# Patient Record
Sex: FEMALE | Race: WHITE | NOT HISPANIC OR LATINO | URBAN - METROPOLITAN AREA
[De-identification: names, ages, dates, MRNs, and addresses within clinical notes are randomized per-mention and may not be internally consistent; named-entity substitution may affect disease eponyms.]

---

## 2017-09-26 ENCOUNTER — OUTPATIENT (OUTPATIENT)
Dept: OUTPATIENT SERVICES | Facility: HOSPITAL | Age: 72
LOS: 1 days | Discharge: HOME | End: 2017-09-26

## 2017-09-26 DIAGNOSIS — H25.12 AGE-RELATED NUCLEAR CATARACT, LEFT EYE: ICD-10-CM

## 2017-10-04 DIAGNOSIS — H26.8 OTHER SPECIFIED CATARACT: ICD-10-CM

## 2019-06-11 ENCOUNTER — OFFICE VISIT (OUTPATIENT)
Dept: OBGYN CLINIC | Facility: CLINIC | Age: 74
End: 2019-06-11
Payer: COMMERCIAL

## 2019-06-11 ENCOUNTER — APPOINTMENT (OUTPATIENT)
Dept: RADIOLOGY | Facility: CLINIC | Age: 74
End: 2019-06-11
Payer: COMMERCIAL

## 2019-06-11 VITALS
HEIGHT: 66 IN | BODY MASS INDEX: 25.07 KG/M2 | DIASTOLIC BLOOD PRESSURE: 82 MMHG | WEIGHT: 156 LBS | SYSTOLIC BLOOD PRESSURE: 119 MMHG | HEART RATE: 62 BPM

## 2019-06-11 DIAGNOSIS — Z98.890 S/P ORIF (OPEN REDUCTION INTERNAL FIXATION) FRACTURE: ICD-10-CM

## 2019-06-11 DIAGNOSIS — M24.811 INTERNAL DERANGEMENT OF SHOULDER, RIGHT: ICD-10-CM

## 2019-06-11 DIAGNOSIS — M79.601 PAIN OF RIGHT UPPER EXTREMITY: ICD-10-CM

## 2019-06-11 DIAGNOSIS — M79.601 PAIN OF RIGHT UPPER EXTREMITY: Primary | ICD-10-CM

## 2019-06-11 DIAGNOSIS — M75.81 ROTATOR CUFF TENDONITIS, RIGHT: ICD-10-CM

## 2019-06-11 DIAGNOSIS — Z87.81 S/P ORIF (OPEN REDUCTION INTERNAL FIXATION) FRACTURE: ICD-10-CM

## 2019-06-11 PROCEDURE — 73030 X-RAY EXAM OF SHOULDER: CPT

## 2019-06-11 PROCEDURE — 99203 OFFICE O/P NEW LOW 30 MIN: CPT | Performed by: ORTHOPAEDIC SURGERY

## 2019-06-11 RX ORDER — DIPHENOXYLATE HYDROCHLORIDE AND ATROPINE SULFATE 2.5; .025 MG/1; MG/1
1 TABLET ORAL DAILY
COMMUNITY

## 2019-06-11 RX ORDER — MIRTAZAPINE 15 MG/1
TABLET, FILM COATED ORAL
COMMUNITY
Start: 2019-05-23 | End: 2022-07-07 | Stop reason: SDUPTHER

## 2019-06-11 RX ORDER — LEVOTHYROXINE SODIUM 0.07 MG/1
TABLET ORAL
COMMUNITY
Start: 2019-06-06 | End: 2022-07-07 | Stop reason: SDUPTHER

## 2019-06-11 RX ORDER — MULTIVIT WITH MINERALS/LUTEIN
1500 TABLET ORAL 2 TIMES DAILY
COMMUNITY

## 2019-06-11 RX ORDER — MAGNESIUM 30 MG
30 TABLET ORAL 2 TIMES DAILY
COMMUNITY
End: 2019-08-21

## 2019-06-11 RX ORDER — UREA 10 %
1 LOTION (ML) TOPICAL DAILY
COMMUNITY
End: 2019-08-21

## 2019-06-12 ENCOUNTER — APPOINTMENT (OUTPATIENT)
Dept: RADIOLOGY | Facility: CLINIC | Age: 74
End: 2019-06-12
Payer: COMMERCIAL

## 2019-06-12 ENCOUNTER — OFFICE VISIT (OUTPATIENT)
Dept: OBGYN CLINIC | Facility: CLINIC | Age: 74
End: 2019-06-12
Payer: COMMERCIAL

## 2019-06-12 VITALS
SYSTOLIC BLOOD PRESSURE: 119 MMHG | DIASTOLIC BLOOD PRESSURE: 82 MMHG | HEIGHT: 66 IN | WEIGHT: 156 LBS | BODY MASS INDEX: 25.07 KG/M2 | HEART RATE: 55 BPM

## 2019-06-12 DIAGNOSIS — M25.562 LEFT KNEE PAIN, UNSPECIFIED CHRONICITY: ICD-10-CM

## 2019-06-12 DIAGNOSIS — M17.12 PRIMARY OSTEOARTHRITIS OF LEFT KNEE: Primary | ICD-10-CM

## 2019-06-12 DIAGNOSIS — M25.511 RIGHT SHOULDER PAIN, UNSPECIFIED CHRONICITY: ICD-10-CM

## 2019-06-12 DIAGNOSIS — M54.12 CERVICAL RADICULITIS: ICD-10-CM

## 2019-06-12 PROCEDURE — 73562 X-RAY EXAM OF KNEE 3: CPT

## 2019-06-12 PROCEDURE — 99213 OFFICE O/P EST LOW 20 MIN: CPT | Performed by: ORTHOPAEDIC SURGERY

## 2019-07-03 ENCOUNTER — EVALUATION (OUTPATIENT)
Dept: PHYSICAL THERAPY | Facility: CLINIC | Age: 74
End: 2019-07-03
Payer: COMMERCIAL

## 2019-07-03 DIAGNOSIS — G89.29 CHRONIC RIGHT SHOULDER PAIN: Primary | ICD-10-CM

## 2019-07-03 DIAGNOSIS — M25.562 CHRONIC PAIN OF LEFT KNEE: ICD-10-CM

## 2019-07-03 DIAGNOSIS — M25.511 CHRONIC RIGHT SHOULDER PAIN: Primary | ICD-10-CM

## 2019-07-03 DIAGNOSIS — G89.29 CHRONIC PAIN OF LEFT KNEE: ICD-10-CM

## 2019-07-03 PROCEDURE — 97162 PT EVAL MOD COMPLEX 30 MIN: CPT | Performed by: PHYSICAL THERAPIST

## 2019-07-03 PROCEDURE — 97110 THERAPEUTIC EXERCISES: CPT | Performed by: PHYSICAL THERAPIST

## 2019-07-03 NOTE — PROGRESS NOTES
PT Evaluation     Today's date: 7/3/2019  Patient name: Yissel Viera  : 1945  MRN: [id number]  Referring provider: Devin Leija MD  Dx:   Encounter Diagnosis     ICD-10-CM    1  Chronic right shoulder pain M25 511     G89 29    2  Chronic pain of left knee M25 562     G89 29                   Assessment  Assessment details: Yissel Viera is a 69year old female presenting to physical therapy with chief complaints of left knee pain and R shoulder pain  Patient demonstrates decreased strength and stability throughout the rotator cuff and long with hypomobility and decreased motor control of the left lower extremity  Extensive education regarding diganosis and expectations were provided to the patient with all questions answered to the satisfaction of the patient  Patient was provided a home exercise program and demonstrated an understanding of exercises  Patient was advised to stop performing home exercise program if symptoms increase or new complaints developed  Verbal understanding demonstrated regarding home exercise program instructions  Patient would benefit from skilled physical therapy services for prescribed exercises, manual interventions, neuromuscular re-education, education, and modalities as deemed appropriate to assist patient in achieving their maximum level of function  Impairments: abnormal gait, abnormal muscle tone, abnormal or restricted ROM, impaired physical strength, lacks appropriate home exercise program and pain with function  Functional limitations: reaching, carrying, squatting, kneeling, getting up from the floor, swimmingUnderstanding of Dx/Px/POC: fair   Prognosis: good    Goals  STG  1  In 4-6 weeks, patient will demonstrate a decrease in pain to 3/10 during functional activities  2  In 4-6 weeks, patient will demonstrate an increase in range of motion by 10 degrees  LTG   1  In 8 weeks, patient will be independent with their home exercise program  2    In 8 weeks, patient will demonstrate improvements in strength as evidenced by being able to lift 5 pounds overhead without pain  3  In 8 weeks, patient will demonstrate improvements in activity tolerance as evidenced by being able to resume swimming activities without limitation  4  In 8 weeks, patient will be able to complete recreational walking without functional limitation secondary to L knee pain      Plan  Patient would benefit from: skilled physical therapy  Planned therapy interventions: balance, flexibility, functional ROM exercises, graded exercise, home exercise program, therapeutic exercise, strengthening, patient education, neuromuscular re-education and manual therapy  Frequency: 2x week  Duration in weeks: 4        Subjective Evaluation    History of Present Illness  Mechanism of injury: R shoulder - Patient reports that she had a humeral ORIF in 2012 on the R side  After her surgery, she reports that she did have pain down into the R wrist and elbow but this did resolve  She states that about 3-4 months ago she started to experience pain again in the shoulder  She states about 2 years ago though she started to feel some discomfort in her elbow and wrist that was similar to what she felt after her original surgery but this did dissipate  Regarding her current complaints she feels that her pain is muscular in nature and states that she did not want to move her arm at all because of pain  She states around the same time she felt pain she was doing some outside work but is unsure if this is related  She states that she has stopped using her arm as much as possible at this time  She does note some improvements in her pain symptoms since resting the upper extremity  She denies having current complaints distal to the elbow  Denies numbness, tingling, burning in the upper extremity  She is RHD  She is able to sleep at night        L knee - Patient reports that she has a long standing history of knee pain, she feels that this is 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  She reports that her left knee locks if she has been sitting for a long period of time and tries to move her knee  She denies having her knee give out on her while she is ambulating  She reports that if she presses on her knee joint while moving she does decrease her pain  She does report having crepitus in the left knee  Pain  Current pain ratin  At best pain ratin  Pain scale at highest: R shoulder 4/10, L knee 4/10  Patient Goals  Patient goals for therapy: increased motion, increased strength, independence with ADLs/IADLs and decreased pain          Objective     Palpation     Additional Palpation Details  Denies  Denies tenderness to palpation     Active Range of Motion     Right Shoulder   Flexion: 140 degrees   Abduction: 110 degrees with pain  External rotation BTH: C6 with pain  Internal rotation BTB: L3   Left Knee   Normal active range of motion    Right Knee   Normal active range of motion    Passive Range of Motion     Right Shoulder   Flexion: 150 degrees   Abduction: 121 degrees with pain  External rotation 90°: 30 degrees     Mobility   Patellar Mobility:   Left Knee   Hypomobile: left medial  Tibiofemoral Mobility:   Left knee Hypomobile in the posterior tibiofemoral tendon(s)  Strength/Myotome Testing     Left Shoulder   Normal muscle strength    Right Shoulder     Planes of Motion   Flexion: 4   Abduction: 4-   External rotation at 0°: 4-   Internal rotation at 0°: 4-     Left Knee   Flexion: 4  Extension: 4    Tests     Right Shoulder   Positive belly press, Hawkin's and painful arc  Negative external rotation lag sign, ULTT1 and bicep load test positive  Left Knee   Positive patella-femoral grind  Negative anterior Lachman, lateral Pancho, medial Pancho, valgus stress test at 0 degrees and varus stress test at 0 degrees                Precautions: R shoulder ORIF       Manual  7/3            R shoulder prom Exercise Diary  7/3            SL ER 20x            SL ABD 20x            SLR 20x            Tb ir/er             Prone row             Prone ext             SLR ABD             Heel slides             HSS             Quad stretch                                                                                                                                                   Modalities

## 2019-07-10 ENCOUNTER — OFFICE VISIT (OUTPATIENT)
Dept: PHYSICAL THERAPY | Facility: CLINIC | Age: 74
End: 2019-07-10
Payer: COMMERCIAL

## 2019-07-10 DIAGNOSIS — G89.29 CHRONIC RIGHT SHOULDER PAIN: Primary | ICD-10-CM

## 2019-07-10 DIAGNOSIS — M25.511 CHRONIC RIGHT SHOULDER PAIN: Primary | ICD-10-CM

## 2019-07-10 DIAGNOSIS — M25.562 CHRONIC PAIN OF LEFT KNEE: ICD-10-CM

## 2019-07-10 DIAGNOSIS — G89.29 CHRONIC PAIN OF LEFT KNEE: ICD-10-CM

## 2019-07-10 PROCEDURE — 97112 NEUROMUSCULAR REEDUCATION: CPT | Performed by: PHYSICAL THERAPIST

## 2019-07-10 PROCEDURE — 97140 MANUAL THERAPY 1/> REGIONS: CPT | Performed by: PHYSICAL THERAPIST

## 2019-07-10 PROCEDURE — 97110 THERAPEUTIC EXERCISES: CPT | Performed by: PHYSICAL THERAPIST

## 2019-07-10 NOTE — PROGRESS NOTES
Daily Note     Today's date: 7/10/2019  Patient name: Alana Worrell  : 1945  MRN: [id number]  Referring provider: Neoma Sicard, MD  Dx:   Encounter Diagnosis     ICD-10-CM    1  Chronic right shoulder pain M25 511     G89 29    2  Chronic pain of left knee M25 562     G89 29                   Subjective: Patient reports today that she has noticed that she has the most amount of difficulty with external rotation based motions  Objective: See treatment diary below      Assessment: High levels of patient education provided today regarding referred pain  Difficulty remains with completing external rotation based motions in the shoulder secondary to pain  As far as the knee is concerned, weakness in the hip abductors are present which does create a valgus moment in the knee      Plan: Continue per plan of care        Precautions: R shoulder ORIF       Manual  7/3 7/10           R shoulder prom  10 mins                                                                   Exercise Diary  7/3 7/10           SL ER 20x 20x           SL ABD 20x 20x           SLR 20x 20x           Tb ir/er  nv           Prone abd  20x           Prone ext  20x           SLR ABD  20x           Heel slides  20x           HSS  10"x10           Quad stretch  10"x10           pulleys  10"x20           Finger ladder  10"x5           clamshells  20x           Standing hip abduction  gtb 20x                                                                                             Modalities

## 2019-07-17 ENCOUNTER — APPOINTMENT (OUTPATIENT)
Dept: PHYSICAL THERAPY | Facility: CLINIC | Age: 74
End: 2019-07-17
Payer: COMMERCIAL

## 2019-07-18 ENCOUNTER — OFFICE VISIT (OUTPATIENT)
Dept: PHYSICAL THERAPY | Facility: CLINIC | Age: 74
End: 2019-07-18
Payer: COMMERCIAL

## 2019-07-18 DIAGNOSIS — M25.562 CHRONIC PAIN OF LEFT KNEE: ICD-10-CM

## 2019-07-18 DIAGNOSIS — M25.511 CHRONIC RIGHT SHOULDER PAIN: Primary | ICD-10-CM

## 2019-07-18 DIAGNOSIS — G89.29 CHRONIC RIGHT SHOULDER PAIN: Primary | ICD-10-CM

## 2019-07-18 DIAGNOSIS — G89.29 CHRONIC PAIN OF LEFT KNEE: ICD-10-CM

## 2019-07-18 PROCEDURE — 97140 MANUAL THERAPY 1/> REGIONS: CPT

## 2019-07-18 PROCEDURE — 97110 THERAPEUTIC EXERCISES: CPT

## 2019-07-18 NOTE — PROGRESS NOTES
Daily Note     Today's date: 2019  Patient name: Charo Moss  : 1945  MRN: [id number]  Referring provider: Erna Sánchez MD  Dx:   Encounter Diagnosis     ICD-10-CM    1  Chronic right shoulder pain M25 511     G89 29    2  Chronic pain of left knee M25 562     G89 29                   Subjective: Patient noted pain in R shoulder and L knee  Objective: See treatment diary below      Assessment: Tolerated treatment fair  Patient was able to perform exercises with correct form and tolerance  PROM to tolerance  Patient would benefit from continued PT      Plan: Continue per plan of care        Precautions: R shoulder ORIF       Manual  7/3 7/10 7/18          R shoulder prom  10 mins 10 mins                                                                  Exercise Diary  7/3 7/10 7/18          SL ER 20x 20x 20x          SL ABD 20x 20x x20          SLR 20x 20x x20          Tb ir/er  nv           Prone abd  20x 20 x           Prone ext  20x 20 x           SLR ABD  20x x20          Heel slides  20x 20x          HSS  10"x10 10"x10          Quad stretch  10"x10 10"x10          pulleys  10"x20 10"x20          Finger ladder  10"x5 10"x5          clamshells  20x 20x          Standing hip abduction  gtb 20x gtb 20x                                                                                            Modalities

## 2019-07-19 ENCOUNTER — APPOINTMENT (OUTPATIENT)
Dept: PHYSICAL THERAPY | Facility: CLINIC | Age: 74
End: 2019-07-19
Payer: COMMERCIAL

## 2019-07-22 ENCOUNTER — OFFICE VISIT (OUTPATIENT)
Dept: PHYSICAL THERAPY | Facility: CLINIC | Age: 74
End: 2019-07-22
Payer: COMMERCIAL

## 2019-07-22 DIAGNOSIS — G89.29 CHRONIC RIGHT SHOULDER PAIN: Primary | ICD-10-CM

## 2019-07-22 DIAGNOSIS — G89.29 CHRONIC PAIN OF LEFT KNEE: ICD-10-CM

## 2019-07-22 DIAGNOSIS — M25.562 CHRONIC PAIN OF LEFT KNEE: ICD-10-CM

## 2019-07-22 DIAGNOSIS — M25.511 CHRONIC RIGHT SHOULDER PAIN: Primary | ICD-10-CM

## 2019-07-22 PROCEDURE — 97140 MANUAL THERAPY 1/> REGIONS: CPT | Performed by: PHYSICAL THERAPIST

## 2019-07-22 PROCEDURE — 97110 THERAPEUTIC EXERCISES: CPT | Performed by: PHYSICAL THERAPIST

## 2019-07-22 NOTE — PROGRESS NOTES
Daily Note     Today's date: 2019  Patient name: Sharin Aschoff  : 1945  MRN: [id number]  Referring provider: Marely Barrow MD  Dx:   Encounter Diagnosis     ICD-10-CM    1  Chronic right shoulder pain M25 511     G89 29    2  Chronic pain of left knee M25 562     G89 29                   Subjective: "The knee is a mess, it locks all the time painfully" "The shoulder is slightly better"  Patient throughout treatment remained fixated on "pain episodes" in the knee with movement following sleeping      Objective: See treatment diary below      Assessment: Patient to this point has not been completing her home exercise program   Explained in detail the importance of completing her home exercise program   Patient overhead motion and abduction based motions continue to improve during PROM but she does have difficulty with ER based motions  End feel is empty with pain being the biggest limiting factor      Plan: Continue per plan of care        Precautions: R shoulder ORIF       Manual  7/3 7/10 7/18 7/22         R shoulder prom  10 mins 10 mins                                                                  Exercise Diary  7/3 7/10 7/18 7/22         SL ER 20x 20x 20x 2# 20x         SL ABD 20x 20x x20 2# 20x         SLR 20x 20x x20 3# 20x         Tb ir/er  nv           Prone abd  20x 20 x  2# 20x         Prone ext  20x 20 x  2# 20x         SLR ABD  20x x20 3# 20x         Heel slides  20x 20x 10"x10         HSS  10"x10 10"x10 DC         Quad stretch  10"x10 10"x10 dc         pulleys  10"x20 10"x20 10"x20         Finger ladder  10"x5 10"x5          clamshells  20x 20x 20X         Standing hip abduction  gtb 20x gtb 20x          Bike    5 mins                                                                              Modalities

## 2019-07-25 ENCOUNTER — OFFICE VISIT (OUTPATIENT)
Dept: PHYSICAL THERAPY | Facility: CLINIC | Age: 74
End: 2019-07-25
Payer: COMMERCIAL

## 2019-07-25 DIAGNOSIS — G89.29 CHRONIC RIGHT SHOULDER PAIN: Primary | ICD-10-CM

## 2019-07-25 DIAGNOSIS — M25.511 CHRONIC RIGHT SHOULDER PAIN: Primary | ICD-10-CM

## 2019-07-25 DIAGNOSIS — G89.29 CHRONIC PAIN OF LEFT KNEE: ICD-10-CM

## 2019-07-25 DIAGNOSIS — M25.562 CHRONIC PAIN OF LEFT KNEE: ICD-10-CM

## 2019-07-25 PROCEDURE — 97110 THERAPEUTIC EXERCISES: CPT | Performed by: PHYSICAL THERAPIST

## 2019-07-25 PROCEDURE — 97140 MANUAL THERAPY 1/> REGIONS: CPT | Performed by: PHYSICAL THERAPIST

## 2019-07-25 NOTE — PROGRESS NOTES
Daily Note     Today's date: 2019  Patient name: Varun Griffith  : 1945  MRN: [id number]  Referring provider: Kirsty Woodson MD  Dx:   Encounter Diagnosis     ICD-10-CM    1  Chronic right shoulder pain M25 511     G89 29    2  Chronic pain of left knee M25 562     G89 29                   Subjective: Patient offers no new complaints today, reports that over the past two night she has not been having her knee "crack" as much as the previous nights      Objective: See treatment diary below      Assessment: Shoulder PROM was full and pain free in all planes today but patient continues to demonstrate weakness throughout the shoulder  As far as the lower extremity is concerned, decreased motor control remains  Much of the treatment was spent providing patient education regarding current impairments  Plan: Continue per plan of care        Precautions: R shoulder ORIF       Manual  7/3 7/10 7/18 7/22 7/25        R shoulder prom  10 mins 10 mins  10 mins                                                                Exercise Diary  7/3 7/10 7/18 7/22 7/25        SL ER 20x 20x 20x 2# 20x 2# 20x        SL ABD 20x 20x x20 2# 20x 0 20x        SLR 20x 20x x20 3# 20x 3# 20x        Tb ir/er  nv   gtb ir 20x rtb 20x er        Prone abd  20x 20 x  2# 20x 20x 2#         Prone ext  20x 20 x  2# 20x 2# 20x        SLR ABD  20x x20 3# 20x 3# 20x        Heel slides  20x 20x 10"x10         HSS  10"x10 10"x10 DC         Quad stretch  10"x10 10"x10 dc 10"x10        pulleys  10"x20 10"x20 10"x20 10"x3 mins        Finger ladder  10"x5 10"x5  abd 5x        clamshells  20x 20x 20X         Standing hip abduction  gtb 20x gtb 20x          Bike    5 mins 7 mins                                                                             Modalities

## 2019-07-29 ENCOUNTER — OFFICE VISIT (OUTPATIENT)
Dept: PHYSICAL THERAPY | Facility: CLINIC | Age: 74
End: 2019-07-29
Payer: COMMERCIAL

## 2019-07-29 DIAGNOSIS — M25.562 CHRONIC PAIN OF LEFT KNEE: ICD-10-CM

## 2019-07-29 DIAGNOSIS — G89.29 CHRONIC RIGHT SHOULDER PAIN: Primary | ICD-10-CM

## 2019-07-29 DIAGNOSIS — M25.511 CHRONIC RIGHT SHOULDER PAIN: Primary | ICD-10-CM

## 2019-07-29 DIAGNOSIS — G89.29 CHRONIC PAIN OF LEFT KNEE: ICD-10-CM

## 2019-07-29 PROCEDURE — 97110 THERAPEUTIC EXERCISES: CPT | Performed by: PHYSICAL THERAPIST

## 2019-07-29 PROCEDURE — 97140 MANUAL THERAPY 1/> REGIONS: CPT | Performed by: PHYSICAL THERAPIST

## 2019-07-29 NOTE — PROGRESS NOTES
Daily Note     Today's date: 2019  Patient name: Mary Ann Lopez  : 1945  MRN: [id number]  Referring provider: Steven Coronado MD  Dx:   Encounter Diagnosis     ICD-10-CM    1  Chronic right shoulder pain M25 511     G89 29    2  Chronic pain of left knee M25 562     G89 29                   Subjective: Patient reports that she has not had the knee pain while sleeping like she had last week but she does still having knee and shoulder pain that is rather consistent without a particular pain pattern  Objective: See treatment diary below      Assessment: Continues to have pain and discomfort with abduction and ER based motions of the upper extremity  PROM of the UE is full but when limitations occur, end feel is empty in nature with pain as the biggest limiting factor  Weakness remains in the upper and lower extremities      Plan: Continue per plan of care        Precautions: R shoulder ORIF       Manual  7/3 7/10 7/18 7/22 7/25 7/29       R shoulder prom  10 mins 10 mins  10 mins 10 mins                                                               Exercise Diary  7/3 7/10 7/18 7/22 7/25 7/29       SL ER 20x 20x 20x 2# 20x 2# 20x 2# 20x       SL ABD 20x 20x x20 2# 20x 0 20x 2# 20x       SLR 20x 20x x20 3# 20x 3# 20x 3# 20x       Tb ir/er  nv   gtb ir 20x rtb 20x er gtb ir 20x rtb 20x er       Prone abd  20x 20 x  2# 20x 20x 2#  2# 20       Prone ext  20x 20 x  2# 20x 2# 20x 2# 20x       SLR ABD  20x x20 3# 20x 3# 20x 3# 20x       Heel slides  20x 20x 10"x10         HSS  10"x10 10"x10 DC         Quad stretch  10"x10 10"x10 dc 10"x10 10"x10       pulleys  10"x20 10"x20 10"x20 10"x3 mins 10"x10       Finger ladder  10"x5 10"x5  abd 5x abd 5x       clamshells  20x 20x 20X         Standing hip abduction  gtb 20x gtb 20x   Foam 20x       Bike    5 mins 7 mins 7 mins                                                                            Modalities

## 2019-07-30 ENCOUNTER — TELEPHONE (OUTPATIENT)
Dept: OBGYN CLINIC | Facility: HOSPITAL | Age: 74
End: 2019-07-30

## 2019-07-30 NOTE — TELEPHONE ENCOUNTER
Patient is calling stating she does not want to make two appointments for her knee and her shoulder  I wanted to see if this was okay  She did originally have two appointments when she was seen initially but now refuses to make two appointments for follow ups

## 2019-07-30 NOTE — TELEPHONE ENCOUNTER
Call to this patient to make her aware of [facility] note, and to please call back to [phone] to schedule the appointment to be seen for the knee & shoulder as per [facility] note

## 2019-08-01 ENCOUNTER — APPOINTMENT (OUTPATIENT)
Dept: PHYSICAL THERAPY | Facility: CLINIC | Age: 74
End: 2019-08-01
Payer: COMMERCIAL

## 2019-08-05 ENCOUNTER — OFFICE VISIT (OUTPATIENT)
Dept: PHYSICAL THERAPY | Facility: CLINIC | Age: 74
End: 2019-08-05
Payer: COMMERCIAL

## 2019-08-05 DIAGNOSIS — G89.29 CHRONIC PAIN OF LEFT KNEE: ICD-10-CM

## 2019-08-05 DIAGNOSIS — G89.29 CHRONIC RIGHT SHOULDER PAIN: Primary | ICD-10-CM

## 2019-08-05 DIAGNOSIS — M25.511 CHRONIC RIGHT SHOULDER PAIN: Primary | ICD-10-CM

## 2019-08-05 DIAGNOSIS — M25.562 CHRONIC PAIN OF LEFT KNEE: ICD-10-CM

## 2019-08-05 PROCEDURE — 97140 MANUAL THERAPY 1/> REGIONS: CPT | Performed by: PHYSICAL THERAPIST

## 2019-08-05 PROCEDURE — 97112 NEUROMUSCULAR REEDUCATION: CPT | Performed by: PHYSICAL THERAPIST

## 2019-08-05 PROCEDURE — 97110 THERAPEUTIC EXERCISES: CPT | Performed by: PHYSICAL THERAPIST

## 2019-08-05 NOTE — PROGRESS NOTES
Daily Note     Today's date: 2019  Patient name: Yissel Viera  : 1945  MRN: [id number]  Referring provider: Devin Leija MD  Dx:   Encounter Diagnosis     ICD-10-CM    1  Chronic right shoulder pain M25 511     G89 29    2  Chronic pain of left knee M25 562     G89 29                   Subjective: Patient reports that she has been doing a lot of work taking over a house that is 13 years old so that is becoming overwhelming for her  She states that her R shoulder was sore for about 48 hours after her last treatmen t      Objective: See treatment diary below      Assessment: Patient was able to achieve 90/90 position for PROM into ER with no complaints of discomfort  Decreased resistance today for UE strengthening secondary to soreness after last visit  Patient had no complaints of pain or discomfort after treatment       Plan: Continue per plan of care        Precautions: R shoulder ORIF       Manual  7/3 7/10 7/18 7/22 7/25 7/29 8/5      R shoulder prom  10 mins 10 mins  10 mins 10 mins 10 mins                                                              Exercise Diary  7/3 7/10 7/18 7/22 7/25 7/29 8      SL ER 20x 20x 20x 2# 20x 2# 20x 2# 20x 30x      SL ABD 20x 20x x20 2# 20x 0 20x 2# 20x 20x      SLR 20x 20x x20 3# 20x 3# 20x 3# 20x 2# 30x      Tb ir/er  nv   gtb ir 20x rtb 20x er gtb ir 20x rtb 20x er rtb ea 20x      Prone abd  20x 20 x  2# 20x 20x 2#  2# 20 20x      Prone ext  20x 20 x  2# 20x 2# 20x 2# 20x 20x      SLR ABD  20x x20 3# 20x 3# 20x 3# 20x 2# 30x      Heel slides  20x 20x 10"x10         HSS  10"x10 10"x10 DC         Quad stretch  10"x10 10"x10 dc 10"x10 10"x10       pulleys  10"x20 10"x20 10"x20 10"x3 mins 10"x10 10"x10      Finger ladder  10"x5 10"x5  abd 5x abd 5x       clamshells  20x 20x 20X         Standing hip abduction  gtb 20x gtb 20x   Foam 20x Foam 20x      Bike    5 mins 7 mins 7 mins 7 mins      Wall slides with flex bar       Green 10x      rockerboard       30x Modalities

## 2019-08-12 ENCOUNTER — EVALUATION (OUTPATIENT)
Dept: PHYSICAL THERAPY | Facility: CLINIC | Age: 74
End: 2019-08-12
Payer: COMMERCIAL

## 2019-08-12 DIAGNOSIS — G89.29 CHRONIC PAIN OF LEFT KNEE: ICD-10-CM

## 2019-08-12 DIAGNOSIS — G89.29 CHRONIC RIGHT SHOULDER PAIN: Primary | ICD-10-CM

## 2019-08-12 DIAGNOSIS — M25.562 CHRONIC PAIN OF LEFT KNEE: ICD-10-CM

## 2019-08-12 DIAGNOSIS — M25.511 CHRONIC RIGHT SHOULDER PAIN: Primary | ICD-10-CM

## 2019-08-12 PROCEDURE — 97112 NEUROMUSCULAR REEDUCATION: CPT | Performed by: PHYSICAL THERAPIST

## 2019-08-12 PROCEDURE — 97140 MANUAL THERAPY 1/> REGIONS: CPT | Performed by: PHYSICAL THERAPIST

## 2019-08-12 NOTE — PROGRESS NOTES
PT Re-Evaluation     Today's date: 2019  Patient name: Varun Griffith  : 1945  MRN: [id number]  Referring provider: Kirsty Woodson MD  Dx:   Encounter Diagnosis     ICD-10-CM    1  Chronic right shoulder pain M25 511     G89 29    2  Chronic pain of left knee M25 562     G89 29                   Assessment  Assessment details: Varun Griffith is a 69year old female presenting to physical therapy with chief complaints of left knee pain and R shoulder pain  She does demonstrate full range of motion and improvements in strength in both the upper and lower extremity but continues to have complaints of pain  She does have bcontinued weakness in the rotator cuff but there is no capsular hypomobility associated with it  She is currently in the process of updating an old house and is doing a lot of manual work that does aggravate symptoms for her  She does note that she tries to complete her HEP but often times she is unable to secondary to her busy schedule  She does demonstrate good motor control of both the upper and lower extmirety  Functionally speaking, she has the most amount of difficulty lifting, carrying, hammering, going up and down stairs, standing, and kneeling  Patient would benefit from skilled physical therapy services for prescribed exercises, manual interventions, neuromuscular re-education, education, and modalities as deemed appropriate to assist patient in achieving their maximum level of function  Impairments: abnormal gait, abnormal muscle tone, abnormal or restricted ROM, impaired physical strength, lacks appropriate home exercise program and pain with function  Functional limitations: reaching, carrying, squatting, kneeling, getting up from the floor, swimmingUnderstanding of Dx/Px/POC: fair   Prognosis: good    Goals  STG  1  In 4-6 weeks, patient will demonstrate a decrease in pain to 3/10 during functional activities - not met  2    In 4-6 weeks, patient will demonstrate an increase in range of motion by 10 degrees - met  LTG   1  In 8 weeks, patient will be independent with their home exercise program - met  2  In 8 weeks, patient will demonstrate improvements in strength as evidenced by being able to lift 5 pounds overhead without pain - not met  3  In 8 weeks, patient will demonstrate improvements in activity tolerance as evidenced by being able to resume swimming activities without limitation - not met  4  In 8 weeks, patient will be able to complete recreational walking without functional limitation secondary to L knee pain - not met      Plan  Patient would benefit from: skilled physical therapy  Planned therapy interventions: balance, flexibility, functional ROM exercises, graded exercise, home exercise program, therapeutic exercise, strengthening, patient education, neuromuscular re-education and manual therapy  Frequency: 2x week  Duration in weeks: 4        Subjective Evaluation    History of Present Illness  Mechanism of injury: Patient continues to report ongoing pain in the shoulder and knee  She states that she is going to going away shortly for the next couple of weeks  Has an appointment with orthopedics next week  Patient did have one episode where she had sharp clicking in her knee while she was sleeping but that has not returned since that specific night  She continues to report ongoing pain in the shoulder with limitations in strength  She does have full ROM in the shoulder but it is painful  As far as her knee is concerned, it is similar in that she has full active and passive ROM but her knee is painful during weight bearing and non-weight bearing movements  Pain  Current pain ratin  At best pain ratin  Pain scale at highest: R shoulder 4/10, L knee 4/10      Patient Goals  Patient goals for therapy: increased motion, increased strength, independence with ADLs/IADLs and decreased pain          Objective     Palpation     Additional Palpation Details  Denies tenderness to palpation   Denies tenderness to palpation     Active Range of Motion     Right Shoulder   Flexion: WFL  Abduction: WFL and with pain  External rotation BTH: T1 with pain  Internal rotation BTB: L1 with pain  Left Knee   Normal active range of motion    Right Knee   Normal active range of motion    Passive Range of Motion     Right Shoulder   Normal passive range of motion  Abduction: with pain  External rotation 90°: with pain    Mobility   Patellar Mobility:   Left Knee   Hypomobile: left medial  Tibiofemoral Mobility:   Left knee Hypomobile in the posterior tibiofemoral tendon(s)  Strength/Myotome Testing     Left Shoulder   Normal muscle strength    Right Shoulder     Planes of Motion   Flexion: 4+   Abduction: 4   External rotation at 0°: 4   Internal rotation at 0°: 4     Left Knee   Flexion: 4+  Extension: 4+    Tests     Right Shoulder   Positive belly press, Hawkin's and painful arc  Negative external rotation lag sign, ULTT1 and bicep load test positive  Left Knee   Positive patella-femoral grind  Negative anterior Lachman, lateral Pancho, medial Pancho, valgus stress test at 0 degrees and varus stress test at 0 degrees                Precautions: R shoulder ORIF       Manual  7/3 7/10 7/18 7/22 7/25 7/29 8/5 8/12     R shoulder prom  10 mins 10 mins  10 mins 10 mins 10 mins 10 mins                                                             Exercise Diary  7/3 7/10 7/18 7/22 7/25 7/29 8/5 8/12     SL ER 20x 20x 20x 2# 20x 2# 20x 2# 20x 30x 3# 20x     SL ABD 20x 20x x20 2# 20x 0 20x 2# 20x 20x 3# 20x     SLR 20x 20x x20 3# 20x 3# 20x 3# 20x 2# 30x 3# 20x     Tb ir/er  nv   gtb ir 20x rtb 20x er gtb ir 20x rtb 20x er rtb ea 20x rtb ea 20x     Prone abd  20x 20 x  2# 20x 20x 2#  2# 20 20x 3# 20x     Prone ext  20x 20 x  2# 20x 2# 20x 2# 20x 20x 3# 20x     SLR ABD  20x x20 3# 20x 3# 20x 3# 20x 2# 30x      Heel slides  20x 20x 10"x10         HSS  10"x10 10"x10 DC Quad stretch  10"x10 10"x10 dc 10"x10 10"x10       pulleys  10"x20 10"x20 10"x20 10"x3 mins 10"x10 10"x10 10"x10     Finger ladder  10"x5 10"x5  abd 5x abd 5x       clamshells  20x 20x 20X         Standing hip abduction  gtb 20x gtb 20x   Foam 20x Foam 20x 3# 20x     Bike    5 mins 7 mins 7 mins 7 mins 8 mins     Wall slides with flex bar       Green 10x Green 15x     rockerboard       30x 30x                                                Modalities

## 2019-08-15 ENCOUNTER — OFFICE VISIT (OUTPATIENT)
Dept: PHYSICAL THERAPY | Facility: CLINIC | Age: 74
End: 2019-08-15
Payer: COMMERCIAL

## 2019-08-15 DIAGNOSIS — G89.29 CHRONIC RIGHT SHOULDER PAIN: Primary | ICD-10-CM

## 2019-08-15 DIAGNOSIS — M25.562 CHRONIC PAIN OF LEFT KNEE: ICD-10-CM

## 2019-08-15 DIAGNOSIS — G89.29 CHRONIC PAIN OF LEFT KNEE: ICD-10-CM

## 2019-08-15 DIAGNOSIS — M25.511 CHRONIC RIGHT SHOULDER PAIN: Primary | ICD-10-CM

## 2019-08-15 PROCEDURE — 97112 NEUROMUSCULAR REEDUCATION: CPT | Performed by: PHYSICAL THERAPIST

## 2019-08-15 PROCEDURE — 97140 MANUAL THERAPY 1/> REGIONS: CPT | Performed by: PHYSICAL THERAPIST

## 2019-08-15 NOTE — PROGRESS NOTES
Daily Note     Today's date: 8/15/2019  Patient name: Constantin Akbar  : 1945  MRN: [id number]  Referring provider: Fazal Malave MD  Dx:   Encounter Diagnosis     ICD-10-CM    1  Chronic right shoulder pain M25 511     G89 29    2  Chronic pain of left knee M25 562     G89 29                   Subjective: Patient continues to report having a lot going on in her life and difficulties completing HEP  Going to see referring physician next week  Objective: See treatment diary below      Assessment: Challenged today with UE PROM, especially into ER    Patient continues to have diffuse complaints of pain throughout the shoulder with overhead movements      Plan: Hold PT until seen by referring physician     Precautions: R shoulder ORIF       Manual  7/3 7/10 7/18 7/22 7/25 7/29 8/5 8/12 8/15    R shoulder prom  10 mins 10 mins  10 mins 10 mins 10 mins 10 mins 10 mins                                                            Exercise Diary  7/3 7/10 7/18 7/22 7/25 7/29 8/5 8/12 8/15    SL ER 20x 20x 20x 2# 20x 2# 20x 2# 20x 30x 3# 20x 20x    SL ABD 20x 20x x20 2# 20x 0 20x 2# 20x 20x 3# 20x 20x    SLR 20x 20x x20 3# 20x 3# 20x 3# 20x 2# 30x 3# 20x 20x    Tb ir/er  nv   gtb ir 20x rtb 20x er gtb ir 20x rtb 20x er rtb ea 20x rtb ea 20x rtb 20x    Prone abd  20x 20 x  2# 20x 20x 2#  2# 20 20x 3# 20x 20x    Prone ext  20x 20 x  2# 20x 2# 20x 2# 20x 20x 3# 20x 20x    SLR ABD  20x x20 3# 20x 3# 20x 3# 20x 2# 30x      Heel slides  20x 20x 10"x10         HSS  10"x10 10"x10 DC         Quad stretch  10"x10 10"x10 dc 10"x10 10"x10       pulleys  10"x20 10"x20 10"x20 10"x3 mins 10"x10 10"x10 10"x10 10"x10    Finger ladder  10"x5 10"x5  abd 5x abd 5x       clamshells  20x 20x 20X         Standing hip abduction  gtb 20x gtb 20x   Foam 20x Foam 20x 3# 20x 20x    Bike    5 mins 7 mins 7 mins 7 mins 8 mins 8 mins    Wall slides with flex bar       Green 10x Green 15x 10x    rockerboard       30x 30x 30x Modalities

## 2019-08-21 ENCOUNTER — OFFICE VISIT (OUTPATIENT)
Dept: OBGYN CLINIC | Facility: CLINIC | Age: 74
End: 2019-08-21
Payer: COMMERCIAL

## 2019-08-21 VITALS
DIASTOLIC BLOOD PRESSURE: 73 MMHG | SYSTOLIC BLOOD PRESSURE: 114 MMHG | HEIGHT: 66 IN | WEIGHT: 155.2 LBS | BODY MASS INDEX: 24.94 KG/M2 | HEART RATE: 73 BPM

## 2019-08-21 DIAGNOSIS — M54.12 CERVICAL RADICULOPATHY: ICD-10-CM

## 2019-08-21 DIAGNOSIS — M17.12 PRIMARY OSTEOARTHRITIS OF LEFT KNEE: ICD-10-CM

## 2019-08-21 DIAGNOSIS — M75.81 ROTATOR CUFF TENDONITIS, RIGHT: Primary | ICD-10-CM

## 2019-08-21 PROCEDURE — 99213 OFFICE O/P EST LOW 20 MIN: CPT | Performed by: ORTHOPAEDIC SURGERY

## 2019-08-21 NOTE — PROGRESS NOTES
SUBJECTIVE  Pt is a 69year old female who is here for a follow up for left knee tricompartmental OA and right shoulder rotator cuff tendonitis with Hx of right humerus ORIF performed in 2012 for  comminuted fracture of the greater tuberosity  as well as possible underlying nerve root irritation in her neck  Order was placed for therapy for left knee, right shoulder and neck at last visit on 6/12/19  Today patient states that since her last visit she remembers a period of right sided neck pain after shoveling snow last winter  Pt states that she is still having pain on the lateral aspect of the upper arm with motion of the right shoulder and after significant activity  Pt  States that she continues to have grinding in her right knee with associated pain  Pt states that it is less painful than when it started 2 years ago  Pt states that she continues to have catching in her left knee that occurs when she is in a seated position when she changes positions of her left leg and when she is laying down in bed  Pt states that the catching does not occur during ambulation  Pt states that she takes naproxen for pain as needed  Pt has been attending therapy as prescribed  Pt states that the therapy did not help much  Pt states that she is not doing the HEP as advised by therapy because she is too busy         ROS:   General: No fever, no chills, no weight loss, no weight gain  HEENT:  No loss of hearing, no nose bleeds, no sore throat  Eyes:  No eye pain, no red eyes, no visual disturbance  Respiratory:  No cough, no shortness of breath, no wheezing  Cardiovascular:  No chest pain, no palpitations, no edema  GI: No abdominal pain, no nausea, no vomiting  Endocrine: No frequent urination, no excessive thirst  Urinary:  No dysuria, no hematuria, no incontinence  Musculoskeletal: see HPI and PE  Skin:  No rash, no wounds  Neurological:  No dizziness, no headache, no numbness  Psychiatric:  No difficulty concentrating, no depression, no suicide thoughts, no anxiety  Review of all other systems is negative    PMH:  Past Medical History:   Diagnosis Date    Disease of thyroid gland        PSH:  Past Surgical History:   Procedure Laterality Date    SHOULDER SURGERY Right 2012    orif       Medications:  Current Outpatient Medications   Medication Sig Dispense Refill    Ascorbic Acid (VITAMIN C) 1000 MG tablet Take 1,500 mg by mouth 2 (two) times a day      levothyroxine 75 mcg tablet       mirtazapine (REMERON) 15 mg tablet       multivitamin (THERAGRAN) TABS Take 1 tablet by mouth daily      NON FORMULARY        No current facility-administered medications for this visit  Allergies:  No Known Allergies    Family History:  Family History   Problem Relation Age of Onset    Polymyalgia rheumatica Mother     Diabetes Father     Diabetes Sister        Social History:  Social History     Occupational History    Not on file   Tobacco Use    Smoking status: Former Smoker     Types: Cigarettes     Last attempt to quit:      Years since quittin 6    Smokeless tobacco: Never Used   Substance and Sexual Activity    Alcohol use: Yes     Alcohol/week: 1 0 standard drinks     Types: 1 Glasses of wine per week     Frequency: 4 or more times a week     Drinks per session: 1 or 2    Drug use: Not Currently    Sexual activity: Not on file       Physical Exam:  General :  Alert, cooperative, no distress, appears stated age  Blood pressure 114/73, pulse 73, height 5' 5 5" (1 664 m), weight 70 4 kg (155 lb 3 2 oz)  Head:  Normocephalic, without obvious abnormality, atraumatic   Eyes:  Conjunctiva/corneas clear, EOM's intact,   Ears: Both ears normal appearance, no hearing deficits      Nose: Nares normal, septum midline, no drainage    Neck: Supple,  trachea midline, no adenopathy, no tenderness, no mass   Back:   Symmetric, no curvature, ROM normal, no tenderness   Lungs:   Respirations unlabored   Chest Wall:  No tenderness or deformity   Extremities: Extremities normal, atraumatic, no cyanosis or edema      Pulses: 2+ and symmetric   Skin: Skin color, texture, turgor normal, no rashes or lesions      Neurologic: Normal           Left Knee Exam     Range of Motion   The patient has normal left knee ROM  Tests   Pancho:  Medial - negative Lateral - negative  Lachman:  Anterior - negative    Posterior - negative    Other   Erythema: absent  Sensation: normal  Pulse: present  Swelling: none  Effusion: no effusion present    Comments:  Lateral greater than medial joint line tenderness  Positive apply grind test  Valgus deformity       Right Shoulder Exam     Tenderness   Right shoulder tenderness location: lateral cuff     Range of Motion   Active abduction: 170   Passive abduction: 170   Forward flexion: 180     Muscle Strength   Abduction: 4/5   Internal rotation: 5/5   External rotation: 4/5   Supraspinatus: 4/5     Tests   Campos test: positive  Impingement: positive    Other   Erythema: absent  Scars: absent  Sensation: normal  Pulse: present    Comments:  Positive olvera  Negative yergason's             Imaging Studies: The following imaging studies were reviewed in office today  My findings are noted  No new images     Assessment  Encounter Diagnoses   Name Primary?  Rotator cuff tendonitis, right Yes    Primary osteoarthritis of left knee     Cervical radiculopathy          Plan:    CT arthrogram of the right shoulder was ordered to evaluate for rotator cuff tear due to the placement of previous orthopedic hardware an MRI would likely have significant artifacts making the MRI unreadable  Pt was advised to continue therapy as previously ordered for her knee, shoulder and neck  Pt was advised to continue NSAIDs for pain  Pt will follow up in the office to review CT arthrogram     Pt was advised to make an apt with Dr Rebekah Barreto for her hands           Scribe Attestation    I,:   Alley Barr am acting as a scribe while in the presence of the attending physician :        I,:   Grecia Ham MD personally performed the services described in this documentation    as scribed in my presence :

## 2019-09-18 NOTE — PROGRESS NOTES
Discharge  Patient did not return to physical therapy after seeing MD   As a result, patient is DC from PT    Measurements and goals unable to be updated

## 2019-10-08 DIAGNOSIS — Z01.818 ENCOUNTER FOR PREADMISSION TESTING: Primary | ICD-10-CM

## 2019-10-14 ENCOUNTER — HOSPITAL ENCOUNTER (OUTPATIENT)
Dept: RADIOLOGY | Facility: HOSPITAL | Age: 74
Discharge: HOME | End: 2019-10-14
Attending: ORTHOPAEDIC SURGERY

## 2019-10-17 ENCOUNTER — APPOINTMENT (OUTPATIENT)
Dept: LAB | Facility: HOSPITAL | Age: 74
End: 2019-10-17
Attending: ORTHOPAEDIC SURGERY
Payer: COMMERCIAL

## 2019-10-17 DIAGNOSIS — Z01.818 ENCOUNTER FOR PREADMISSION TESTING: ICD-10-CM

## 2019-10-17 LAB
ALBUMIN SERPL BCP-MCNC: 4 G/DL (ref 3.5–5)
ALP SERPL-CCNC: 68 U/L (ref 46–116)
ALT SERPL W P-5'-P-CCNC: 28 U/L (ref 12–78)
ANION GAP SERPL CALCULATED.3IONS-SCNC: 10 MMOL/L (ref 4–13)
AST SERPL W P-5'-P-CCNC: 18 U/L (ref 5–45)
BILIRUB SERPL-MCNC: 0.3 MG/DL (ref 0.2–1)
BUN SERPL-MCNC: 21 MG/DL (ref 5–25)
CALCIUM SERPL-MCNC: 9.8 MG/DL (ref 8.3–10.1)
CHLORIDE SERPL-SCNC: 106 MMOL/L (ref 100–108)
CO2 SERPL-SCNC: 28 MMOL/L (ref 21–32)
CREAT SERPL-MCNC: 0.86 MG/DL (ref 0.6–1.3)
GFR SERPL CREATININE-BSD FRML MDRD: 68 ML/MIN/1.73SQ M
GLUCOSE SERPL-MCNC: 88 MG/DL (ref 65–140)
POTASSIUM SERPL-SCNC: 3.9 MMOL/L (ref 3.5–5.3)
PROT SERPL-MCNC: 7.4 G/DL (ref 6.4–8.2)
SODIUM SERPL-SCNC: 144 MMOL/L (ref 136–145)

## 2019-10-17 PROCEDURE — 80053 COMPREHEN METABOLIC PANEL: CPT

## 2019-10-17 PROCEDURE — 36415 COLL VENOUS BLD VENIPUNCTURE: CPT

## 2019-11-18 ENCOUNTER — HOSPITAL ENCOUNTER (OUTPATIENT)
Dept: CT IMAGING | Facility: HOSPITAL | Age: 74
Discharge: HOME | End: 2019-11-18
Attending: ORTHOPAEDIC SURGERY
Payer: COMMERCIAL

## 2019-11-18 ENCOUNTER — HOSPITAL ENCOUNTER (OUTPATIENT)
Dept: RADIOLOGY | Facility: HOSPITAL | Age: 74
Discharge: HOME | End: 2019-11-18
Attending: ORTHOPAEDIC SURGERY
Payer: COMMERCIAL

## 2019-11-18 DIAGNOSIS — M75.81 ROTATOR CUFF TENDONITIS, RIGHT: ICD-10-CM

## 2019-11-18 PROCEDURE — 77002 NEEDLE LOCALIZATION BY XRAY: CPT

## 2019-11-18 PROCEDURE — 73200 CT UPPER EXTREMITY W/O DYE: CPT

## 2019-11-18 PROCEDURE — 23350 INJECTION FOR SHOULDER X-RAY: CPT

## 2019-11-18 RX ORDER — SODIUM CHLORIDE 9 MG/ML
50 INJECTION INTRAVENOUS
Status: COMPLETED | OUTPATIENT
Start: 2019-11-18 | End: 2019-11-18

## 2019-11-18 RX ORDER — LIDOCAINE HYDROCHLORIDE 10 MG/ML
10 INJECTION, SOLUTION INFILTRATION; PERINEURAL
Status: COMPLETED | OUTPATIENT
Start: 2019-11-18 | End: 2019-11-18

## 2019-11-18 RX ADMIN — SODIUM CHLORIDE 7 ML: 9 INJECTION, SOLUTION INTRAMUSCULAR; INTRAVENOUS; SUBCUTANEOUS at 13:20

## 2019-11-18 RX ADMIN — IOHEXOL 7 ML: 300 INJECTION, SOLUTION INTRAVENOUS at 13:20

## 2019-11-18 RX ADMIN — LIDOCAINE HYDROCHLORIDE 10 ML: 10 INJECTION, SOLUTION INFILTRATION; PERINEURAL at 13:20

## 2019-11-22 ENCOUNTER — OFFICE VISIT (OUTPATIENT)
Dept: OBGYN CLINIC | Facility: CLINIC | Age: 74
End: 2019-11-22
Payer: COMMERCIAL

## 2019-11-22 ENCOUNTER — APPOINTMENT (OUTPATIENT)
Dept: RADIOLOGY | Facility: CLINIC | Age: 74
End: 2019-11-22
Payer: COMMERCIAL

## 2019-11-22 VITALS
HEIGHT: 66 IN | WEIGHT: 158.6 LBS | BODY MASS INDEX: 25.49 KG/M2 | DIASTOLIC BLOOD PRESSURE: 67 MMHG | HEART RATE: 67 BPM | SYSTOLIC BLOOD PRESSURE: 101 MMHG

## 2019-11-22 DIAGNOSIS — M54.2 NECK PAIN: ICD-10-CM

## 2019-11-22 DIAGNOSIS — M54.2 NECK PAIN: Primary | ICD-10-CM

## 2019-11-22 DIAGNOSIS — M79.642 BILATERAL HAND PAIN: ICD-10-CM

## 2019-11-22 DIAGNOSIS — M75.81 ROTATOR CUFF TENDONITIS, RIGHT: ICD-10-CM

## 2019-11-22 DIAGNOSIS — M79.641 BILATERAL HAND PAIN: ICD-10-CM

## 2019-11-22 PROCEDURE — 72052 X-RAY EXAM NECK SPINE 6/>VWS: CPT

## 2019-11-22 PROCEDURE — 99213 OFFICE O/P EST LOW 20 MIN: CPT | Performed by: ORTHOPAEDIC SURGERY

## 2019-12-04 NOTE — PROGRESS NOTES
SUBJECTIVE  Patient returns today for follow-up and to review her CT arthrogram of the right shoulder  Her symptoms are unchanged  She stopped therapy in August   She is very active but has not been doing and organized home exercise program   More than her shoulder issues she is complaining of some bilateral hand pain  ROS:   General: No fever, no chills, no weight loss, no weight gain  HEENT:  No loss of hearing, no nose bleeds, no sore throat  Eyes:  No eye pain, no red eyes, no visual disturbance  Respiratory:  No cough, no shortness of breath, no wheezing  Cardiovascular:  No chest pain, no palpitations, no edema  GI: No abdominal pain, no nausea, no vomiting  Endocrine: No frequent urination, no excessive thirst  Urinary:  No dysuria, no hematuria, no incontinence  Musculoskeletal: see HPI and PE  Skin:  No rash, no wounds  Neurological:  No dizziness, no headache, no numbness  Psychiatric:  No difficulty concentrating, no depression, no suicide thoughts, no anxiety  Review of all other systems is negative    PMH:  Past Medical History:   Diagnosis Date    Disease of thyroid gland        PSH:  Past Surgical History:   Procedure Laterality Date    FL INJECTION RIGHT SHOULDER (ARTHROGRAM)  11/18/2019    SHOULDER SURGERY Right 2012    orif       Medications:  Current Outpatient Medications   Medication Sig Dispense Refill    Ascorbic Acid (VITAMIN C) 1000 MG tablet Take 1,500 mg by mouth 2 (two) times a day      levothyroxine 75 mcg tablet       mirtazapine (REMERON) 15 mg tablet       multivitamin (THERAGRAN) TABS Take 1 tablet by mouth daily      NON FORMULARY        No current facility-administered medications for this visit          Allergies:  No Known Allergies    Family History:  Family History   Problem Relation Age of Onset    Polymyalgia rheumatica Mother     Diabetes Father     Diabetes Sister        Social History:  Social History     Occupational History    Not on file   Tobacco Use    Smoking status: Former Smoker     Types: Cigarettes     Last attempt to quit:      Years since quittin 9    Smokeless tobacco: Never Used   Substance and Sexual Activity    Alcohol use: Yes     Alcohol/week: 1 0 standard drinks     Types: 1 Glasses of wine per week     Frequency: 4 or more times a week     Drinks per session: 1 or 2    Drug use: Not Currently    Sexual activity: Not on file       Physical Exam:  General :  Alert, cooperative, no distress, appears stated age  Blood pressure 101/67, pulse 67, height 5' 5 5" (1 664 m), weight 71 9 kg (158 lb 9 6 oz)  Head:  Normocephalic, without obvious abnormality, atraumatic   Eyes:  Conjunctiva/corneas clear, EOM's intact,   Ears: Both ears normal appearance, no hearing deficits  Nose: Nares normal, septum midline, no drainage    Neck: Supple,  trachea midline, no adenopathy, no tenderness, no mass   Back:   Symmetric, no curvature, ROM normal, no tenderness   Lungs:   Respirations unlabored   Chest Wall:  No tenderness or deformity   Extremities: Extremities normal, atraumatic, no cyanosis or edema      Pulses: 2+ and symmetric   Skin: Skin color, texture, turgor normal, no rashes or lesions      Neurologic: See HPI           Right Shoulder Exam     Tenderness   The patient is experiencing tenderness in the acromion  Range of Motion   Active abduction: 170   External rotation: abnormal Right shoulder external rotation: some limiation and discomfort with ER at 90 degrees  Forward flexion: 170   Internal rotation 90 degrees: 70     Muscle Strength   Abduction: 4/5   Internal rotation: 5/5   External rotation: 4/5     Tests   Apprehension: negative  Campos test: positive  Drop arm: negative  Sulcus: absent        Neck exam  Good range of motion  No vertebral point tenderness  Mild trapezial and paraspinal tenderness    Upper extremity deep tendon reflexes symmetrically diminished  Negative Shaikh's  Subjective complaint of bilateral hand pain and paresthesias    Imaging Studies: The following imaging studies were reviewed in office today  My findings are noted  Cervical spine x-rays 11/22/2017:  Cervical spondylosis C5-6 and C6-7  Mild anterolisthesis C4 over C5 in flexion  CT arthrogram right shoulder 11/18/2019:  No rotator cuff tear  Probable biceps long head tear  Small subacromial spur  Mild to at most moderate glenohumeral arthritis posttraumatic  Status post ORIF right humeral head and neck  Well-healed fracture  Assessment  Encounter Diagnoses   Name Primary?     Neck pain Yes    Bilateral hand pain     Rotator cuff tendonitis, right          Plan:  Home exercise program  Referral to hand service  Offered referral to [facility] re: C spine  F/U prn

## 2020-01-02 ENCOUNTER — APPOINTMENT (OUTPATIENT)
Dept: RADIOLOGY | Facility: CLINIC | Age: 75
End: 2020-01-02
Payer: COMMERCIAL

## 2020-01-02 ENCOUNTER — OFFICE VISIT (OUTPATIENT)
Dept: OBGYN CLINIC | Facility: CLINIC | Age: 75
End: 2020-01-02
Payer: COMMERCIAL

## 2020-01-02 VITALS
SYSTOLIC BLOOD PRESSURE: 120 MMHG | WEIGHT: 160.6 LBS | HEART RATE: 63 BPM | BODY MASS INDEX: 25.81 KG/M2 | DIASTOLIC BLOOD PRESSURE: 75 MMHG | HEIGHT: 66 IN

## 2020-01-02 DIAGNOSIS — M79.641 HAND PAIN, RIGHT: ICD-10-CM

## 2020-01-02 DIAGNOSIS — M79.642 HAND PAIN, LEFT: ICD-10-CM

## 2020-01-02 DIAGNOSIS — M79.642 HAND PAIN, LEFT: Primary | ICD-10-CM

## 2020-01-02 PROCEDURE — 99213 OFFICE O/P EST LOW 20 MIN: CPT | Performed by: ORTHOPAEDIC SURGERY

## 2020-01-02 PROCEDURE — 73130 X-RAY EXAM OF HAND: CPT

## 2020-01-02 NOTE — PROGRESS NOTES
CHIEF COMPLAINT:  Chief Complaint   Patient presents with    Left Hand - Pain    Right Hand - Pain       SUBJECTIVE:  Nahum Urbina is a 69y o  year old  female who presents to the office for evaluation oher left hand  Patient sustained bilateral hand cat bites in May of 2018  Patient seen in the emergency department after the injury where bilateral hand abscesses were drained and patient was provided with IV antibiotics  Patient states that she did not follow up after that due to the schedule at the hand clinic where she was referred  Today patient states that she has increased discomfort in her left hand with motion as well as sharp pains that occur with out aggravation  Patient states that the shooting pains last 3-4 minutes and occur 1 to 2 times a day  Patient states that she has no numbness and tingling  Patient is taking naproxen for pain as needed  PAST MEDICAL HISTORY:  Past Medical History:   Diagnosis Date    Disease of thyroid gland        PAST SURGICAL HISTORY:  Past Surgical History:   Procedure Laterality Date    FL INJECTION RIGHT SHOULDER (ARTHROGRAM)  2019    SHOULDER SURGERY Right 2012    orif       FAMILY HISTORY:  Family History   Problem Relation Age of Onset    Polymyalgia rheumatica Mother     Diabetes Father     Diabetes Sister        SOCIAL HISTORY:  Social History     Tobacco Use    Smoking status: Former Smoker     Types: Cigarettes     Last attempt to quit: 1982     Years since quittin 0    Smokeless tobacco: Never Used   Substance Use Topics    Alcohol use:  Yes     Alcohol/week: 1 0 standard drinks     Types: 1 Glasses of wine per week     Frequency: 4 or more times a week     Drinks per session: 1 or 2    Drug use: Not Currently       MEDICATIONS:    Current Outpatient Medications:     Ascorbic Acid (VITAMIN C) 1000 MG tablet, Take 1,500 mg by mouth 2 (two) times a day, Disp: , Rfl:     levothyroxine 75 mcg tablet, , Disp: , Rfl:     mirtazapine (REMERON) 15 mg tablet, , Disp: , Rfl:     multivitamin (THERAGRAN) TABS, Take 1 tablet by mouth daily, Disp: , Rfl:     NON FORMULARY, , Disp: , Rfl:     ALLERGIES:  No Known Allergies    REVIEW OF SYSTEMS:  Review of Systems   Constitutional: Negative for chills, fever and unexpected weight change  HENT: Negative for hearing loss, nosebleeds and sore throat  Eyes: Negative for pain, redness and visual disturbance  Respiratory: Negative for cough, shortness of breath and wheezing  Cardiovascular: Negative for chest pain, palpitations and leg swelling  Gastrointestinal: Negative for abdominal pain, nausea and vomiting  Endocrine: Negative for polydipsia and polyuria  Genitourinary: Negative for dysuria and hematuria  Skin: Negative for rash and wound  Neurological: Negative for dizziness and headaches  Psychiatric/Behavioral: Negative for decreased concentration, dysphoric mood and suicidal ideas  The patient is not nervous/anxious          VITALS:  Vitals:    01/02/20 1420   BP: 120/75   Pulse: 63       LABS:  HgA1c: No results found for: HGBA1C  BMP:   Lab Results   Component Value Date    CALCIUM 9 8 10/17/2019    K 3 9 10/17/2019    CO2 28 10/17/2019     10/17/2019    BUN 21 10/17/2019    CREATININE 0 86 10/17/2019       _____________________________________________________  PHYSICAL EXAMINATION:  General: well developed and well nourished, alert, oriented times 3 and appears comfortable  Psychiatric: Normal  HEENT: Trachea Midline, No torticollis  Pulmonary: No audible wheezing or strider  Cardiovascular: No discernable arrhythmia   Skin: No masses, erythema, lacerations, fluctation, ulcerations  Neurovascular: Sensation Intact to the Median, Ulnar, Radial Nerve, Motor Intact to the Median, Ulnar, Radial Nerve and Pulses Intact    MUSCULOSKELETAL EXAMINATION:  Left hand  No swelling erythema or ecchymosis  Negative carpal tunnel compression  Full motion of the wrist and fingers without pain  Good strength of all fingers with resisted flexion and extension  Patient is able to make full composite fist without pain  Good strength with wrist with testing on all planes  No clicking or locking or palpable nodule at the A1 pulley on all fingers      ___________________________________________________  STUDIES REVIEWED:  Images obtained today of the bilateral hands 3 views demonstrate No acute fractures or dislocations no signs of osteomyelitis      PROCEDURES PERFORMED:  Procedures  No Procedures performed today    _____________________________________________________  ASSESSMENT/PLAN:    Left hand pain  * X-rays reviewed with patient showing no structural abnormalities  * order for therapy was placed to work on motion and strengthening  * patient was advised that if she does not have relief of discomfort we will consider an MRI        Follow Up:  Return in about 6 weeks (around 2/13/2020)        To Do Next Visit:  Re-evaluation of current issue      Scribe Attestation    I,:   Susie Oreilly am acting as a scribe while in the presence of the attending physician :        I,:   Jose Pickett MD personally performed the services described in this documentation    as scribed in my presence :

## 2020-05-26 RX ORDER — ALBUTEROL SULFATE 90 UG/1
AEROSOL, METERED RESPIRATORY (INHALATION)
Qty: 6.7 INHALER | Refills: 0 | OUTPATIENT
Start: 2020-05-26

## 2020-07-08 ENCOUNTER — OFFICE VISIT (OUTPATIENT)
Dept: OBGYN CLINIC | Facility: CLINIC | Age: 75
End: 2020-07-08
Payer: COMMERCIAL

## 2020-07-08 ENCOUNTER — APPOINTMENT (OUTPATIENT)
Dept: RADIOLOGY | Facility: CLINIC | Age: 75
End: 2020-07-08
Payer: COMMERCIAL

## 2020-07-08 VITALS
WEIGHT: 161.2 LBS | BODY MASS INDEX: 25.91 KG/M2 | DIASTOLIC BLOOD PRESSURE: 78 MMHG | HEIGHT: 66 IN | HEART RATE: 79 BPM | SYSTOLIC BLOOD PRESSURE: 111 MMHG

## 2020-07-08 DIAGNOSIS — M25.511 RIGHT SHOULDER PAIN, UNSPECIFIED CHRONICITY: ICD-10-CM

## 2020-07-08 DIAGNOSIS — M25.551 PAIN IN RIGHT HIP: ICD-10-CM

## 2020-07-08 DIAGNOSIS — Z87.81 S/P ORIF (OPEN REDUCTION INTERNAL FIXATION) FRACTURE: ICD-10-CM

## 2020-07-08 DIAGNOSIS — M16.11 PRIMARY OSTEOARTHRITIS OF RIGHT HIP: ICD-10-CM

## 2020-07-08 DIAGNOSIS — Z98.890 S/P ORIF (OPEN REDUCTION INTERNAL FIXATION) FRACTURE: ICD-10-CM

## 2020-07-08 DIAGNOSIS — M25.511 RIGHT SHOULDER PAIN, UNSPECIFIED CHRONICITY: Primary | ICD-10-CM

## 2020-07-08 PROCEDURE — 73030 X-RAY EXAM OF SHOULDER: CPT

## 2020-07-08 PROCEDURE — 73502 X-RAY EXAM HIP UNI 2-3 VIEWS: CPT

## 2020-07-08 PROCEDURE — 99213 OFFICE O/P EST LOW 20 MIN: CPT | Performed by: ORTHOPAEDIC SURGERY

## 2020-07-08 RX ORDER — MELOXICAM 15 MG/1
15 TABLET ORAL DAILY
Qty: 30 TABLET | Refills: 2 | Status: SHIPPED | OUTPATIENT
Start: 2020-07-08 | End: 2022-06-15 | Stop reason: ALTCHOICE

## 2020-07-08 NOTE — PROGRESS NOTES
Chief Complaint   Patient presents with    Right Shoulder - Pain         Subjective   Patient here following up regarding her right shoulder also has complaints of right hip pain  Patient states she has been having pain over the lateral aspect of the hip and at times it radiates down her leg to her foot  She has seen a doctor who told her to do home exercise program which she has not done yet  This has been going on for few months now  Pain comes and goes  Pain bad at night when trying to sleep  She denies any numbness in the lower extremity  Regarding her right shoulder, patient has had surgery several years ago  She had ORIF right proximal humerus  Couple weeks ago she was going down some stairs when she tripped and fell bruising her right-sided ribs and right arm  Patient was concerned about the hardware  ROS:  Review of systems form reviewed July 8, 2020   General: no fever, no chills  Respiratory:  No coughing, shortness of breath or wheezing  Cardiovascular:  No chest pain, no palpitations  Musculoskeletal: see HPI and PE  SKIN:  No skin rash, no dry skin  Neurological:  No headaches, no confusion  Psychiatric:  No suicide thoughts, no anxiety, no depression  Review of all other systems is negative    Past Medical History:   Diagnosis Date    Disease of thyroid gland        Current Outpatient Medications on File Prior to Visit   Medication Sig Dispense Refill    Ascorbic Acid (VITAMIN C) 1000 MG tablet Take 1,500 mg by mouth 2 (two) times a day      levothyroxine 75 mcg tablet       mirtazapine (REMERON) 15 mg tablet       multivitamin (THERAGRAN) TABS Take 1 tablet by mouth daily      NON FORMULARY        No current facility-administered medications on file prior to visit          No Known Allergies      Physical Exam:    Vitals:    07/08/20 1605   BP: 111/78   Pulse: 79   Weight: 73 1 kg (161 lb 3 2 oz)   Height: 5' 5 5" (1 664 m)       General Appearance:  Alert, cooperative, no distress, appears stated age   Lungs:   respirations unlabored   Heart:  Normal heart rate noted   Abdomen:   Soft, non-tender,  no masses   Extremities: Extremities normal, atraumatic, no cyanosis or edema   Pulses: 2+ and symmetric   Skin: Skin color, texture, turgor normal, no rashes or lesions   Neurologic: Normal         Ortho Exam  Examination right shoulder shows well-healed incision  Patient with normal full range of motion with no pain  Slight ecchymosis noted over the lateral aspect elbow  Normal strength right upper extremity  Sensation is intact light touch right upper extremity    Examination right hip skin intact no erythema  Slight tenderness over the greater trochanter  Hip with normal range of motion and no groin pain    Imaging  X-rays of the right shoulder shows orthopedic hardware in acceptable position with no evidence of loosening or hardware breakage    X-rays of the right hip shows mild degenerative joint disease    ASSESSMENT:    Krysten Garcia was seen today for pain  Diagnoses and all orders for this visit:    Right shoulder pain, unspecified chronicity  -     XR shoulder 2+ vw right; Future  -     meloxicam (MOBIC) 15 mg tablet; Take 1 tablet (15 mg total) by mouth daily    S/P ORIF (open reduction internal fixation) fracture  -     meloxicam (MOBIC) 15 mg tablet; Take 1 tablet (15 mg total) by mouth daily    Primary osteoarthritis of right hip  -     XR hip/pelv 2-3 vws right if performed; Future          PLAN: S/P right shoulder ORIF doing well  X-rays today  No fracture orsubluxation and hardware is stable and in acceptable position  As far as her right leg goes her symptoms seem more like a lumbar radiculopathy  She has burning type pain shooting from her lateral hip down to her ankle  We discussed treatment options today  Patient was offered cortisone injection for trochanteric bursitis to rule that out  Patient was also offered formal physical therapy for her back and hip    Patient refused both and said she would rather do her home exercises like she was told to do by her PCP  We also offered to give her referral to a spine specialist to evaluate her lower back but patient declined that as well  Will send meloxicam to her pharmacy for patient to use once a day as needed for pain  She understands this is a NSAID and not to use any other types of NSAIDs while taking this  If she changes her mind and would like to see a spine specialist she will give us a call    She will follow up with us as needed    Scribe Attestation    I,:   Dago Trujillo PA-C am acting as a scribe while in the presence of the attending physician :        I,:   Jenetta Shone, MD personally performed the services described in this documentation    as scribed in my presence :

## 2021-09-10 ENCOUNTER — HOSPITAL ENCOUNTER (EMERGENCY)
Facility: HOSPITAL | Age: 76
Discharge: HOME | End: 2021-09-10
Attending: EMERGENCY MEDICINE
Payer: COMMERCIAL

## 2021-09-10 VITALS
OXYGEN SATURATION: 98 % | HEART RATE: 73 BPM | TEMPERATURE: 98 F | RESPIRATION RATE: 18 BRPM | SYSTOLIC BLOOD PRESSURE: 151 MMHG | DIASTOLIC BLOOD PRESSURE: 75 MMHG

## 2021-09-10 DIAGNOSIS — T14.8XXA ANIMAL BITE: Primary | ICD-10-CM

## 2021-09-10 PROCEDURE — 90715 TDAP VACCINE 7 YRS/> IM: CPT | Performed by: PHYSICIAN ASSISTANT

## 2021-09-10 PROCEDURE — 90471 IMMUNIZATION ADMIN: CPT

## 2021-09-10 PROCEDURE — 99283 EMERGENCY DEPT VISIT LOW MDM: CPT

## 2021-09-10 PROCEDURE — 99284 EMERGENCY DEPT VISIT MOD MDM: CPT | Performed by: PHYSICIAN ASSISTANT

## 2021-09-10 RX ORDER — CLOBETASOL PROPIONATE 0.5 MG/G
OINTMENT TOPICAL 2 TIMES DAILY
Qty: 30 G | Refills: 0 | Status: SHIPPED | OUTPATIENT
Start: 2021-09-10

## 2021-09-10 RX ORDER — AMOXICILLIN AND CLAVULANATE POTASSIUM 875; 125 MG/1; MG/1
1 TABLET, FILM COATED ORAL EVERY 12 HOURS
Qty: 20 TABLET | Refills: 0 | Status: SHIPPED | OUTPATIENT
Start: 2021-09-10 | End: 2021-09-20

## 2021-09-10 RX ORDER — AMOXICILLIN AND CLAVULANATE POTASSIUM 875; 125 MG/1; MG/1
1 TABLET, FILM COATED ORAL ONCE
Status: COMPLETED | OUTPATIENT
Start: 2021-09-10 | End: 2021-09-10

## 2021-09-10 RX ADMIN — AMOXICILLIN AND CLAVULANATE POTASSIUM 1 TABLET: 875; 125 TABLET, FILM COATED ORAL at 09:16

## 2021-09-10 RX ADMIN — TETANUS TOXOID, REDUCED DIPHTHERIA TOXOID AND ACELLULAR PERTUSSIS VACCINE, ADSORBED 0.5 ML: 5; 2.5; 8; 8; 2.5 SUSPENSION INTRAMUSCULAR at 09:15

## 2021-09-10 NOTE — ED PROVIDER NOTES
History  Chief Complaint   Patient presents with    Animal Bite     at bites and scratches to b/l hands/arms     Patient is 81-year-old female presents emergency department complaints of cat scratches and bites to left hand  Patient states that she has been trying to catch a feral cat in  She states that today she picked up the kitten and been scratched her left arm  Can not does not have any vaccinations  Tetanus status of the patient is unknown  Prior to Admission Medications   Prescriptions Last Dose Informant Patient Reported? Taking? Ascorbic Acid (VITAMIN C) 1000 MG tablet  Self Yes No   Sig: Take 1,500 mg by mouth 2 (two) times a day   NON FORMULARY  Self Yes No   levothyroxine 75 mcg tablet  Self Yes No   meloxicam (MOBIC) 15 mg tablet   No No   Sig: Take 1 tablet (15 mg total) by mouth daily   mirtazapine (REMERON) 15 mg tablet  Self Yes No   multivitamin (THERAGRAN) TABS  Self Yes No   Sig: Take 1 tablet by mouth daily      Facility-Administered Medications: None       Past Medical History:   Diagnosis Date    Disease of thyroid gland        Past Surgical History:   Procedure Laterality Date    FL INJECTION RIGHT SHOULDER (ARTHROGRAM)  2019    JOINT REPLACEMENT      SHOULDER SURGERY Right 2012    orif       Family History   Problem Relation Age of Onset    Polymyalgia rheumatica Mother     Diabetes Father     Diabetes Sister      I have reviewed and agree with the history as documented  E-Cigarette/Vaping     E-Cigarette/Vaping Substances     Social History     Tobacco Use    Smoking status: Former Smoker     Types: Cigarettes     Quit date:      Years since quittin 7    Smokeless tobacco: Never Used   Substance Use Topics    Alcohol use: Yes     Alcohol/week: 1 0 standard drinks     Types: 1 Glasses of wine per week    Drug use: Never       Review of Systems   Constitutional: Negative for fever  Cardiovascular: Negative for chest pain     Gastrointestinal: Negative for abdominal pain  Skin: Positive for wound  All other systems reviewed and are negative  Physical Exam  Physical Exam  Vitals reviewed  Constitutional:       Appearance: Normal appearance  HENT:      Head: Normocephalic and atraumatic  Cardiovascular:      Rate and Rhythm: Normal rate and regular rhythm  Pulses: Normal pulses  Heart sounds: Normal heart sounds  Pulmonary:      Effort: Pulmonary effort is normal       Breath sounds: Normal breath sounds  Musculoskeletal:        Arms:    Skin:     General: Skin is warm  Capillary Refill: Capillary refill takes less than 2 seconds  Neurological:      General: No focal deficit present  Mental Status: She is alert and oriented to person, place, and time  Psychiatric:         Mood and Affect: Mood normal          Behavior: Behavior normal          Thought Content:  Thought content normal          Judgment: Judgment normal          Vital Signs  ED Triage Vitals [09/10/21 0835]   Temperature Pulse Respirations Blood Pressure SpO2   98 °F (36 7 °C) 73 18 151/75 98 %      Temp Source Heart Rate Source Patient Position - Orthostatic VS BP Location FiO2 (%)   Oral Monitor Lying Right arm --      Pain Score       --           Vitals:    09/10/21 0835   BP: 151/75   Pulse: 73   Patient Position - Orthostatic VS: Lying         Visual Acuity      ED Medications  Medications   amoxicillin-clavulanate (AUGMENTIN) 875-125 mg per tablet 1 tablet (1 tablet Oral Given 9/10/21 0916)   tetanus-diphtheria-acellular pertussis (BOOSTRIX) IM injection 0 5 mL (0 5 mL Intramuscular Given 9/10/21 0915)       Diagnostic Studies  Results Reviewed     None                 No orders to display              Procedures  Procedures         ED Course               Identification of Seniors at Risk      Most Recent Value   (ISAR) Identification of Seniors at Risk   Before the illness or injury that brought you to the Emergency, did you need someone to help you on a regular basis? 0 Filed at: 09/10/2021 0836   In the last 24 hours, have you needed more help than usual?  0 Filed at: 09/10/2021 2439   Have you been hospitalized for one or more nights during the past 6 months? 0 Filed at: 09/10/2021 0836   In general, do you see well?  0 Filed at: 09/10/2021 0836   In general, do you have serious problems with your memory? 0 Filed at: 09/10/2021 0867   Do you take more than three different medications every day?  0 Filed at: 09/10/2021 0836   ISAR Score  0 Filed at: 09/10/2021 3939                    SBIRT 20yo+      Most Recent Value   SBIRT (25 yo +)   In order to provide better care to our patients, we are screening all of our patients for alcohol and drug use  Would it be okay to ask you these screening questions? Yes Filed at: 09/10/2021 9334   Initial Alcohol Screen: US AUDIT-C    1  How often do you have a drink containing alcohol?  0 Filed at: 09/10/2021 0838   2  How many drinks containing alcohol do you have on a typical day you are drinking? 0 Filed at: 09/10/2021 0838   3b  FEMALE Any Age, or MALE 65+: How often do you have 4 or more drinks on one occassion? 0 Filed at: 09/10/2021 0838   Audit-C Score  0 Filed at: 09/10/2021 1164   BRENDEN: How many times in the past year have you    Used an illegal drug or used a prescription medication for non-medical reasons? Never Filed at: 09/10/2021 1859                    MDM  Number of Diagnoses or Management Options  Animal bite  Diagnosis management comments: Patient is 72-year-old female presents emergency department complaints of cat scratches and bites to left hand  Patient states that she has been trying to catch a feral cat in  She states that today she picked up the kitten and been scratched her left arm  Can not does not have any vaccinations  Tetanus status of the patient is unknown  Examination left upper extremity, she is neurovascularly intact    There is multiple abrasions and scratches noted along left forearm  There is no evidence of infectious process  There was no bleeding noted  Tetanus status was updated  Patient started on Augmentin  Recommend rabies immunoglobin and vaccination, patient declined  Return parameters were discussed at length  Patient stable for discharge  Risk of Complications, Morbidity, and/or Mortality  Presenting problems: low  Diagnostic procedures: low  Management options: low    Patient Progress  Patient progress: stable      Disposition  Final diagnoses:   Animal bite     Time reflects when diagnosis was documented in both MDM as applicable and the Disposition within this note     Time User Action Codes Description Comment    9/10/2021  9:03 AM Paul Tidwell Add Nate Keanu  8XXA] Animal bite       ED Disposition     ED Disposition Condition Date/Time Comment    Discharge Good Fri Sep 10, 2021 [address] discharge to home/self care              Follow-up Information     Follow up With Specialties Details Why Contact Info    Gisella Arndt MD Internal Medicine Schedule an appointment as soon as possible for a visit   [address] [place]  [phone]            Discharge Medication List as of 9/10/2021  9:04 AM      START taking these medications    Details   amoxicillin-clavulanate (AUGMENTIN) 875-125 mg per tablet Take 1 tablet by mouth every 12 (twelve) hours for 10 days, Starting Fri 9/10/2021, Until Mon 9/20/2021, Normal      clobetasol (TEMOVATE) 0 05 % ointment Apply topically 2 (two) times a day, Starting Fri 9/10/2021, Normal         CONTINUE these medications which have NOT CHANGED    Details   Ascorbic Acid (VITAMIN C) 1000 MG tablet Take 1,500 mg by mouth 2 (two) times a day, Historical Med      levothyroxine 75 mcg tablet Starting Thu 6/6/2019, Historical Med      meloxicam (MOBIC) 15 mg tablet Take 1 tablet (15 mg total) by mouth daily, Starting Wed 7/8/2020, Normal      mirtazapine (REMERON) 15 mg tablet Starting Thu 5/23/2019, Historical Med      multivitamin (THERAGRAN) TABS Take 1 tablet by mouth daily, Historical Med      NON FORMULARY Historical Med           No discharge procedures on file      PDMP Review     None          ED Provider  Electronically Signed by           Griselda Carl, PA-C  09/10/21 0336

## 2022-05-02 ENCOUNTER — OFFICE VISIT (OUTPATIENT)
Dept: OBGYN CLINIC | Facility: CLINIC | Age: 77
End: 2022-05-02
Payer: COMMERCIAL

## 2022-05-02 ENCOUNTER — APPOINTMENT (OUTPATIENT)
Dept: RADIOLOGY | Facility: CLINIC | Age: 77
End: 2022-05-02
Payer: COMMERCIAL

## 2022-05-02 VITALS
HEIGHT: 66 IN | WEIGHT: 161 LBS | DIASTOLIC BLOOD PRESSURE: 71 MMHG | BODY MASS INDEX: 25.88 KG/M2 | SYSTOLIC BLOOD PRESSURE: 111 MMHG | HEART RATE: 68 BPM

## 2022-05-02 DIAGNOSIS — M62.9 HAMSTRING TIGHTNESS OF BOTH LOWER EXTREMITIES: ICD-10-CM

## 2022-05-02 DIAGNOSIS — R29.898 WEAKNESS OF BACK: ICD-10-CM

## 2022-05-02 DIAGNOSIS — M47.816 LUMBAR FACET ARTHROPATHY: Primary | ICD-10-CM

## 2022-05-02 DIAGNOSIS — S39.012A LUMBAR STRAIN, INITIAL ENCOUNTER: ICD-10-CM

## 2022-05-02 DIAGNOSIS — R19.8 ABDOMINAL WEAKNESS: ICD-10-CM

## 2022-05-02 DIAGNOSIS — M47.816 LUMBAR FACET ARTHROPATHY: ICD-10-CM

## 2022-05-02 PROCEDURE — 99214 OFFICE O/P EST MOD 30 MIN: CPT | Performed by: FAMILY MEDICINE

## 2022-05-02 PROCEDURE — 72110 X-RAY EXAM L-2 SPINE 4/>VWS: CPT

## 2022-05-02 RX ORDER — DICLOFENAC SODIUM 75 MG/1
75 TABLET, DELAYED RELEASE ORAL 2 TIMES DAILY
Qty: 60 TABLET | Refills: 2 | Status: SHIPPED | OUTPATIENT
Start: 2022-05-02

## 2022-05-02 NOTE — PROGRESS NOTES
Assessment/Plan:  Assessment/Plan   Diagnoses and all orders for this visit:    Lumbar facet arthropathy  -     XR spine lumbar minimum 4 views non injury; Future  -     Ambulatory Referral to Physical Therapy; Future  -     diclofenac (VOLTAREN) 75 mg EC tablet; Take 1 tablet (75 mg total) by mouth 2 (two) times a day    Lumbar strain, initial encounter  -     Ambulatory Referral to Physical Therapy; Future  -     diclofenac (VOLTAREN) 75 mg EC tablet; Take 1 tablet (75 mg total) by mouth 2 (two) times a day    Abdominal weakness  -     Ambulatory Referral to Physical Therapy; Future  -     diclofenac (VOLTAREN) 75 mg EC tablet; Take 1 tablet (75 mg total) by mouth 2 (two) times a day    Weakness of back  -     Ambulatory Referral to Physical Therapy; Future  -     diclofenac (VOLTAREN) 75 mg EC tablet; Take 1 tablet (75 mg total) by mouth 2 (two) times a day    Hamstring tightness of both lower extremities  -     Ambulatory Referral to Physical Therapy; Future  -     diclofenac (VOLTAREN) 75 mg EC tablet; Take 1 tablet (75 mg total) by mouth 2 (two) times a day      29-year-old active female with low back pain of many years duration  Discussed with patient physical exam, imaging studies, impression and plan  X-rays lumbar spine noted for degenerative facet changes  Physical exam lumbar spine currently unremarkable for midline or paraspinal tenderness  She has limited range of motion with rotating to both sides  She has intact strength and sensation both lower extremities  Straight leg raise unremarkable bilaterally  There is no groin pain with EVERETTE and FADDIR maneuvers of the hips  She has hamstring tightness both lower extremities  Clinical impression is that she is symptomatic from degenerative changes of lumbar spine and strain  I discussed treatment regimen of anti-inflammatory, supplements, and formal therapy  I  Advised that surgery is not warranted    She is to take diclofenac 75 mg twice daily with food for 30 days and during that time not to take any ibuprofen or Aleve, but may take Tylenol  She is to start taking tumeric at least 1000 mg daily and tart cherry at least 1000 mg daily  She may apply topical diclofenac gel 3 to 4 times a day as needed  She is to start physical therapy as soon as possible and do home exercises as directed  She will follow up as needed  Subjective:   Patient ID: Armand Segura is a 78 y o  female  Chief Complaint   Patient presents with    Lower Back - Pain       81-year-old active female presents for evaluation of low back pain many years duration  She denies any particular trauma or inciting event  Pain described as gradual in onset, generalized to lumbar spine, radiating to the right lower hip and distally to the right foot, achy and sore, worse with bending and twisting, and improved resting  She currently works as a  and takes a lot of photographs  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 and then after repeatedly bend down to pick them up and reposition to display some  After doing this she would have worsening pain that last for several days  She  manages symptoms with taking Aleve  Back Pain  This is a chronic problem  The current episode started more than 1 year ago  The problem occurs daily  The problem has been waxing and waning  Associated symptoms include arthralgias  Pertinent negatives include no abdominal pain, chest pain, chills, fever, joint swelling, numbness, rash, sore throat or weakness  The symptoms are aggravated by bending and twisting  She has tried rest, position changes and NSAIDs for the symptoms  The treatment provided mild relief  The following portions of the patient's history were reviewed and updated as appropriate: She  has a past medical history of Disease of thyroid gland  She  has a past surgical history that includes Shoulder surgery (Right, 2012);  FL injection right shoulder (arthrogram) (11/18/2019); Joint replacement; and Knee surgery (Left, 2021)  Her family history includes Diabetes in her father and sister; Polymyalgia rheumatica in her mother  She  reports that she quit smoking about 40 years ago  Her smoking use included cigarettes  She has never used smokeless tobacco  She reports current alcohol use of about 1 0 standard drink of alcohol per week  She reports that she does not use drugs  She has No Known Allergies       Review of Systems   Constitutional: Negative for chills and fever  HENT: Negative for sore throat  Eyes: Negative for visual disturbance  Respiratory: Negative for shortness of breath  Cardiovascular: Negative for chest pain  Gastrointestinal: Negative for abdominal pain  Genitourinary: Negative for flank pain  Musculoskeletal: Positive for arthralgias and back pain  Negative for joint swelling  Skin: Negative for rash and wound  Neurological: Negative for weakness and numbness  Hematological: Does not bruise/bleed easily  Psychiatric/Behavioral: Negative for self-injury  Objective:  Vitals:    05/02/22 0911   BP: 111/71   Pulse: 68   Weight: 73 kg (161 lb)   Height: 5' 5 5" (1 664 m)     Right Ankle Exam     Muscle Strength   Dorsiflexion:  5/5  Plantar flexion:  5/5      Left Ankle Exam     Muscle Strength   Dorsiflexion:  5/5   Plantar flexion:  5/5       Right Hip Exam     Muscle Strength   Flexion: 5/5     Tests   EVERETTE: negative    Comments:  Negative FADDIR  Hamstring tightness      Left Hip Exam     Muscle Strength   Flexion: 5/5     Tests   EVERETTE: negative    Comments:  Negative FADDIR  Hamstring tightness      Back Exam     Tenderness   The patient is experiencing no tenderness       Range of Motion   Extension: normal   Flexion: normal   Lateral bend right: normal   Lateral bend left: normal   Rotation right: abnormal   Rotation left: abnormal     Muscle Strength   Right Quadriceps:  5/5   Left Quadriceps:  5/5     Tests   Straight leg raise right: negative  Straight leg raise left: negative    Reflexes   Patellar: normal    Other   Sensation: normal  Gait: normal           Strength/Myotome Testing     Left Ankle/Foot   Dorsiflexion: 5  Plantar flexion: 5    Right Ankle/Foot   Dorsiflexion: 5  Plantar flexion: 5      Physical Exam  Vitals and nursing note reviewed  Constitutional:       General: She is not in acute distress  Appearance: She is well-developed  She is not ill-appearing or diaphoretic  HENT:      Head: Normocephalic  Right Ear: External ear normal       Left Ear: External ear normal    Eyes:      Conjunctiva/sclera: Conjunctivae normal    Neck:      Trachea: No tracheal deviation  Cardiovascular:      Rate and Rhythm: Normal rate  Pulmonary:      Effort: Pulmonary effort is normal  No respiratory distress  Abdominal:      General: There is no distension  Musculoskeletal:         General: No swelling, tenderness, deformity or signs of injury  Lumbar back: Negative right straight leg raise test and negative left straight leg raise test    Skin:     General: Skin is warm and dry  Coloration: Skin is not jaundiced or pale  Neurological:      Mental Status: She is alert and oriented to person, place, and time  Psychiatric:         Mood and Affect: Mood normal          Behavior: Behavior normal          Thought Content: Thought content normal          Judgment: Judgment normal          I have personally reviewed pertinent films in PACS and my interpretation is Mild degenerative facet changes

## 2022-06-15 ENCOUNTER — TELEPHONE (OUTPATIENT)
Dept: FAMILY MEDICINE CLINIC | Facility: CLINIC | Age: 77
End: 2022-06-15

## 2022-06-15 ENCOUNTER — OFFICE VISIT (OUTPATIENT)
Dept: FAMILY MEDICINE CLINIC | Facility: CLINIC | Age: 77
End: 2022-06-15
Payer: COMMERCIAL

## 2022-06-15 VITALS
DIASTOLIC BLOOD PRESSURE: 70 MMHG | OXYGEN SATURATION: 95 % | TEMPERATURE: 97.4 F | BODY MASS INDEX: 26.03 KG/M2 | HEART RATE: 64 BPM | SYSTOLIC BLOOD PRESSURE: 112 MMHG | HEIGHT: 66 IN | WEIGHT: 162 LBS

## 2022-06-15 DIAGNOSIS — R35.0 URINARY FREQUENCY: ICD-10-CM

## 2022-06-15 DIAGNOSIS — H93.11 TINNITUS OF RIGHT EAR: Primary | ICD-10-CM

## 2022-06-15 DIAGNOSIS — M25.551 RIGHT HIP PAIN: ICD-10-CM

## 2022-06-15 DIAGNOSIS — M79.674 GREAT TOE PAIN, RIGHT: ICD-10-CM

## 2022-06-15 DIAGNOSIS — R53.83 FATIGUE, UNSPECIFIED TYPE: ICD-10-CM

## 2022-06-15 DIAGNOSIS — E03.9 HYPOTHYROIDISM, UNSPECIFIED TYPE: ICD-10-CM

## 2022-06-15 DIAGNOSIS — F33.41 RECURRENT MAJOR DEPRESSIVE DISORDER, IN PARTIAL REMISSION (HCC): ICD-10-CM

## 2022-06-15 DIAGNOSIS — C44.310 BASAL CELL CARCINOMA (BCC) OF SKIN OF FACE, UNSPECIFIED PART OF FACE: ICD-10-CM

## 2022-06-15 DIAGNOSIS — M79.642 PAIN IN BOTH HANDS: ICD-10-CM

## 2022-06-15 DIAGNOSIS — K08.9 POOR DENTITION: ICD-10-CM

## 2022-06-15 DIAGNOSIS — R05.9 COUGH: ICD-10-CM

## 2022-06-15 DIAGNOSIS — M79.641 PAIN IN BOTH HANDS: ICD-10-CM

## 2022-06-15 DIAGNOSIS — T78.40XA ALLERGY, INITIAL ENCOUNTER: ICD-10-CM

## 2022-06-15 DIAGNOSIS — Z13.6 SCREENING FOR CARDIOVASCULAR CONDITION: ICD-10-CM

## 2022-06-15 PROCEDURE — 1100F PTFALLS ASSESS-DOCD GE2>/YR: CPT

## 2022-06-15 PROCEDURE — 99204 OFFICE O/P NEW MOD 45 MIN: CPT

## 2022-06-15 PROCEDURE — 3008F BODY MASS INDEX DOCD: CPT

## 2022-06-15 NOTE — PROGRESS NOTES
Assessment/Plan:         Problem List Items Addressed This Visit        Digestive    Poor dentition     Patient has had multiple surgeries to her teeth in the past due to an accident that she had when she was 13, currently stable  Endocrine    Hypothyroidism     Have lab work, will call with results  Musculoskeletal and Integument    Basal cell carcinoma (BCC) of skin of face     3 or 4 surgeries in the past  Referral to dermatology to assess previous site of basal cell that is now itchy  Patient would also like a few moles evaluated  Relevant Orders    Ambulatory Referral to Dermatology       Other    Tinnitus of right ear - Primary     Referral to ENT placed  Relevant Orders    Ambulatory Referral to Otolaryngology    Great toe pain, right     Have X ray and lab work done, will call with results  Relevant Orders    XR foot 3+ vw right    C-reactive protein    RF Screen w/ Reflex to Titer    Lyme Antibody Profile with reflex to WB    Recurrent major depressive disorder, in partial remission (HCC)     Continue mirtazapine  Will continue to follow  Patient feels that her melancholy comes and goes and has slowly reduced her dose of mirtazapine herself  Pain in both hands     Tinel's negative today  Have lab work done, will call with results  Relevant Orders    VADIM Screen w/ Reflex to Titer/Pattern    C-reactive protein    RF Screen w/ Reflex to Titer    Lyme Antibody Profile with reflex to WB    Urinary frequency     Suggest reducing coffee, carbonated drinks, limiting drinking 2 hours before bed  Discussed pelvic floor PT and proper emptying  Will re evaluate at next visit  Right hip pain     Continue diclofenac and PT  Suggest cherry extract and tumeric              Relevant Orders    VADIM Screen w/ Reflex to Titer/Pattern    C-reactive protein    RF Screen w/ Reflex to Titer    Lyme Antibody Profile with reflex to WB    Cough 13 year history of heavy smoking, has what she feels is a lot of mucous and a smokers cough  Have chest xray, will call with results  Relevant Orders    XR chest pa & lateral    Allergies     Take Zyrtec as needed for allergies  Screening for cardiovascular condition     Have lab work, will call with results  Relevant Orders    CBC and differential    Comprehensive metabolic panel    Lipid panel    Fatigue     Have lab work, will call with results  Relevant Orders    RF Screen w/ Reflex to Titer    TSH, 3rd generation with Free T4 reflex    Lyme Antibody Profile with reflex to WB    Vitamin D 25 hydroxy            Subjective:      Patient ID: Joyce Dale is a 78 y o  female  Liya Carrera is here to establish care  She has transitioned from [address] [place] [address] to [facility]  She is having very bad pain in the right leg and hip, Dr Cristin Prado told her she has inflammation  She is not taking Voltaren cream the diclofenac oral is working but she does not like taking it  She also has urinary frequency  tinnitus she is a swimmer but the tinnitus is new within the last year  She also has been having fullness in her ears  She also has an area on her upper back that is itching, on her right shoulder  She also has moles that need looked at  She also has a recurrent wart on her finger  She also has poor dentition from a previous injury at 7 and has had multiple root canals  Her right great toe has been painful with radiating pain up the leg  She has had multiple rolled ankles in her life, she has been wearing negative heel shoes and using arnica jell and has had no issue  She had a left knee replacement last year from years of running and now has right knee pain  She has had 3-4 basal cell surgeries  She was trying to do yard work and now has developed bilateral hand/wrist pain   3-4 years ago she was painting silk and in the car with her cats and her cat bit and clawed both her hands and now thinks she may have nerve damage  She also has fatigue associated with eating lunch  The following portions of the patient's history were reviewed and updated as appropriate:   Past Medical History:  She has a past medical history of Disease of thyroid gland  ,  _______________________________________________________________________  Medical Problems:  does not have any pertinent problems on file ,  _______________________________________________________________________  Past Surgical History:   has a past surgical history that includes Shoulder surgery (Right, 2012); FL injection right shoulder (arthrogram) (11/18/2019); Joint replacement; and Knee surgery (Left, 2021)  ,  _______________________________________________________________________  Family History:  family history includes Diabetes in her father and sister; Polymyalgia rheumatica in her mother ,  _______________________________________________________________________  Social History:   reports that she quit smoking about 40 years ago  Her smoking use included cigarettes  She has never used smokeless tobacco  She reports current alcohol use of about 1 0 standard drink of alcohol per week  She reports that she does not use drugs  ,  _______________________________________________________________________  Allergies:  has No Known Allergies     _______________________________________________________________________  Current Outpatient Medications   Medication Sig Dispense Refill    Ascorbic Acid (VITAMIN C) 1000 MG tablet Take 1,500 mg by mouth 2 (two) times a day      clobetasol (TEMOVATE) 0 05 % ointment Apply topically 2 (two) times a day 30 g 0    diclofenac (VOLTAREN) 75 mg EC tablet Take 1 tablet (75 mg total) by mouth 2 (two) times a day 60 tablet 2    levothyroxine 75 mcg tablet       mirtazapine (REMERON) 15 mg tablet       multivitamin (THERAGRAN) TABS Take 1 tablet by mouth daily      NON FORMULARY wobenzyme       No current facility-administered medications for this visit      _______________________________________________________________________  Review of Systems   Constitutional: Positive for fatigue  Negative for chills, diaphoresis and fever  HENT: Positive for congestion, postnasal drip, rhinorrhea and tinnitus  Negative for ear pain, sinus pressure, sinus pain, sneezing and sore throat  Eyes: Positive for itching  Negative for redness  Respiratory: Positive for cough (When outside, 13 year history of smoking 1 5 packs a day, quit 1980)  Negative for shortness of breath and wheezing  Cardiovascular: Negative for chest pain and palpitations  Gastrointestinal: Negative for abdominal pain, constipation, diarrhea, nausea and vomiting  Genitourinary: Positive for frequency and urgency  Negative for dysuria  Getting much worse with age  Musculoskeletal: Positive for arthralgias and joint swelling  Right great toe, right hips, bilateral hands, bilateral knees  Skin: Negative for rash  Neurological: Negative for dizziness, light-headedness, numbness and headaches  Psychiatric/Behavioral: Positive for agitation and dysphoric mood  Negative for sleep disturbance  Suicidal ideas: 6-8 hours of sleep a night  Objective:  Vitals:    06/15/22 0857   BP: 112/70   Pulse: 64   Temp: (!) 97 4 °F (36 3 °C)   SpO2: 95%   Weight: 73 5 kg (162 lb)   Height: 5' 5 5" (1 664 m)     Body mass index is 26 55 kg/m²  Physical Exam  Vitals and nursing note reviewed  Constitutional:       Appearance: Normal appearance  She is not ill-appearing or diaphoretic  HENT:      Right Ear: Tympanic membrane, ear canal and external ear normal  There is no impacted cerumen  Left Ear: Tympanic membrane, ear canal and external ear normal  There is no impacted cerumen  Nose: Nose normal  No congestion  Mouth/Throat:      Mouth: Mucous membranes are moist       Pharynx: No posterior oropharyngeal erythema     Eyes: Extraocular Movements: Extraocular movements intact  Cardiovascular:      Rate and Rhythm: Normal rate and regular rhythm  Heart sounds: Normal heart sounds  No murmur heard  Pulmonary:      Effort: Pulmonary effort is normal       Breath sounds: Normal breath sounds  No wheezing  Abdominal:      Palpations: Abdomen is soft  Tenderness: There is no abdominal tenderness  Musculoskeletal:         General: Normal range of motion  Cervical back: Normal range of motion  Right lower leg: No edema  Left lower leg: No edema  Comments: Pain with ROM of right hip  Skin:     General: Skin is warm and dry  Neurological:      General: No focal deficit present  Mental Status: She is alert     Psychiatric:         Mood and Affect: Mood normal          Behavior: Behavior normal

## 2022-06-15 NOTE — ASSESSMENT & PLAN NOTE
3 or 4 surgeries in the past  Referral to dermatology to assess previous site of basal cell that is now itchy  Patient would also like a few moles evaluated

## 2022-06-15 NOTE — ASSESSMENT & PLAN NOTE
Continue mirtazapine  Will continue to follow  Patient feels that her melancholy comes and goes and has slowly reduced her dose of mirtazapine herself

## 2022-06-15 NOTE — TELEPHONE ENCOUNTER
Vernadine Ormond said you discussed her seeing an ENT but there was no referral for it  She would like one and name of an ENT doctor

## 2022-06-15 NOTE — ASSESSMENT & PLAN NOTE
Patient has had multiple surgeries to her teeth in the past due to an accident that she had when she was 13, currently stable

## 2022-06-15 NOTE — ASSESSMENT & PLAN NOTE
13 year history of heavy smoking, has what she feels is a lot of mucous and a smokers cough  Have chest xray, will call with results

## 2022-06-15 NOTE — ASSESSMENT & PLAN NOTE
Suggest reducing coffee, carbonated drinks, limiting drinking 2 hours before bed  Discussed pelvic floor PT and proper emptying  Will re evaluate at next visit

## 2022-06-17 ENCOUNTER — HOSPITAL ENCOUNTER (OUTPATIENT)
Dept: RADIOLOGY | Facility: HOSPITAL | Age: 77
Discharge: HOME | End: 2022-06-17
Payer: COMMERCIAL

## 2022-06-17 ENCOUNTER — APPOINTMENT (OUTPATIENT)
Dept: LAB | Facility: HOSPITAL | Age: 77
End: 2022-06-17
Payer: COMMERCIAL

## 2022-06-17 DIAGNOSIS — M79.674 GREAT TOE PAIN, RIGHT: ICD-10-CM

## 2022-06-17 DIAGNOSIS — R53.83 FATIGUE, UNSPECIFIED TYPE: ICD-10-CM

## 2022-06-17 DIAGNOSIS — R05.9 COUGH: ICD-10-CM

## 2022-06-17 DIAGNOSIS — M79.642 PAIN IN BOTH HANDS: ICD-10-CM

## 2022-06-17 DIAGNOSIS — M25.551 RIGHT HIP PAIN: ICD-10-CM

## 2022-06-17 DIAGNOSIS — Z13.6 SCREENING FOR CARDIOVASCULAR CONDITION: ICD-10-CM

## 2022-06-17 DIAGNOSIS — M79.641 PAIN IN BOTH HANDS: ICD-10-CM

## 2022-06-17 LAB
25(OH)D3 SERPL-MCNC: 34.8 NG/ML (ref 30–100)
ALBUMIN SERPL BCP-MCNC: 4.1 G/DL (ref 3.5–5)
ALP SERPL-CCNC: 84 U/L (ref 46–116)
ALT SERPL W P-5'-P-CCNC: 46 U/L (ref 12–78)
ANION GAP SERPL CALCULATED.3IONS-SCNC: 10 MMOL/L (ref 4–13)
AST SERPL W P-5'-P-CCNC: 30 U/L (ref 5–45)
BASOPHILS # BLD AUTO: 0.04 THOUSANDS/ΜL (ref 0–0.1)
BASOPHILS NFR BLD AUTO: 1 % (ref 0–1)
BILIRUB SERPL-MCNC: 0.51 MG/DL (ref 0.2–1)
BUN SERPL-MCNC: 15 MG/DL (ref 5–25)
CALCIUM SERPL-MCNC: 9 MG/DL (ref 8.3–10.1)
CHLORIDE SERPL-SCNC: 104 MMOL/L (ref 100–108)
CHOLEST SERPL-MCNC: 206 MG/DL
CO2 SERPL-SCNC: 27 MMOL/L (ref 21–32)
CREAT SERPL-MCNC: 0.8 MG/DL (ref 0.6–1.3)
CRP SERPL QL: 8.7 MG/L
EOSINOPHIL # BLD AUTO: 0.29 THOUSAND/ΜL (ref 0–0.61)
EOSINOPHIL NFR BLD AUTO: 6 % (ref 0–6)
ERYTHROCYTE [DISTWIDTH] IN BLOOD BY AUTOMATED COUNT: 12.7 % (ref 11.6–15.1)
GFR SERPL CREATININE-BSD FRML MDRD: 72 ML/MIN/1.73SQ M
GLUCOSE P FAST SERPL-MCNC: 99 MG/DL (ref 65–99)
HCT VFR BLD AUTO: 47.1 % (ref 34.8–46.1)
HDLC SERPL-MCNC: 71 MG/DL
HGB BLD-MCNC: 15.1 G/DL (ref 11.5–15.4)
IMM GRANULOCYTES # BLD AUTO: 0.01 THOUSAND/UL (ref 0–0.2)
IMM GRANULOCYTES NFR BLD AUTO: 0 % (ref 0–2)
LDLC SERPL CALC-MCNC: 119 MG/DL (ref 0–100)
LYMPHOCYTES # BLD AUTO: 1.52 THOUSANDS/ΜL (ref 0.6–4.47)
LYMPHOCYTES NFR BLD AUTO: 30 % (ref 14–44)
MCH RBC QN AUTO: 30.4 PG (ref 26.8–34.3)
MCHC RBC AUTO-ENTMCNC: 32.1 G/DL (ref 31.4–37.4)
MCV RBC AUTO: 95 FL (ref 82–98)
MONOCYTES # BLD AUTO: 0.5 THOUSAND/ΜL (ref 0.17–1.22)
MONOCYTES NFR BLD AUTO: 10 % (ref 4–12)
NEUTROPHILS # BLD AUTO: 2.71 THOUSANDS/ΜL (ref 1.85–7.62)
NEUTS SEG NFR BLD AUTO: 53 % (ref 43–75)
NONHDLC SERPL-MCNC: 135 MG/DL
NRBC BLD AUTO-RTO: 0 /100 WBCS
PLATELET # BLD AUTO: 217 THOUSANDS/UL (ref 149–390)
PMV BLD AUTO: 10.1 FL (ref 8.9–12.7)
POTASSIUM SERPL-SCNC: 4.5 MMOL/L (ref 3.5–5.3)
PROT SERPL-MCNC: 7.5 G/DL (ref 6.4–8.2)
RBC # BLD AUTO: 4.97 MILLION/UL (ref 3.81–5.12)
SODIUM SERPL-SCNC: 141 MMOL/L (ref 136–145)
TRIGL SERPL-MCNC: 81 MG/DL
TSH SERPL DL<=0.05 MIU/L-ACNC: 4.13 UIU/ML (ref 0.45–4.5)
WBC # BLD AUTO: 5.07 THOUSAND/UL (ref 4.31–10.16)

## 2022-06-17 PROCEDURE — 82306 VITAMIN D 25 HYDROXY: CPT

## 2022-06-17 PROCEDURE — 84443 ASSAY THYROID STIM HORMONE: CPT

## 2022-06-17 PROCEDURE — 86618 LYME DISEASE ANTIBODY: CPT

## 2022-06-17 PROCEDURE — 85025 COMPLETE CBC W/AUTO DIFF WBC: CPT

## 2022-06-17 PROCEDURE — 71046 X-RAY EXAM CHEST 2 VIEWS: CPT

## 2022-06-17 PROCEDURE — 86140 C-REACTIVE PROTEIN: CPT

## 2022-06-17 PROCEDURE — 80053 COMPREHEN METABOLIC PANEL: CPT

## 2022-06-17 PROCEDURE — 73630 X-RAY EXAM OF FOOT: CPT

## 2022-06-17 PROCEDURE — 36415 COLL VENOUS BLD VENIPUNCTURE: CPT

## 2022-06-17 PROCEDURE — 80061 LIPID PANEL: CPT

## 2022-06-17 PROCEDURE — 86038 ANTINUCLEAR ANTIBODIES: CPT

## 2022-06-17 PROCEDURE — 86430 RHEUMATOID FACTOR TEST QUAL: CPT

## 2022-06-18 LAB — B BURGDOR IGG+IGM SER-ACNC: 0.2 AI

## 2022-06-20 ENCOUNTER — TELEPHONE (OUTPATIENT)
Dept: FAMILY MEDICINE CLINIC | Facility: CLINIC | Age: 77
End: 2022-06-20

## 2022-06-20 LAB
RHEUMATOID FACT SER QL LA: NEGATIVE
RYE IGE QN: NEGATIVE

## 2022-06-20 NOTE — TELEPHONE ENCOUNTER
----- Message from Rosetta Winkler, [address] sent at 6/19/2022  9:08 PM EDT -----  Can you please let her know that her xray looked good and ask how her symptoms are

## 2022-06-21 ENCOUNTER — RA CDI HCC (OUTPATIENT)
Dept: OTHER | Facility: HOSPITAL | Age: 77
End: 2022-06-21

## 2022-06-21 NOTE — PROGRESS NOTES
[facility]  coding opportunities       Chart reviewed, no opportunity found:   [address]        Patients Insurance     Medicare Insurance: [employer]

## 2022-06-22 ENCOUNTER — APPOINTMENT (OUTPATIENT)
Dept: LAB | Facility: HOSPITAL | Age: 77
End: 2022-06-22
Payer: COMMERCIAL

## 2022-06-22 ENCOUNTER — OFFICE VISIT (OUTPATIENT)
Dept: FAMILY MEDICINE CLINIC | Facility: CLINIC | Age: 77
End: 2022-06-22
Payer: COMMERCIAL

## 2022-06-22 ENCOUNTER — TELEPHONE (OUTPATIENT)
Dept: FAMILY MEDICINE CLINIC | Facility: CLINIC | Age: 77
End: 2022-06-22

## 2022-06-22 VITALS
OXYGEN SATURATION: 97 % | HEIGHT: 66 IN | HEART RATE: 62 BPM | BODY MASS INDEX: 26.55 KG/M2 | SYSTOLIC BLOOD PRESSURE: 122 MMHG | DIASTOLIC BLOOD PRESSURE: 76 MMHG

## 2022-06-22 DIAGNOSIS — M25.551 RIGHT HIP PAIN: Primary | ICD-10-CM

## 2022-06-22 DIAGNOSIS — M79.641 PAIN IN BOTH HANDS: ICD-10-CM

## 2022-06-22 DIAGNOSIS — M79.642 PAIN IN BOTH HANDS: ICD-10-CM

## 2022-06-22 DIAGNOSIS — M79.674 GREAT TOE PAIN, RIGHT: ICD-10-CM

## 2022-06-22 DIAGNOSIS — C44.310 BASAL CELL CARCINOMA (BCC) OF SKIN OF FACE, UNSPECIFIED PART OF FACE: ICD-10-CM

## 2022-06-22 DIAGNOSIS — M25.551 RIGHT HIP PAIN: ICD-10-CM

## 2022-06-22 DIAGNOSIS — H93.11 TINNITUS OF RIGHT EAR: ICD-10-CM

## 2022-06-22 LAB
FOLATE SERPL-MCNC: >20 NG/ML (ref 3.1–17.5)
VIT B12 SERPL-MCNC: 521 PG/ML (ref 100–900)

## 2022-06-22 PROCEDURE — 3288F FALL RISK ASSESSMENT DOCD: CPT

## 2022-06-22 PROCEDURE — 82607 VITAMIN B-12: CPT

## 2022-06-22 PROCEDURE — 3725F SCREEN DEPRESSION PERFORMED: CPT

## 2022-06-22 PROCEDURE — 99214 OFFICE O/P EST MOD 30 MIN: CPT

## 2022-06-22 PROCEDURE — 1036F TOBACCO NON-USER: CPT

## 2022-06-22 PROCEDURE — 36415 COLL VENOUS BLD VENIPUNCTURE: CPT

## 2022-06-22 PROCEDURE — 1160F RVW MEDS BY RX/DR IN RCRD: CPT

## 2022-06-22 PROCEDURE — 82746 ASSAY OF FOLIC ACID SERUM: CPT

## 2022-06-22 PROCEDURE — 1090F PRES/ABSN URINE INCON ASSESS: CPT

## 2022-06-22 NOTE — PROGRESS NOTES
Assessment and Plan:     Problem List Items Addressed This Visit    None          Preventive health issues were discussed with patient, and age appropriate screening tests were ordered as noted in patient's After Visit Summary  Personalized health advice and appropriate referrals for health education or preventive services given if needed, as noted in patient's After Visit Summary  History of Present Illness:     Patient presents for a Medicare Wellness Visit    HPI   Patient Care Team:  Marianne Cook as PCP - General (Nurse Practitioner)     Review of Systems:     Review of Systems     Problem List:     Patient Active Problem List   Diagnosis    Animal bite    Tinnitus of right ear    Great toe pain, right    Recurrent major depressive disorder, in partial remission ([facility] )    Pain in both hands    Basal cell carcinoma (BCC) of skin of face    Urinary frequency    Right hip pain    Poor dentition    Cough    Allergies    Screening for cardiovascular condition    Fatigue    Hypothyroidism      Past Medical and Surgical History:     Past Medical History:   Diagnosis Date    Disease of thyroid gland      Past Surgical History:   Procedure Laterality Date    FL INJECTION RIGHT SHOULDER (ARTHROGRAM)  2019    JOINT REPLACEMENT      KNEE SURGERY Left     SHOULDER SURGERY Right     orif      Family History:     Family History   Problem Relation Age of Onset    Polymyalgia rheumatica Mother     Diabetes Father     Diabetes Sister       Social History:     Social History     Socioeconomic History    Marital status: Single     Spouse name: None    Number of children: None    Years of education: None    Highest education level: None   Occupational History    None   Tobacco Use    Smoking status: Former Smoker     Types: Cigarettes     Quit date:      Years since quittin 4    Smokeless tobacco: Never Used   Substance and Sexual Activity    Alcohol use:  Yes Alcohol/week: 1 0 standard drink     Types: 1 Glasses of wine per week    Drug use: Never    Sexual activity: None   Other Topics Concern    None   Social History Narrative    None     Social Determinants of Health     Financial Resource Strain: Not on file   Food Insecurity: Not on file   Transportation Needs: Not on file   Physical Activity: Not on file   Stress: Not on file   Social Connections: Not on file   Intimate Partner Violence: Not on file   Housing Stability: Not on file      Medications and Allergies:     Current Outpatient Medications   Medication Sig Dispense Refill    Ascorbic Acid (VITAMIN C) 1000 MG tablet Take 1,500 mg by mouth 2 (two) times a day      clobetasol (TEMOVATE) 0 05 % ointment Apply topically 2 (two) times a day 30 g 0    diclofenac (VOLTAREN) 75 mg EC tablet Take 1 tablet (75 mg total) by mouth 2 (two) times a day 60 tablet 2    levothyroxine 75 mcg tablet       mirtazapine (REMERON) 15 mg tablet       multivitamin (THERAGRAN) TABS Take 1 tablet by mouth daily      NON FORMULARY wobenzyme       No current facility-administered medications for this visit  No Known Allergies   Immunizations:     Immunization History   Administered Date(s) Administered    Influenza, high dose seasonal 0 7 mL 10/10/2012, 11/18/2013, 10/29/2014, 10/21/2015, 12/05/2016    Pneumococcal Conjugate 13-Valent 02/06/2017    Pneumococcal Polysaccharide PPV23 01/23/2018    Tdap 09/10/2021    influenza, trivalent, adjuvanted 10/18/2019      Health Maintenance:         Topic Date Due    Hepatitis C Screening  Never done    Breast Cancer Screening: Mammogram  Never done    Colorectal Cancer Screening  Never done         Topic Date Due    COVID-19 Vaccine (1) Never done    Influenza Vaccine (Season Ended) 09/01/2022      Medicare Screening Tests and Risk Assessments:     Katie Gonzalez is here for her Subsequent Wellness visit  Depression Screening:   PHQ-9 Score: 5      Fall Risk Screening:    In the past year, patient has experienced: no history of falling in past year      Urinary Incontinence Screening:   Patient has not leaked urine accidently in the last six months  Home Safety:  Patient does not have trouble with stairs inside or outside of their home  Patient has working smoke alarms and has working carbon monoxide detector  Home safety hazards include: none  PREVENTIVE SCREENINGS      Cardiovascular Screening:    General: Screening Current      Diabetes Screening:     General: Screening Current      Cervical Cancer Screening:    General: Screening Not Indicated      Lung Cancer Screening:     General: Screening Not Indicated    Screening, Brief Intervention, and Referral to Treatment (SBIRT)    Screening  Typical number of drinks in a day: 0  Typical number of drinks in a week: 0  Interpretation: Low risk drinking behavior  Single Item Drug Screening:  How often have you used an illegal drug (including marijuana) or a prescription medication for non-medical reasons in the past year? never    Single Item Drug Screen Score: 0  Interpretation: Negative screen for possible drug use disorder    No exam data present     Physical Exam:     There were no vitals taken for this visit      Physical Exam     MARTIN Bridges

## 2022-06-22 NOTE — TELEPHONE ENCOUNTER
Called pt and left voicemail to give us a call back so I can give her the information for the chiropractor she would want her to see and about the acupuncture and massage therapy her mother-in-law does  I will leave her a MyChart message as well

## 2022-06-22 NOTE — Clinical Note
Can you let her know that the chiropractor is Dr Geoffrey Del Toro [phone]  My mother in law also does acupuncture and massage therapy if she is interested in their contact information just let me know   Thank you

## 2022-06-22 NOTE — ASSESSMENT & PLAN NOTE
Patient's mother did have PMR, patient is not interested in seeing rheumatology at this time  I explained that her crp is high which indicates inflammation and discussed that I think she should continue to take diclofenac in the evenings and continue with glucosamine, MSM and contrition as well as cherry and tumeric

## 2022-06-22 NOTE — ASSESSMENT & PLAN NOTE
Patient does have pain and tenderness in the snuffbox of the right hand  She is not interested in seeing orthopedics at this time

## 2022-06-22 NOTE — TELEPHONE ENCOUNTER
----- Message from Catei Shultz, [address] sent at 6/22/2022 12:29 PM EDT -----  Can you let her know that the chiropractor is Dr Mukul Ellison [phone]  My mother in law also does acupuncture and massage therapy if she is interested in their contact information just let me know   Thank you

## 2022-06-22 NOTE — PROGRESS NOTES
Assessment/Plan:         Problem List Items Addressed This Visit        Musculoskeletal and Integument    Basal cell carcinoma (BCC) of skin of face     Patient needs to make appointment with dermatology but has a lot going on right now  Other    Tinnitus of right ear     Patient needs to make appointment with ENT but feels she has a lot going on right now  Great toe pain, right     Went over xray of the foot  Patient is not interested in seeing podiatry or orthopedics at this time  Suggest continuing to take diclofenac in the evenings before bed and continuing glucosamine, MSM and chondroitin and cherry extract and tumeric  Relevant Orders    Vitamin B12    Folate    Pain in both hands     Patient does have pain and tenderness in the snuffbox of the right hand  She is not interested in seeing orthopedics at this time  Relevant Orders    Vitamin B12    Folate    Right hip pain - Primary     Patient's mother did have PMR, patient is not interested in seeing rheumatology at this time  I explained that her crp is high which indicates inflammation and discussed that I think she should continue to take diclofenac in the evenings and continue with glucosamine, MSM and contrition as well as cherry and tumeric  Relevant Orders    Vitamin B12    Folate            Subjective:      Patient ID: Ambreen Desai is a 78 y o  female  Bridgette Hoffman has started drinking tart cherry since seeing Dr Nash Coles  She takes Raw Vit C, a B complex, bone up by Angel, and chondroitin/msm/glucosamin  She hasn't gone to pt  She did have a right arm surgery of the humerus  She is having all over pain  What Nukendall Hoffman is most interested in is if she should be taking diclofenac  She is overwhelmed with her mothers home and moving out of her apartment in [place]   We discussed mammogram, osteopetrosis (osteopenia seen on recent xray) and colon screening and patient wants to wait until she has less going on in her life do do any screening exams  The following portions of the patient's history were reviewed and updated as appropriate:   Past Medical History:  She has a past medical history of Disease of thyroid gland  ,  _______________________________________________________________________  Medical Problems:  does not have any pertinent problems on file ,  _______________________________________________________________________  Past Surgical History:   has a past surgical history that includes Shoulder surgery (Right, 2012); FL injection right shoulder (arthrogram) (11/18/2019); Joint replacement; and Knee surgery (Left, 2021)  ,  _______________________________________________________________________  Family History:  family history includes Diabetes in her father and sister; Polymyalgia rheumatica in her mother ,  _______________________________________________________________________  Social History:   reports that she quit smoking about 40 years ago  Her smoking use included cigarettes  She has never used smokeless tobacco  She reports current alcohol use of about 1 0 standard drink of alcohol per week  She reports that she does not use drugs  ,  _______________________________________________________________________  Allergies:  has No Known Allergies     _______________________________________________________________________  Current Outpatient Medications   Medication Sig Dispense Refill    Ascorbic Acid (VITAMIN C) 1000 MG tablet Take 1,500 mg by mouth 2 (two) times a day      clobetasol (TEMOVATE) 0 05 % ointment Apply topically 2 (two) times a day 30 g 0    diclofenac (VOLTAREN) 75 mg EC tablet Take 1 tablet (75 mg total) by mouth 2 (two) times a day 60 tablet 2    levothyroxine 75 mcg tablet       mirtazapine (REMERON) 15 mg tablet       multivitamin (THERAGRAN) TABS Take 1 tablet by mouth daily      NON FORMULARY wobenzyme       No current facility-administered medications for this visit  _______________________________________________________________________  Review of Systems   Constitutional: Positive for fatigue  Negative for chills, diaphoresis and fever  HENT: Negative for congestion, ear pain, sinus pressure, sinus pain and sore throat  Eyes: Negative for pain and visual disturbance  Respiratory: Negative for cough, shortness of breath and wheezing  Cardiovascular: Negative for chest pain and palpitations  Gastrointestinal: Negative for abdominal pain, constipation, diarrhea, nausea and vomiting  Genitourinary: Negative for dysuria, frequency, hematuria and urgency  Musculoskeletal: Positive for arthralgias (Right great toe aching, Right hip and down her leg  Bilateral hand pain relating to gardening  left flank pain ) and myalgias  Negative for back pain  Skin: Negative for color change and rash  Neurological: Negative for dizziness, seizures, syncope and headaches  Psychiatric/Behavioral: Positive for dysphoric mood and sleep disturbance  Negative for agitation  All other systems reviewed and are negative  Objective:  Vitals:    06/22/22 0815   BP: 122/76   Pulse: 62   SpO2: 97%   Height: 5' 5 5" (1 664 m)     Body mass index is 26 55 kg/m²  Physical Exam  Vitals and nursing note reviewed  Constitutional:       Appearance: Normal appearance  She is not ill-appearing  HENT:      Right Ear: Tympanic membrane, ear canal and external ear normal  There is no impacted cerumen  Left Ear: Tympanic membrane, ear canal and external ear normal  There is no impacted cerumen  Nose: Nose normal  No congestion  Mouth/Throat:      Mouth: Mucous membranes are moist       Pharynx: No posterior oropharyngeal erythema  Eyes:      Extraocular Movements: Extraocular movements intact  Cardiovascular:      Rate and Rhythm: Normal rate and regular rhythm  Heart sounds: Normal heart sounds  No murmur heard    Pulmonary:      Effort: Pulmonary effort is normal       Breath sounds: Normal breath sounds  No wheezing  Abdominal:      Palpations: Abdomen is soft  Tenderness: There is no abdominal tenderness  Musculoskeletal:         General: Tenderness (Left snuffbox of the hand ) present  Normal range of motion  Cervical back: Normal range of motion  Skin:     General: Skin is warm and dry  Neurological:      General: No focal deficit present  Mental Status: She is alert     Psychiatric:         Mood and Affect: Mood normal          Behavior: Behavior normal

## 2022-06-22 NOTE — ASSESSMENT & PLAN NOTE
Went over xray of the foot  Patient is not interested in seeing podiatry or orthopedics at this time  Suggest continuing to take diclofenac in the evenings before bed and continuing glucosamine, MSM and chondroitin and cherry extract and tumeric

## 2022-06-22 NOTE — PATIENT INSTRUCTIONS
Medicare Preventive Visit Patient Instructions  Thank you for completing your Welcome to Medicare Visit or Medicare Annual Wellness Visit today  Your next wellness visit will be due in one year (6/23/2023)  The screening/preventive services that you may require over the next 5-10 years are detailed below  Some tests may not apply to you based off risk factors and/or age  Screening tests ordered at today's visit but not completed yet may show as past due  Also, please note that scanned in results may not display below  Preventive Screenings:  Service Recommendations Previous Testing/Comments   Colorectal Cancer Screening  * Colonoscopy    * Fecal Occult Blood Test (FOBT)/Fecal Immunochemical Test (FIT)  * Fecal DNA/Cologuard Test  * Flexible Sigmoidoscopy Age: 54-65 years old   Colonoscopy: every 10 years (may be performed more frequently if at higher risk)  OR  FOBT/FIT: every 1 year  OR  Cologuard: every 3 years  OR  Sigmoidoscopy: every 5 years  Screening may be recommended earlier than age 48 if at higher risk for colorectal cancer  Also, an individualized decision between you and your healthcare provider will decide whether screening between the ages of 74-80 would be appropriate  Colonoscopy: 03/24/2020  FOBT/FIT: Not on file  Cologuard: Not on file  Sigmoidoscopy: Not on file          Breast Cancer Screening Age: 36 years old  Frequency: every 1-2 years  Not required if history of left and right mastectomy Mammogram: Not on file        Cervical Cancer Screening Between the ages of 21-29, pap smear recommended once every 3 years  Between the ages of 33-67, can perform pap smear with HPV co-testing every 5 years     Recommendations may differ for women with a history of total hysterectomy, cervical cancer, or abnormal pap smears in past  Pap Smear: Not on file    Screening Not Indicated   Hepatitis C Screening Once for adults born between [facility]  More frequently in patients at high risk for Hepatitis C Hep C Antibody: Not on file        Diabetes Screening 1-2 times per year if you're at risk for diabetes or have pre-diabetes Fasting glucose: 99 mg/dL   A1C: No results in last 5 years    Screening Current   Cholesterol Screening Once every 5 years if you don't have a lipid disorder  May order more often based on risk factors  Lipid panel: 06/17/2022    Screening Current     Other Preventive Screenings Covered by Medicare:  1  Abdominal Aortic Aneurysm (AAA) Screening: covered once if your at risk  You're considered to be at risk if you have a family history of AAA  2  Lung Cancer Screening: covers low dose CT scan once per year if you meet all of the following conditions: (1) Age 50-69; (2) No signs or symptoms of lung cancer; (3) Current smoker or have quit smoking within the last 15 years; (4) You have a tobacco smoking history of at least 30 pack years (packs per day multiplied by number of years you smoked); (5) You get a written order from a healthcare provider  3  Glaucoma Screening: covered annually if you're considered high risk: (1) You have diabetes OR (2) Family history of glaucoma OR (3)  aged 48 and older OR (3)  American aged 72 and older  3  Osteoporosis Screening: covered every 2 years if you meet one of the following conditions: (1) You're estrogen deficient and at risk for osteoporosis based off medical history and other findings; (2) Have a vertebral abnormality; (3) On glucocorticoid therapy for more than 3 months; (4) Have primary hyperparathyroidism; (5) On osteoporosis medications and need to assess response to drug therapy  · Last bone density test (DXA Scan): Not on file  5  HIV Screening: covered annually if you're between the age of 12-76  Also covered annually if you are younger than 13 and older than 72 with risk factors for HIV infection  For pregnant patients, it is covered up to 3 times per pregnancy      Immunizations:  Immunization Recommendations   Influenza Vaccine Annual influenza vaccination during flu season is recommended for all persons aged >= 6 months who do not have contraindications   Pneumococcal Vaccine (Prevnar and Pneumovax)  * Prevnar = PCV13  * Pneumovax = PPSV23   Adults 25-60 years old: 1-3 doses may be recommended based on certain risk factors  Adults 72 years old: Prevnar (PCV13) vaccine recommended followed by Pneumovax (PPSV23) vaccine  If already received PPSV23 since turning 65, then PCV13 recommended at least one year after PPSV23 dose  Hepatitis B Vaccine 3 dose series if at intermediate or high risk (ex: diabetes, end stage renal disease, liver disease)   Tetanus (Td) Vaccine - COST NOT COVERED BY MEDICARE PART B Following completion of primary series, a booster dose should be given every 10 years to maintain immunity against tetanus  Td may also be given as tetanus wound prophylaxis  Tdap Vaccine - COST NOT COVERED BY MEDICARE PART B Recommended at least once for all adults  For pregnant patients, recommended with each pregnancy  Shingles Vaccine (Shingrix) - COST NOT COVERED BY MEDICARE PART B  2 shot series recommended in those aged 48 and above     Health Maintenance Due:      Topic Date Due    Hepatitis C Screening  Never done    Breast Cancer Screening: Mammogram  Never done    Colorectal Cancer Screening  Never done     Immunizations Due:      Topic Date Due    COVID-19 Vaccine (1) Never done    Influenza Vaccine (Season Ended) 09/01/2022     Advance Directives   What are advance directives? Advance directives are legal documents that state your wishes and plans for medical care  These plans are made ahead of time in case you lose your ability to make decisions for yourself  Advance directives can apply to any medical decision, such as the treatments you want, and if you want to donate organs  What are the types of advance directives?   There are many types of advance directives, and each state has rules about how to use them  You may choose a combination of any of the following:  · Living will: This is a written record of the treatment you want  You can also choose which treatments you do not want, which to limit, and which to stop at a certain time  This includes surgery, medicine, IV fluid, and tube feedings  · Durable power of  for healthcare [place] [facility]): This is a written record that states who you want to make healthcare choices for you when you are unable to make them for yourself  This person, called a proxy, is usually a family member or a friend  You may choose more than 1 proxy  · Do not resuscitate (DNR) order:  A DNR order is used in case your heart stops beating or you stop breathing  It is a request not to have certain forms of treatment, such as CPR  A DNR order may be included in other types of advance directives  · Medical directive: This covers the care that you want if you are in a coma, near death, or unable to make decisions for yourself  You can list the treatments you want for each condition  Treatment may include pain medicine, surgery, blood transfusions, dialysis, IV or tube feedings, and a ventilator (breathing machine)  · Values history: This document has questions about your views, beliefs, and how you feel and think about life  This information can help others choose the care that you would choose  Why are advance directives important? An advance directive helps you control your care  Although spoken wishes may be used, it is better to have your wishes written down  Spoken wishes can be misunderstood, or not followed  Treatments may be given even if you do not want them  An advance directive may make it easier for your family to make difficult choices about your care     Weight Management   Why it is important to manage your weight:  Being overweight increases your risk of health conditions such as heart disease, high blood pressure, type 2 diabetes, and certain types of cancer  It can also increase your risk for osteoarthritis, sleep apnea, and other respiratory problems  Aim for a slow, steady weight loss  Even a small amount of weight loss can lower your risk of health problems  How to lose weight safely:  A safe and healthy way to lose weight is to eat fewer calories and get regular exercise  You can lose up about 1 pound a week by decreasing the number of calories you eat by 500 calories each day  Healthy meal plan for weight management:  A healthy meal plan includes a variety of foods, contains fewer calories, and helps you stay healthy  A healthy meal plan includes the following:  · Eat whole-grain foods more often  A healthy meal plan should contain fiber  Fiber is the part of grains, fruits, and vegetables that is not broken down by your body  Whole-grain foods are healthy and provide extra fiber in your diet  Some examples of whole-grain foods are whole-wheat breads and pastas, oatmeal, brown rice, and bulgur  · Eat a variety of vegetables every day  Include dark, leafy greens such as spinach, kale, babak greens, and mustard greens  Eat yellow and orange vegetables such as carrots, sweet potatoes, and winter squash  · Eat a variety of fruits every day  Choose fresh or canned fruit (canned in its own juice or light syrup) instead of juice  Fruit juice has very little or no fiber  · Eat low-fat dairy foods  Drink fat-free (skim) milk or 1% milk  Eat fat-free yogurt and low-fat cottage cheese  Try low-fat cheeses such as mozzarella and other reduced-fat cheeses  · Choose meat and other protein foods that are low in fat  Choose beans or other legumes such as split peas or lentils  Choose fish, skinless poultry (chicken or turkey), or lean cuts of red meat (beef or pork)  Before you cook meat or poultry, cut off any visible fat  · Use less fat and oil  Try baking foods instead of frying them   Add less fat, such as margarine, sour cream, regular salad dressing and mayonnaise to foods  Eat fewer high-fat foods  Some examples of high-fat foods include french fries, doughnuts, ice cream, and cakes  · Eat fewer sweets  Limit foods and drinks that are high in sugar  This includes candy, cookies, regular soda, and sweetened drinks  Exercise:  Exercise at least 30 minutes per day on most days of the week  Some examples of exercise include walking, biking, dancing, and swimming  You can also fit in more physical activity by taking the stairs instead of the elevator or parking farther away from stores  Ask your healthcare provider about the best exercise plan for you  © Copyright [employer] 2018 Information is for End User's use only and may not be sold, redistributed or otherwise used for commercial purposes  All illustrations and images included in CareNotes® are the copyrighted property of [employer]  or [facility] Preventive Visit Patient Instructions  Thank you for completing your Welcome to Medicare Visit or Medicare Annual Wellness Visit today  Your next wellness visit will be due in one year (6/23/2023)  The screening/preventive services that you may require over the next 5-10 years are detailed below  Some tests may not apply to you based off risk factors and/or age  Screening tests ordered at today's visit but not completed yet may show as past due  Also, please note that scanned in results may not display below    Preventive Screenings:  Service Recommendations Previous Testing/Comments   Colorectal Cancer Screening  * Colonoscopy    * Fecal Occult Blood Test (FOBT)/Fecal Immunochemical Test (FIT)  * Fecal DNA/Cologuard Test  * Flexible Sigmoidoscopy Age: 54-65 years old   Colonoscopy: every 10 years (may be performed more frequently if at higher risk)  OR  FOBT/FIT: every 1 year  OR  Cologuard: every 3 years  OR  Sigmoidoscopy: every 5 years  Screening may be recommended earlier than age 48 if at higher risk for colorectal cancer  Also, an individualized decision between you and your healthcare provider will decide whether screening between the ages of 74-80 would be appropriate  Colonoscopy: 03/24/2020  FOBT/FIT: Not on file  Cologuard: Not on file  Sigmoidoscopy: Not on file          Breast Cancer Screening Age: 36 years old  Frequency: every 1-2 years  Not required if history of left and right mastectomy Mammogram: Not on file        Cervical Cancer Screening Between the ages of 21-29, pap smear recommended once every 3 years  Between the ages of 33-67, can perform pap smear with HPV co-testing every 5 years  Recommendations may differ for women with a history of total hysterectomy, cervical cancer, or abnormal pap smears in past  Pap Smear: Not on file    Screening Not Indicated   Hepatitis C Screening Once for adults born between 1945 and 1965  More frequently in patients at high risk for Hepatitis C Hep C Antibody: Not on file        Diabetes Screening 1-2 times per year if you're at risk for diabetes or have pre-diabetes Fasting glucose: 99 mg/dL   A1C: No results in last 5 years    Screening Current   Cholesterol Screening Once every 5 years if you don't have a lipid disorder  May order more often based on risk factors  Lipid panel: 06/17/2022    Screening Current     Other Preventive Screenings Covered by Medicare:  6  Abdominal Aortic Aneurysm (AAA) Screening: covered once if your at risk  You're considered to be at risk if you have a family history of AAA  7  Lung Cancer Screening: covers low dose CT scan once per year if you meet all of the following conditions: (1) Age 50-69; (2) No signs or symptoms of lung cancer; (3) Current smoker or have quit smoking within the last 15 years; (4) You have a tobacco smoking history of at least 30 pack years (packs per day multiplied by number of years you smoked); (5) You get a written order from a healthcare provider    8  Glaucoma Screening: covered annually if you're considered high risk: (1) You have diabetes OR (2) Family history of glaucoma OR (3)  aged 48 and older OR (4)  American aged 72 and older  5  Osteoporosis Screening: covered every 2 years if you meet one of the following conditions: (1) You're estrogen deficient and at risk for osteoporosis based off medical history and other findings; (2) Have a vertebral abnormality; (3) On glucocorticoid therapy for more than 3 months; (4) Have primary hyperparathyroidism; (5) On osteoporosis medications and need to assess response to drug therapy  · Last bone density test (DXA Scan): Not on file  10  HIV Screening: covered annually if you're between the age of 12-76  Also covered annually if you are younger than 13 and older than 72 with risk factors for HIV infection  For pregnant patients, it is covered up to 3 times per pregnancy  Immunizations:  Immunization Recommendations   Influenza Vaccine Annual influenza vaccination during flu season is recommended for all persons aged >= 6 months who do not have contraindications   Pneumococcal Vaccine (Prevnar and Pneumovax)  * Prevnar = PCV13  * Pneumovax = PPSV23   Adults 25-60 years old: 1-3 doses may be recommended based on certain risk factors  Adults 72 years old: Prevnar (PCV13) vaccine recommended followed by Pneumovax (PPSV23) vaccine  If already received PPSV23 since turning 65, then PCV13 recommended at least one year after PPSV23 dose  Hepatitis B Vaccine 3 dose series if at intermediate or high risk (ex: diabetes, end stage renal disease, liver disease)   Tetanus (Td) Vaccine - COST NOT COVERED BY MEDICARE PART B Following completion of primary series, a booster dose should be given every 10 years to maintain immunity against tetanus  Td may also be given as tetanus wound prophylaxis  Tdap Vaccine - COST NOT COVERED BY MEDICARE PART B Recommended at least once for all adults  For pregnant patients, recommended with each pregnancy  Shingles Vaccine (Shingrix) - COST NOT COVERED BY MEDICARE PART B  2 shot series recommended in those aged 48 and above     Health Maintenance Due:      Topic Date Due    Hepatitis C Screening  Never done    Breast Cancer Screening: Mammogram  Never done    Colorectal Cancer Screening  Never done     Immunizations Due:      Topic Date Due    COVID-19 Vaccine (1) Never done    Influenza Vaccine (Season Ended) 09/01/2022     Advance Directives   What are advance directives? Advance directives are legal documents that state your wishes and plans for medical care  These plans are made ahead of time in case you lose your ability to make decisions for yourself  Advance directives can apply to any medical decision, such as the treatments you want, and if you want to donate organs  What are the types of advance directives? There are many types of advance directives, and each state has rules about how to use them  You may choose a combination of any of the following:  · Living will: This is a written record of the treatment you want  You can also choose which treatments you do not want, which to limit, and which to stop at a certain time  This includes surgery, medicine, IV fluid, and tube feedings  · Durable power of  for healthcare [place] [facility]): This is a written record that states who you want to make healthcare choices for you when you are unable to make them for yourself  This person, called a proxy, is usually a family member or a friend  You may choose more than 1 proxy  · Do not resuscitate (DNR) order:  A DNR order is used in case your heart stops beating or you stop breathing  It is a request not to have certain forms of treatment, such as CPR  A DNR order may be included in other types of advance directives  · Medical directive: This covers the care that you want if you are in a coma, near death, or unable to make decisions for yourself  You can list the treatments you want for each condition  Treatment may include pain medicine, surgery, blood transfusions, dialysis, IV or tube feedings, and a ventilator (breathing machine)  · Values history: This document has questions about your views, beliefs, and how you feel and think about life  This information can help others choose the care that you would choose  Why are advance directives important? An advance directive helps you control your care  Although spoken wishes may be used, it is better to have your wishes written down  Spoken wishes can be misunderstood, or not followed  Treatments may be given even if you do not want them  An advance directive may make it easier for your family to make difficult choices about your care  Weight Management   Why it is important to manage your weight:  Being overweight increases your risk of health conditions such as heart disease, high blood pressure, type 2 diabetes, and certain types of cancer  It can also increase your risk for osteoarthritis, sleep apnea, and other respiratory problems  Aim for a slow, steady weight loss  Even a small amount of weight loss can lower your risk of health problems  How to lose weight safely:  A safe and healthy way to lose weight is to eat fewer calories and get regular exercise  You can lose up about 1 pound a week by decreasing the number of calories you eat by 500 calories each day  Healthy meal plan for weight management:  A healthy meal plan includes a variety of foods, contains fewer calories, and helps you stay healthy  A healthy meal plan includes the following:  · Eat whole-grain foods more often  A healthy meal plan should contain fiber  Fiber is the part of grains, fruits, and vegetables that is not broken down by your body  Whole-grain foods are healthy and provide extra fiber in your diet  Some examples of whole-grain foods are whole-wheat breads and pastas, oatmeal, brown rice, and bulgur  · Eat a variety of vegetables every day    Include dark, leafy greens such as spinach, kale, babak greens, and mustard greens  Eat yellow and orange vegetables such as carrots, sweet potatoes, and winter squash  · Eat a variety of fruits every day  Choose fresh or canned fruit (canned in its own juice or light syrup) instead of juice  Fruit juice has very little or no fiber  · Eat low-fat dairy foods  Drink fat-free (skim) milk or 1% milk  Eat fat-free yogurt and low-fat cottage cheese  Try low-fat cheeses such as mozzarella and other reduced-fat cheeses  · Choose meat and other protein foods that are low in fat  Choose beans or other legumes such as split peas or lentils  Choose fish, skinless poultry (chicken or turkey), or lean cuts of red meat (beef or pork)  Before you cook meat or poultry, cut off any visible fat  · Use less fat and oil  Try baking foods instead of frying them  Add less fat, such as margarine, sour cream, regular salad dressing and mayonnaise to foods  Eat fewer high-fat foods  Some examples of high-fat foods include french fries, doughnuts, ice cream, and cakes  · Eat fewer sweets  Limit foods and drinks that are high in sugar  This includes candy, cookies, regular soda, and sweetened drinks  Exercise:  Exercise at least 30 minutes per day on most days of the week  Some examples of exercise include walking, biking, dancing, and swimming  You can also fit in more physical activity by taking the stairs instead of the elevator or parking farther away from stores  Ask your healthcare provider about the best exercise plan for you  © Copyright [employer] 2018 Information is for End User's use only and may not be sold, redistributed or otherwise used for commercial purposes   All illustrations and images included in CareNotes® are the copyrighted property of [employer]  or [address] [place] [address] [facility], [place]

## 2022-06-23 ENCOUNTER — TELEPHONE (OUTPATIENT)
Dept: FAMILY MEDICINE CLINIC | Facility: CLINIC | Age: 77
End: 2022-06-23

## 2022-06-23 NOTE — TELEPHONE ENCOUNTER
Called pt and was able to talk with her about her folate and B12 results  She wasn't happy about the folate being a little high was hoping for a negative result as it would have explained a few things that was going on  I let her know about the chiropractor gentleman and your mother-in-law  She said she'd go on [employer] later to take a look at the gentleman's name and number  As for your mother-in-law's information she said not right now but will communicate with you through [employer]? She did kind of rush me off the phone at the end

## 2022-06-23 NOTE — TELEPHONE ENCOUNTER
----- Message from Nicky Parra sent at 6/23/2022  9:16 AM EDT -----    ----- Message -----  From: MARTIN Roca  Sent: 6/23/2022   8:14 AM EDT  To: , #    Can we let her know that her folate and B12 levels are good  Folate is a little high but if she was taking the b complex that would be the cause and there is nothing to worry about

## 2022-07-07 DIAGNOSIS — M79.674 GREAT TOE PAIN, RIGHT: ICD-10-CM

## 2022-07-07 DIAGNOSIS — E03.9 HYPOTHYROIDISM, UNSPECIFIED TYPE: Primary | ICD-10-CM

## 2022-07-07 DIAGNOSIS — M79.642 PAIN IN BOTH HANDS: ICD-10-CM

## 2022-07-07 DIAGNOSIS — F33.41 RECURRENT MAJOR DEPRESSIVE DISORDER, IN PARTIAL REMISSION (HCC): ICD-10-CM

## 2022-07-07 DIAGNOSIS — M79.641 PAIN IN BOTH HANDS: ICD-10-CM

## 2022-07-07 RX ORDER — MIRTAZAPINE 15 MG/1
15 TABLET, FILM COATED ORAL
Qty: 90 TABLET | Refills: 1 | Status: SHIPPED | OUTPATIENT
Start: 2022-07-07 | End: 2023-01-03 | Stop reason: SDUPTHER

## 2022-07-07 RX ORDER — LEVOTHYROXINE SODIUM 0.07 MG/1
75 TABLET ORAL
Qty: 90 TABLET | Refills: 1 | Status: SHIPPED | OUTPATIENT
Start: 2022-07-07 | End: 2023-01-30

## 2022-07-22 ENCOUNTER — TELEPHONE (OUTPATIENT)
Dept: FAMILY MEDICINE CLINIC | Facility: CLINIC | Age: 77
End: 2022-07-22

## 2022-07-22 DIAGNOSIS — T14.8XXA SCRATCHES: Primary | ICD-10-CM

## 2022-07-22 NOTE — TELEPHONE ENCOUNTER
jRericchris Acosta fell with crocks on and would like to know what to do she has scraps that arent healing she using bastration and peroxid  She would like to send you a picture

## 2022-08-31 ENCOUNTER — OFFICE VISIT (OUTPATIENT)
Dept: FAMILY MEDICINE CLINIC | Facility: CLINIC | Age: 77
End: 2022-08-31
Payer: COMMERCIAL

## 2022-08-31 VITALS
DIASTOLIC BLOOD PRESSURE: 72 MMHG | HEIGHT: 66 IN | SYSTOLIC BLOOD PRESSURE: 118 MMHG | OXYGEN SATURATION: 98 % | BODY MASS INDEX: 26.55 KG/M2 | HEART RATE: 80 BPM

## 2022-08-31 DIAGNOSIS — R39.15 URINARY URGENCY: ICD-10-CM

## 2022-08-31 DIAGNOSIS — M79.641 PAIN IN BOTH HANDS: ICD-10-CM

## 2022-08-31 DIAGNOSIS — H93.13 TINNITUS OF BOTH EARS: ICD-10-CM

## 2022-08-31 DIAGNOSIS — M79.674 GREAT TOE PAIN, RIGHT: ICD-10-CM

## 2022-08-31 DIAGNOSIS — Z11.59 ENCOUNTER FOR HEPATITIS C SCREENING TEST FOR LOW RISK PATIENT: Primary | ICD-10-CM

## 2022-08-31 DIAGNOSIS — R52 BODY ACHES: ICD-10-CM

## 2022-08-31 DIAGNOSIS — H92.01 RIGHT EAR PAIN: ICD-10-CM

## 2022-08-31 DIAGNOSIS — M79.642 PAIN IN BOTH HANDS: ICD-10-CM

## 2022-08-31 DIAGNOSIS — R53.83 FATIGUE, UNSPECIFIED TYPE: ICD-10-CM

## 2022-08-31 LAB
SL AMB  POCT GLUCOSE, UA: NORMAL
SL AMB LEUKOCYTE ESTERASE,UA: NORMAL
SL AMB POCT BILIRUBIN,UA: NORMAL
SL AMB POCT BLOOD,UA: NORMAL
SL AMB POCT CLARITY,UA: CLEAR
SL AMB POCT COLOR,UA: YELLOW
SL AMB POCT KETONES,UA: NORMAL
SL AMB POCT NITRITE,UA: NORMAL
SL AMB POCT PH,UA: 7.5
SL AMB POCT SPECIFIC GRAVITY,UA: 1.01
SL AMB POCT URINE PROTEIN: NORMAL
SL AMB POCT UROBILINOGEN: NORMAL

## 2022-08-31 PROCEDURE — 69210 REMOVE IMPACTED EAR WAX UNI: CPT

## 2022-08-31 PROCEDURE — 1090F PRES/ABSN URINE INCON ASSESS: CPT

## 2022-08-31 PROCEDURE — 81002 URINALYSIS NONAUTO W/O SCOPE: CPT

## 2022-08-31 PROCEDURE — 1003F LEVEL OF ACTIVITY ASSESS: CPT

## 2022-08-31 PROCEDURE — 87086 URINE CULTURE/COLONY COUNT: CPT

## 2022-08-31 PROCEDURE — 99214 OFFICE O/P EST MOD 30 MIN: CPT

## 2022-08-31 RX ORDER — SODIUM FLUORIDE 6 MG/ML
PASTE, DENTIFRICE DENTAL
COMMUNITY
Start: 2022-07-11

## 2022-08-31 NOTE — ASSESSMENT & PLAN NOTE
Patient continues with fatigue she is not sure if it is associated with old age or something possibly going on in her endocrine system  So far testing has been negative however will refer patient to endocrine for more in-depth workup

## 2022-08-31 NOTE — PROGRESS NOTES
Assessment and Plan:     Problem List Items Addressed This Visit        Other    Tinnitus of both ears       Patient had suffered from tinnitus for years  She is now interested in seeing ENT  Second referral placed  Will continue to follow  Relevant Orders    Ambulatory Referral to Otolaryngology    Great toe pain, right       Continued to use diclofenac  I did order uric acid testing  Patient does not feel pain is related to gout has she had an injury in the right foot in the past   However I would like to rule out gout  It would be best to do an aspiration however will check uric acid in the blood for now  Have testing will call with results  Relevant Orders    Uric acid    Ambulatory Referral to Rheumatology    Pain in both hands       So far workup has been negative however blood work did show some inflammation will refer patient to Rheumatology  Fatigue       Patient continues with fatigue she is not sure if it is associated with old age or something possibly going on in her endocrine system  So far testing has been negative however will refer patient to endocrine for more in-depth workup  Relevant Orders    Ambulatory Referral to Rheumatology    Ambulatory Referral to Endocrinology    HEMOGLOBIN A1C W/ EAG ESTIMATION    Body aches       Patient's mother ended up dying from [address]  Patient does have body aches throughout her body  Referral placed to Rheumatology  We will continue to monitor  Continue diclofenac  And I still would like you to try physical therapy  Relevant Orders    Ambulatory Referral to Endocrinology    Urinary urgency       Urine dip today   To rule out acute infection  Patient requests referral to Uro guarding  Referral to urogynecologist   Patient has frequency and urgency but no burning over the past few months           Relevant Orders    Ambulatory Referral to Urogynecology      Other Visit Diagnoses     Encounter for hepatitis C screening test for low risk patient    -  Primary    accepts screening    Relevant Orders    Hepatitis C antibody    Right ear pain        Ear cleaned today and debrox suggested to keep ears free from wax  Relevant Orders    Ear cerumen removal (Completed)        BMI Counseling: Body mass index is 26 55 kg/m²  The BMI is above normal  Nutrition recommendations include decreasing portion sizes, encouraging healthy choices of fruits and vegetables, increasing intake of lean protein and reducing intake of cholesterol  Exercise recommendations include exercising 3-5 times per week  Rationale for BMI follow-up plan is due to patient being overweight or obese  Preventive health issues were discussed with patient, and age appropriate screening tests were ordered as noted in patient's After Visit Summary  Personalized health advice and appropriate referrals for health education or preventive services given if needed, as noted in patient's After Visit Summary  History of Present Illness:     Patient presents for a Medicare Wellness Visit    Jake Plummer is here for right ear pain  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 [place]  Patient Care Team:  Vincent Burns as PCP - General (Nurse Practitioner)     Review of Systems:     Review of Systems   Constitutional: Positive for fatigue  Negative for chills, diaphoresis and fever  HENT: Positive for tinnitus  Negative for congestion, ear pain, postnasal drip, sinus pressure, sinus pain and sore throat  Eyes: Negative for pain and visual disturbance  Respiratory: Negative for cough, shortness of breath and wheezing  Cardiovascular: Negative for chest pain and palpitations  Gastrointestinal: Negative for abdominal pain, constipation, diarrhea, nausea and vomiting  Genitourinary: Negative for dysuria, frequency, hematuria and urgency  Musculoskeletal: Positive for arthralgias  Negative for back pain          Right big toe pain, body pains throughout  Skin: Negative for color change and rash  Neurological: Negative for dizziness, seizures, syncope, light-headedness and headaches  Psychiatric/Behavioral: Negative for dysphoric mood and sleep disturbance  The patient is not nervous/anxious  All other systems reviewed and are negative  Problem List:     Patient Active Problem List   Diagnosis    Animal bite    Tinnitus of both ears    Great toe pain, right    Recurrent major depressive disorder, in partial remission ([address] )    Pain in both hands    Basal cell carcinoma (BCC) of skin of face    Urinary frequency    Right hip pain    Poor dentition    Cough    Allergies    Screening for cardiovascular condition    Fatigue    Hypothyroidism    Body aches    Urinary urgency      Past Medical and Surgical History:     Past Medical History:   Diagnosis Date    Disease of thyroid gland      Past Surgical History:   Procedure Laterality Date    FL INJECTION RIGHT SHOULDER (ARTHROGRAM)  2019    JOINT REPLACEMENT      KNEE SURGERY Left     SHOULDER SURGERY Right     orif      Family History:     Family History   Problem Relation Age of Onset    Polymyalgia rheumatica Mother     Diabetes Father     Diabetes Sister       Social History:     Social History     Socioeconomic History    Marital status: Single     Spouse name: None    Number of children: None    Years of education: None    Highest education level: None   Occupational History    None   Tobacco Use    Smoking status: Former Smoker     Types: Cigarettes     Quit date:      Years since quittin 6    Smokeless tobacco: Never Used   Substance and Sexual Activity    Alcohol use:  Yes     Alcohol/week: 1 0 standard drink     Types: 1 Glasses of wine per week    Drug use: Never    Sexual activity: None   Other Topics Concern    None   Social History Narrative    None     Social Determinants of Health     Financial Resource Strain: Low Risk     Difficulty of Paying Living Expenses: Not hard at all   Food Insecurity: Not on file   Transportation Needs: No Transportation Needs    Lack of Transportation (Medical): No    Lack of Transportation (Non-Medical): No   Physical Activity: Not on file   Stress: Not on file   Social Connections: Not on file   Intimate Partner Violence: Not on file   Housing Stability: Not on file      Medications and Allergies:     Current Outpatient Medications   Medication Sig Dispense Refill    Ascorbic Acid (VITAMIN C) 1000 MG tablet Take 1,500 mg by mouth 2 (two) times a day      clobetasol (TEMOVATE) 0 05 % ointment Apply topically 2 (two) times a day 30 g 0    diclofenac (VOLTAREN) 75 mg EC tablet Take 1 tablet (75 mg total) by mouth 2 (two) times a day 60 tablet 2    levothyroxine 75 mcg tablet Take 1 tablet (75 mcg total) by mouth daily in the early morning 90 tablet 1    mirtazapine (REMERON) 15 mg tablet Take 1 tablet (15 mg total) by mouth daily at bedtime 90 tablet 1    multivitamin (THERAGRAN) TABS Take 1 tablet by mouth daily      mupirocin (BACTROBAN) 2 % ointment Apply topically 3 (three) times a day 22 g 0    NON FORMULARY wobenzyme      PreviDent 5000 Booster Plus 1 1 % PSTE USE ONCE A DAY AT BEDTIME       No current facility-administered medications for this visit       No Known Allergies   Immunizations:     Immunization History   Administered Date(s) Administered    Influenza, high dose seasonal 0 7 mL 10/10/2012, 11/18/2013, 10/29/2014, 10/21/2015, 12/05/2016    Pneumococcal Conjugate 13-Valent 02/06/2017    Pneumococcal Polysaccharide PPV23 01/23/2018    Tdap 09/10/2021    influenza, trivalent, adjuvanted 10/18/2019      Health Maintenance:         Topic Date Due    Hepatitis C Screening  Never done    Breast Cancer Screening: Mammogram  Never done    Colorectal Cancer Screening  Never done         Topic Date Due    COVID-19 Vaccine (1) Never done    Influenza Vaccine (1) 09/01/2022      Medicare Screening Tests and Risk Assessments:     Chad Wasserman is here for her Subsequent Wellness visit  Last Medicare Wellness visit information reviewed, patient interviewed, no change since last AWV  Health Risk Assessment:   Patient rates overall health as good  Patient feels that their physical health rating is same  Patient is satisfied with their life  Eyesight was rated as same  Hearing was rated as same  Patient feels that their emotional and mental health rating is same  Patients states they are never, rarely angry  Patient states they are never, rarely unusually tired/fatigued  Pain experienced in the last 7 days has been none  Patient states that she has experienced no weight loss or gain in last 6 months  Depression Screening:   PHQ-9 Score: 0      Fall Risk Screening: In the past year, patient has experienced: no history of falling in past year      Urinary Incontinence Screening:   Patient has not leaked urine accidently in the last six months  Home Safety:  Patient does not have trouble with stairs inside or outside of their home  Patient has working smoke alarms and has working carbon monoxide detector  Home safety hazards include: none  Nutrition:   Current diet is Regular  Medications:   Patient is currently taking over-the-counter supplements  OTC medications include: see medication list  Patient is able to manage medications  Activities of Daily Living (ADLs)/Instrumental Activities of Daily Living (IADLs):   Walk and transfer into and out of bed and chair?: Yes  Dress and groom yourself?: Yes    Bathe or shower yourself?: Yes    Feed yourself?  Yes  Do your laundry/housekeeping?: Yes  Manage your money, pay your bills and track your expenses?: Yes  Make your own meals?: Yes    Do your own shopping?: Yes    Previous Hospitalizations:   Any hospitalizations or ED visits within the last 12 months?: No      Advance Care Planning:   Living will: No    Durable POA for healthcare: No    Advanced directive: No    Advanced directive counseling given: Yes    Five wishes given: Yes    End of Life Decisions reviewed with patient: Yes    Provider agrees with end of life decisions: Yes      Comments: If no hope for survival patient would probably not want resuscitation attempts made  Cognitive Screening:   Provider or family/friend/caregiver concerned regarding cognition?: No    PREVENTIVE SCREENINGS      Cardiovascular Screening:    General: Screening Current      Diabetes Screening:     General: Screening Current      Colorectal Cancer Screening:     General: Screening Current      Breast Cancer Screening:     General: Patient Declines      Cervical Cancer Screening:    General: Screening Not Indicated      Lung Cancer Screening:     General: Screening Not Indicated      Hepatitis C Screening:    General: Patient Declines and Risks and Benefits Discussed    Hep C Screening Accepted: Yes      Screening, Brief Intervention, and Referral to Treatment (SBIRT)    Screening  Typical number of drinks in a day: 0  Typical number of drinks in a week: 0  Interpretation: Low risk drinking behavior  Single Item Drug Screening:  How often have you used an illegal drug (including marijuana) or a prescription medication for non-medical reasons in the past year? never    Single Item Drug Screen Score: 0  Interpretation: Negative screen for possible drug use disorder    No exam data present     Physical Exam:     /72   Pulse 80   Ht 5' 5 5" (1 664 m)   SpO2 98%   BMI 26 55 kg/m²       Physical Exam  Vitals and nursing note reviewed  Constitutional:       General: She is not in acute distress  Appearance: She is well-developed  She is not ill-appearing  HENT:      Head: Normocephalic and atraumatic  Right Ear: There is impacted cerumen  Left Ear: Tympanic membrane, ear canal and external ear normal  There is no impacted cerumen        Nose: Nose normal  No congestion  Mouth/Throat:      Mouth: Mucous membranes are moist       Pharynx: No posterior oropharyngeal erythema  Eyes:      Conjunctiva/sclera: Conjunctivae normal    Cardiovascular:      Rate and Rhythm: Normal rate and regular rhythm  Heart sounds: No murmur heard  Pulmonary:      Effort: Pulmonary effort is normal  No respiratory distress  Breath sounds: Normal breath sounds  Abdominal:      Palpations: Abdomen is soft  Tenderness: There is no abdominal tenderness  Musculoskeletal:         General: No tenderness  Cervical back: Neck supple  Right lower leg: No edema  Left lower leg: No edema  Skin:     General: Skin is warm and dry  Neurological:      General: No focal deficit present  Mental Status: She is alert  Psychiatric:         Mood and Affect: Mood normal          Behavior: Behavior normal         Ear cerumen removal    Date/Time: 8/31/2022 12:26 PM  Performed by: MARTIN Adair  Authorized by: MARTIN Adair   Universal Protocol:  Consent: Verbal consent obtained  Risks and benefits: risks, benefits and alternatives were discussed  Consent given by: patient  Time out: Immediately prior to procedure a "time out" was called to verify the correct patient, procedure, equipment, support staff and site/side marked as required    Timeout called at: 8/31/2022 10:40 AM   Patient understanding: patient states understanding of the procedure being performed  Patient consent: the patient's understanding of the procedure matches consent given  Required items: required blood products, implants, devices, and special equipment available  Patient identity confirmed: verbally with patient      Patient location:  Clinic  Procedure details:     Local anesthetic:  None    Location:  R ear    Procedure type: irrigation with instrumentation      Instrumentation: loop      Approach:  External  Post-procedure details:     Complication:  None    Hearing quality:  Improved    Patient tolerance of procedure:   Tolerated well, no immediate complications      MARTIN Jay

## 2022-08-31 NOTE — ASSESSMENT & PLAN NOTE
Patient had suffered from tinnitus for years  She is now interested in seeing ENT  Second referral placed  Will continue to follow

## 2022-08-31 NOTE — ASSESSMENT & PLAN NOTE
Patient's mother ended up dying from [address]  Patient does have body aches throughout her body  Referral placed to Rheumatology  We will continue to monitor  Continue diclofenac  And I still would like you to try physical therapy

## 2022-08-31 NOTE — ASSESSMENT & PLAN NOTE
Urine dip today   To rule out acute infection  Patient requests referral to Uro guarding  Referral to urogynecologist   Patient has frequency and urgency but no burning over the past few months

## 2022-08-31 NOTE — ASSESSMENT & PLAN NOTE
So far workup has been negative however blood work did show some inflammation will refer patient to Rheumatology

## 2022-08-31 NOTE — PATIENT INSTRUCTIONS
Medicare Preventive Visit Patient Instructions  Thank you for completing your Welcome to Medicare Visit or Medicare Annual Wellness Visit today  Your next wellness visit will be due in one year (9/1/2023)  The screening/preventive services that you may require over the next 5-10 years are detailed below  Some tests may not apply to you based off risk factors and/or age  Screening tests ordered at today's visit but not completed yet may show as past due  Also, please note that scanned in results may not display below  Preventive Screenings:  Service Recommendations Previous Testing/Comments   Colorectal Cancer Screening  * Colonoscopy    * Fecal Occult Blood Test (FOBT)/Fecal Immunochemical Test (FIT)  * Fecal DNA/Cologuard Test  * Flexible Sigmoidoscopy Age: 39-70 years old   Colonoscopy: every 10 years (may be performed more frequently if at higher risk)  OR  FOBT/FIT: every 1 year  OR  Cologuard: every 3 years  OR  Sigmoidoscopy: every 5 years  Screening may be recommended earlier than age 39 if at higher risk for colorectal cancer  Also, an individualized decision between you and your healthcare provider will decide whether screening between the ages of 74-80 would be appropriate  Colonoscopy: 03/24/2020  FOBT/FIT: Not on file  Cologuard: Not on file  Sigmoidoscopy: Not on file          Breast Cancer Screening Age: 36 years old  Frequency: every 1-2 years  Not required if history of left and right mastectomy Mammogram: Not on file        Cervical Cancer Screening Between the ages of 21-29, pap smear recommended once every 3 years  Between the ages of 33-67, can perform pap smear with HPV co-testing every 5 years     Recommendations may differ for women with a history of total hysterectomy, cervical cancer, or abnormal pap smears in past  Pap Smear: Not on file    Screening Not Indicated   Hepatitis C Screening Once for adults born between [facility]  More frequently in patients at high risk for Hepatitis C Hep C Antibody: Not on file        Diabetes Screening 1-2 times per year if you're at risk for diabetes or have pre-diabetes Fasting glucose: 99 mg/dL (6/17/2022)  A1C: No results in last 5 years (No results in last 5 years)  Screening Current   Cholesterol Screening Once every 5 years if you don't have a lipid disorder  May order more often based on risk factors  Lipid panel: 06/17/2022    Screening Current     Other Preventive Screenings Covered by Medicare:  1  Abdominal Aortic Aneurysm (AAA) Screening: covered once if your at risk  You're considered to be at risk if you have a family history of AAA  2  Lung Cancer Screening: covers low dose CT scan once per year if you meet all of the following conditions: (1) Age 50-69; (2) No signs or symptoms of lung cancer; (3) Current smoker or have quit smoking within the last 15 years; (4) You have a tobacco smoking history of at least 20 pack years (packs per day multiplied by number of years you smoked); (5) You get a written order from a healthcare provider  3  Glaucoma Screening: covered annually if you're considered high risk: (1) You have diabetes OR (2) Family history of glaucoma OR (3)  aged 48 and older OR (3)  American aged 72 and older  3  Osteoporosis Screening: covered every 2 years if you meet one of the following conditions: (1) You're estrogen deficient and at risk for osteoporosis based off medical history and other findings; (2) Have a vertebral abnormality; (3) On glucocorticoid therapy for more than 3 months; (4) Have primary hyperparathyroidism; (5) On osteoporosis medications and need to assess response to drug therapy  · Last bone density test (DXA Scan): Not on file  5  HIV Screening: covered annually if you're between the age of 12-76  Also covered annually if you are younger than 13 and older than 72 with risk factors for HIV infection   For pregnant patients, it is covered up to 3 times per pregnancy  Immunizations:  Immunization Recommendations   Influenza Vaccine Annual influenza vaccination during flu season is recommended for all persons aged >= 6 months who do not have contraindications   Pneumococcal Vaccine   * Pneumococcal conjugate vaccine = PCV13 (Prevnar 13), PCV15 (Vaxneuvance), PCV20 (Prevnar 20)  * Pneumococcal polysaccharide vaccine = PPSV23 (Pneumovax) Adults 25-60 years old: 1-3 doses may be recommended based on certain risk factors  Adults 72 years old: 1-2 doses may be recommended based off what pneumonia vaccine you previously received   Hepatitis B Vaccine 3 dose series if at intermediate or high risk (ex: diabetes, end stage renal disease, liver disease)   Tetanus (Td) Vaccine - COST NOT COVERED BY MEDICARE PART B Following completion of primary series, a booster dose should be given every 10 years to maintain immunity against tetanus  Td may also be given as tetanus wound prophylaxis  Tdap Vaccine - COST NOT COVERED BY MEDICARE PART B Recommended at least once for all adults  For pregnant patients, recommended with each pregnancy  Shingles Vaccine (Shingrix) - COST NOT COVERED BY MEDICARE PART B  2 shot series recommended in those aged 48 and above     Health Maintenance Due:      Topic Date Due    Hepatitis C Screening  Never done    Breast Cancer Screening: Mammogram  Never done    Colorectal Cancer Screening  Never done     Immunizations Due:      Topic Date Due    COVID-19 Vaccine (1) Never done    Influenza Vaccine (1) 09/01/2022     Advance Directives   What are advance directives? Advance directives are legal documents that state your wishes and plans for medical care  These plans are made ahead of time in case you lose your ability to make decisions for yourself  Advance directives can apply to any medical decision, such as the treatments you want, and if you want to donate organs  What are the types of advance directives?   There are many types of advance directives, and each state has rules about how to use them  You may choose a combination of any of the following:  · Living will: This is a written record of the treatment you want  You can also choose which treatments you do not want, which to limit, and which to stop at a certain time  This includes surgery, medicine, IV fluid, and tube feedings  · Durable power of  for healthcare [facility]): This is a written record that states who you want to make healthcare choices for you when you are unable to make them for yourself  This person, called a proxy, is usually a family member or a friend  You may choose more than 1 proxy  · Do not resuscitate (DNR) order:  A DNR order is used in case your heart stops beating or you stop breathing  It is a request not to have certain forms of treatment, such as CPR  A DNR order may be included in other types of advance directives  · Medical directive: This covers the care that you want if you are in a coma, near death, or unable to make decisions for yourself  You can list the treatments you want for each condition  Treatment may include pain medicine, surgery, blood transfusions, dialysis, IV or tube feedings, and a ventilator (breathing machine)  · Values history: This document has questions about your views, beliefs, and how you feel and think about life  This information can help others choose the care that you would choose  Why are advance directives important? An advance directive helps you control your care  Although spoken wishes may be used, it is better to have your wishes written down  Spoken wishes can be misunderstood, or not followed  Treatments may be given even if you do not want them  An advance directive may make it easier for your family to make difficult choices about your care     Weight Management   Why it is important to manage your weight:  Being overweight increases your risk of health conditions such as heart disease, high blood pressure, type 2 diabetes, and certain types of cancer  It can also increase your risk for osteoarthritis, sleep apnea, and other respiratory problems  Aim for a slow, steady weight loss  Even a small amount of weight loss can lower your risk of health problems  How to lose weight safely:  A safe and healthy way to lose weight is to eat fewer calories and get regular exercise  You can lose up about 1 pound a week by decreasing the number of calories you eat by 500 calories each day  Healthy meal plan for weight management:  A healthy meal plan includes a variety of foods, contains fewer calories, and helps you stay healthy  A healthy meal plan includes the following:  · Eat whole-grain foods more often  A healthy meal plan should contain fiber  Fiber is the part of grains, fruits, and vegetables that is not broken down by your body  Whole-grain foods are healthy and provide extra fiber in your diet  Some examples of whole-grain foods are whole-wheat breads and pastas, oatmeal, brown rice, and bulgur  · Eat a variety of vegetables every day  Include dark, leafy greens such as spinach, kale, babak greens, and mustard greens  Eat yellow and orange vegetables such as carrots, sweet potatoes, and winter squash  · Eat a variety of fruits every day  Choose fresh or canned fruit (canned in its own juice or light syrup) instead of juice  Fruit juice has very little or no fiber  · Eat low-fat dairy foods  Drink fat-free (skim) milk or 1% milk  Eat fat-free yogurt and low-fat cottage cheese  Try low-fat cheeses such as mozzarella and other reduced-fat cheeses  · Choose meat and other protein foods that are low in fat  Choose beans or other legumes such as split peas or lentils  Choose fish, skinless poultry (chicken or turkey), or lean cuts of red meat (beef or pork)  Before you cook meat or poultry, cut off any visible fat  · Use less fat and oil  Try baking foods instead of frying them   Add less fat, such as margarine, sour cream, regular salad dressing and mayonnaise to foods  Eat fewer high-fat foods  Some examples of high-fat foods include french fries, doughnuts, ice cream, and cakes  · Eat fewer sweets  Limit foods and drinks that are high in sugar  This includes candy, cookies, regular soda, and sweetened drinks  Exercise:  Exercise at least 30 minutes per day on most days of the week  Some examples of exercise include walking, biking, dancing, and swimming  You can also fit in more physical activity by taking the stairs instead of the elevator or parking farther away from stores  Ask your healthcare provider about the best exercise plan for you  © Copyright [employer] 2018 Information is for End User's use only and may not be sold, redistributed or otherwise used for commercial purposes   All illustrations and images included in CareNotes® are the copyrighted property of A D A M , Inc  or [address] [place] [address]

## 2022-08-31 NOTE — ASSESSMENT & PLAN NOTE
Continued to use diclofenac  I did order uric acid testing  Patient does not feel pain is related to gout has she had an injury in the right foot in the past   However I would like to rule out gout  It would be best to do an aspiration however will check uric acid in the blood for now  Have testing will call with results

## 2022-09-02 LAB — BACTERIA UR CULT: NORMAL

## 2022-10-03 ENCOUNTER — TELEPHONE (OUTPATIENT)
Dept: OBGYN CLINIC | Facility: HOSPITAL | Age: 77
End: 2022-10-03

## 2022-10-11 PROBLEM — R05.9 COUGH: Status: RESOLVED | Noted: 2022-06-15 | Resolved: 2022-10-11

## 2022-10-11 PROBLEM — Z13.6 SCREENING FOR CARDIOVASCULAR CONDITION: Status: RESOLVED | Noted: 2022-06-15 | Resolved: 2022-10-11

## 2022-10-14 ENCOUNTER — CLINICAL SUPPORT (OUTPATIENT)
Dept: FAMILY MEDICINE CLINIC | Facility: CLINIC | Age: 77
End: 2022-10-14
Payer: COMMERCIAL

## 2022-10-14 DIAGNOSIS — Z23 COVID-19 VACCINE ADMINISTERED: Primary | ICD-10-CM

## 2022-10-14 DIAGNOSIS — Z23 INFLUENZA VACCINE ADMINISTERED: ICD-10-CM

## 2022-10-14 PROCEDURE — 0124A ADM SARSCV2 BVL 30MCG/.3ML B: CPT

## 2022-10-14 PROCEDURE — G0008 ADMIN INFLUENZA VIRUS VAC: HCPCS

## 2022-10-14 PROCEDURE — 90662 IIV NO PRSV INCREASED AG IM: CPT

## 2022-10-14 PROCEDURE — 91312 SARSCOV2 VAC BVL 30MCG/0.3ML: CPT

## 2022-10-18 ENCOUNTER — TELEPHONE (OUTPATIENT)
Dept: FAMILY MEDICINE CLINIC | Facility: CLINIC | Age: 77
End: 2022-10-18

## 2022-11-16 DIAGNOSIS — E06.3 HASHIMOTO'S DISEASE: Primary | ICD-10-CM

## 2022-11-22 ENCOUNTER — HOSPITAL ENCOUNTER (OUTPATIENT)
Dept: ULTRASOUND IMAGING | Facility: HOSPITAL | Age: 77
Discharge: HOME | End: 2022-11-22

## 2022-11-22 DIAGNOSIS — E06.3 HASHIMOTO'S DISEASE: ICD-10-CM

## 2022-11-28 ENCOUNTER — TELEPHONE (OUTPATIENT)
Dept: FAMILY MEDICINE CLINIC | Facility: CLINIC | Age: 77
End: 2022-11-28

## 2022-11-28 NOTE — TELEPHONE ENCOUNTER
----- Message from Rosetta Winkler, [address] sent at 11/28/2022  8:07 AM EST -----  Thyroid ultrasound was normal for age
Spoke to bhavna she is aware, she will be calling back with # to Dr Winkler's office who is out of network, results will need to be faxed over to him as well 
[FreeTextEntry6] : papa was with grandma\par was sitting on a stool and fell forward\par cried immediately, no LOC\par no vomiting\par \par

## 2022-12-15 ENCOUNTER — TELEPHONE (OUTPATIENT)
Dept: FAMILY MEDICINE CLINIC | Facility: CLINIC | Age: 77
End: 2022-12-15

## 2022-12-15 NOTE — TELEPHONE ENCOUNTER
She woke up in the middle of the night and felt like she was getting a cold  Headache, sensation of a fever and runny nose  She was in a courtroom in [place]  She did a home test and it came up positive

## 2022-12-16 ENCOUNTER — TELEMEDICINE (OUTPATIENT)
Dept: FAMILY MEDICINE CLINIC | Facility: CLINIC | Age: 77
End: 2022-12-16

## 2022-12-16 DIAGNOSIS — U07.1 COVID-19: Primary | ICD-10-CM

## 2022-12-16 DIAGNOSIS — R52 BODY ACHES: ICD-10-CM

## 2022-12-16 RX ORDER — NIRMATRELVIR AND RITONAVIR 300-100 MG
3 KIT ORAL 2 TIMES DAILY
Qty: 30 TABLET | Refills: 0 | Status: SHIPPED | OUTPATIENT
Start: 2022-12-16 | End: 2022-12-21

## 2022-12-16 RX ORDER — DEXTROMETHORPHAN HYDROBROMIDE AND PROMETHAZINE HYDROCHLORIDE 15; 6.25 MG/5ML; MG/5ML
5 SOLUTION ORAL 4 TIMES DAILY PRN
Qty: 240 ML | Refills: 0 | Status: SHIPPED | OUTPATIENT
Start: 2022-12-16

## 2022-12-16 NOTE — PROGRESS NOTES
Virtual Regular Visit    Verification of patient location:    Patient is located in the following state in which I hold an active license [place]      Assessment/Plan:    Problem List Items Addressed This Visit        Other    Body aches     Long discussion about current treatment plan, doing better on low dose naproxen  Other Visit Diagnoses     COVID-19    -  Primary    Suggest staying well hydrated, take vit c d and zinc, paxlovid as prescribed  cough syrup as needed every 8 hours  Netti pot  Ibuprofen and tylenol for HA    Relevant Medications    nirmatrelvir & ritonavir (Paxlovid, 300/100,) tablet therapy pack    Promethazine-DM (PHENERGAN-DM) 6 25-15 mg/5 mL oral syrup               Reason for visit is   Chief Complaint   Patient presents with   • Virtual Regular Visit        Encounter provider Shay Jim    Provider located at [facility] [address] [facility]  [address] [facility] [id number] [phone]      Recent Visits  Date Type Provider Dept   12/15/22 Telephone M[address] 23 recent visits within past 7 days and meeting all other requirements  Today's Visits  Date Type Provider Dept   12/16/22 Telemedicine MARTIN Jim Pg  [phone] [address] [place] [address]   Showing today's visits and meeting all other requirements  Future Appointments  No visits were found meeting these conditions  Showing future appointments within next 150 days and meeting all other requirements       The patient was identified by name and date of birth  Manolorosetta Kylie was informed that this is a telemedicine visit and that the visit is being conducted through the [address] Now platform  She agrees to proceed     My office door was closed  No one else was in the room  She acknowledged consent and understanding of privacy and security of the video platform   The patient has agreed to participate and understands they can discontinue the visit at any time  Patient is aware this is a billable service  Subjective  Hossein Kingsley is a 78 y o  female who started getting sick Wednesday night she had a sudden onset of a very significant cold  Vanemehdi Hines was in [facility] on Monday, she started getting sick Wednesday  She had to evict the woman she had taken into her apartment  She did a lot of outdoor work the previous week  She tested positive for COVID yesterday  3 months ago Dixie's back and hands and pelvic area were getting much worse  She started to see the chiropractor on the suggestion of her   She started seeing a multipurpose NP from [place], he tested her blood and told her she had an autoimmune work up, she started on low dose naproxen, she tested positive for Lupus and thyroiditis  She is feeling better due to her body aches          Past Medical History:   Diagnosis Date   • Disease of thyroid gland        Past Surgical History:   Procedure Laterality Date   • FL INJECTION RIGHT SHOULDER (ARTHROGRAM)  11/18/2019   • JOINT REPLACEMENT     • KNEE SURGERY Left 2021   • SHOULDER SURGERY Right 2012    orif       Current Outpatient Medications   Medication Sig Dispense Refill   • nirmatrelvir & ritonavir (Paxlovid, 300/100,) tablet therapy pack Take 3 tablets by mouth 2 (two) times a day for 5 days Take 2 nirmatrelvir tablets + 1 ritonavir tablet together per dose 30 tablet 0   • Promethazine-DM (PHENERGAN-DM) 6 25-15 mg/5 mL oral syrup Take 5 mL by mouth 4 (four) times a day as needed for cough 240 mL 0   • Ascorbic Acid (VITAMIN C) 1000 MG tablet Take 1,500 mg by mouth 2 (two) times a day     • clobetasol (TEMOVATE) 0 05 % ointment Apply topically 2 (two) times a day 30 g 0   • diclofenac (VOLTAREN) 75 mg EC tablet Take 1 tablet (75 mg total) by mouth 2 (two) times a day 60 tablet 2   • levothyroxine 75 mcg tablet Take 1 tablet (75 mcg total) by mouth daily in the early morning 90 tablet 1   • mirtazapine (REMERON) 15 mg tablet Take 1 tablet (15 mg total) by mouth daily at bedtime 90 tablet 1   • multivitamin (THERAGRAN) TABS Take 1 tablet by mouth daily     • mupirocin (BACTROBAN) 2 % ointment Apply topically 3 (three) times a day 22 g 0   • NON FORMULARY wobenzyme     • PreviDent 5000 Booster Plus 1 1 % PSTE USE ONCE A DAY AT BEDTIME       No current facility-administered medications for this visit  No Known Allergies    Review of Systems   Constitutional: Positive for chills, fatigue and fever  Negative for diaphoresis  HENT: Positive for congestion, postnasal drip, rhinorrhea, sinus pressure and sinus pain  Negative for ear pain and sore throat  Eyes: Negative for pain and visual disturbance  Respiratory: Positive for cough  Negative for chest tightness, shortness of breath and wheezing  Cardiovascular: Negative for chest pain and palpitations  Gastrointestinal: Negative for abdominal pain, diarrhea, nausea and vomiting  Genitourinary: Negative for dysuria, frequency, hematuria and urgency  Musculoskeletal: Positive for arthralgias and myalgias  Negative for back pain  Skin: Negative for color change and rash  Neurological: Positive for headaches  Negative for dizziness, seizures, syncope and light-headedness  All other systems reviewed and are negative  Video Exam    There were no vitals filed for this visit  Physical Exam  Vitals reviewed: video visit attempted but unable to connect            I spent 10 minutes directly with the patient during this visit

## 2022-12-20 ENCOUNTER — TELEPHONE (OUTPATIENT)
Dept: FAMILY MEDICINE CLINIC | Facility: CLINIC | Age: 77
End: 2022-12-20

## 2022-12-20 NOTE — TELEPHONE ENCOUNTER
Erica Edwards said you called her twice and she missed your call  You were in a room I told her I will send you a message   Thank you

## 2022-12-22 DIAGNOSIS — U07.1 COVID-19: Primary | ICD-10-CM

## 2022-12-22 RX ORDER — COVID-19 MOLECULAR TEST ASSAY
1 KIT MISCELLANEOUS DAILY
Qty: 5 KIT | Refills: 1 | Status: SHIPPED | OUTPATIENT
Start: 2022-12-22

## 2022-12-27 DIAGNOSIS — B00.9 HSV (HERPES SIMPLEX VIRUS) INFECTION: Primary | ICD-10-CM

## 2022-12-27 RX ORDER — ACYCLOVIR 400 MG/1
400 TABLET ORAL 3 TIMES DAILY
Qty: 21 TABLET | Refills: 0 | Status: SHIPPED | OUTPATIENT
Start: 2022-12-27 | End: 2023-08-10

## 2023-01-03 DIAGNOSIS — F33.41 RECURRENT MAJOR DEPRESSIVE DISORDER, IN PARTIAL REMISSION (HCC): ICD-10-CM

## 2023-01-03 RX ORDER — MIRTAZAPINE 15 MG/1
TABLET, FILM COATED ORAL
Qty: 90 TABLET | Refills: 1 | Status: SHIPPED | OUTPATIENT
Start: 2023-01-03

## 2023-01-29 DIAGNOSIS — E03.9 HYPOTHYROIDISM, UNSPECIFIED TYPE: ICD-10-CM

## 2023-01-30 RX ORDER — LEVOTHYROXINE SODIUM 0.07 MG/1
75 TABLET ORAL
Qty: 90 TABLET | Refills: 1 | Status: SHIPPED | OUTPATIENT
Start: 2023-01-30

## 2023-02-07 ENCOUNTER — OFFICE VISIT (OUTPATIENT)
Dept: FAMILY MEDICINE CLINIC | Facility: CLINIC | Age: 78
End: 2023-02-07

## 2023-02-07 VITALS
WEIGHT: 163 LBS | HEIGHT: 66 IN | OXYGEN SATURATION: 98 % | SYSTOLIC BLOOD PRESSURE: 116 MMHG | TEMPERATURE: 98.2 F | DIASTOLIC BLOOD PRESSURE: 74 MMHG | HEART RATE: 78 BPM | BODY MASS INDEX: 26.2 KG/M2

## 2023-02-07 DIAGNOSIS — H60.333 ACUTE SWIMMER'S EAR OF BOTH SIDES: ICD-10-CM

## 2023-02-07 DIAGNOSIS — F33.41 RECURRENT MAJOR DEPRESSIVE DISORDER, IN PARTIAL REMISSION (HCC): ICD-10-CM

## 2023-02-07 DIAGNOSIS — L29.9 ITCHING: ICD-10-CM

## 2023-02-07 DIAGNOSIS — H61.21 IMPACTED CERUMEN OF RIGHT EAR: ICD-10-CM

## 2023-02-07 DIAGNOSIS — L65.9 HAIR LOSS: Primary | ICD-10-CM

## 2023-02-07 RX ORDER — HYDROXYZINE HYDROCHLORIDE 10 MG/1
10 TABLET, FILM COATED ORAL EVERY 6 HOURS PRN
Qty: 30 TABLET | Refills: 0 | Status: SHIPPED | OUTPATIENT
Start: 2023-02-07

## 2023-02-07 RX ORDER — ACYCLOVIR 400 MG/1
TABLET ORAL
COMMUNITY

## 2023-02-07 RX ORDER — CIPROFLOXACIN AND DEXAMETHASONE 3; 1 MG/ML; MG/ML
4 SUSPENSION/ DROPS AURICULAR (OTIC) 2 TIMES DAILY
Qty: 7.5 ML | Refills: 0 | Status: SHIPPED | OUTPATIENT
Start: 2023-02-07

## 2023-02-07 RX ORDER — AZELASTINE HYDROCHLORIDE 137 UG/1
SPRAY, METERED NASAL
COMMUNITY
Start: 2023-01-04

## 2023-02-07 NOTE — PROGRESS NOTES
Assessment and Plan:     Problem List Items Addressed This Visit    None       Preventive health issues were discussed with patient, and age appropriate screening tests were ordered as noted in patient's After Visit Summary  Personalized health advice and appropriate referrals for health education or preventive services given if needed, as noted in patient's After Visit Summary  History of Present Illness:     Patient presents for a Medicare Wellness Visit    HPI   Patient Care Team:  MARTIN Schwab as PCP - General (Nurse Practitioner)     Review of Systems:     Review of Systems     Problem List:     Patient Active Problem List   Diagnosis   • Animal bite   • Tinnitus of both ears   • Great toe pain, right   • Recurrent major depressive disorder, in partial remission ([facility] [address] )   • Pain in both hands   • Basal cell carcinoma (BCC) of skin of face   • Urinary frequency   • Right hip pain   • Poor dentition   • Allergies   • Fatigue   • Hypothyroidism   • Body aches   • Urinary urgency      Past Medical and Surgical History:     Past Medical History:   Diagnosis Date   • Disease of thyroid gland      Past Surgical History:   Procedure Laterality Date   • FL INJECTION RIGHT SHOULDER (ARTHROGRAM)  2019   • JOINT REPLACEMENT     • KNEE SURGERY Left    • SHOULDER SURGERY Right     orif      Family History:     Family History   Problem Relation Age of Onset   • Polymyalgia rheumatica Mother    • Diabetes Father    • Diabetes Sister       Social History:     Social History     Socioeconomic History   • Marital status: Single     Spouse name: None   • Number of children: None   • Years of education: None   • Highest education level: None   Occupational History   • None   Tobacco Use   • Smoking status: Former     Types: Cigarettes     Quit date:      Years since quittin 1   • Smokeless tobacco: Never   Substance and Sexual Activity   • Alcohol use:  Yes     Alcohol/week: 1 0 standard drink Types: 1 Glasses of wine per week   • Drug use: Never   • Sexual activity: None   Other Topics Concern   • None   Social History Narrative   • None     Social Determinants of Health     Financial Resource Strain: Low Risk    • Difficulty of Paying Living Expenses: Not hard at all   Food Insecurity: Not on file   Transportation Needs: No Transportation Needs   • Lack of Transportation (Medical): No   • Lack of Transportation (Non-Medical): No   Physical Activity: Not on file   Stress: Not on file   Social Connections: Not on file   Intimate Partner Violence: Not on file   Housing Stability: Not on file      Medications and Allergies:     Current Outpatient Medications   Medication Sig Dispense Refill   • acyclovir (ZOVIRAX) 400 MG tablet Take 1 tablet (400 mg total) by mouth 3 (three) times a day for 7 days 21 tablet 0   • acyclovir (ZOVIRAX) 400 MG tablet      • Azelastine HCl 137 MCG/SPRAY SOLN 1 SPRAY DAILY TO BOTH NOSTRILS     • clobetasol (TEMOVATE) 0 05 % ointment Apply topically 2 (two) times a day 30 g 0   • COVID-19 At Home Antigen Test (BinaxNOW COVID-19 Ag Home Test) KIT 1 Units into each nostril in the morning 5 kit 1   • COVID-19 At Home Antigen Test (BinaxNOW COVID-19 Ag Home Test) KIT 1 Device into each nostril in the morning 5 kit 1   • levothyroxine 75 mcg tablet TAKE 1 TABLET (75 MCG TOTAL) BY MOUTH DAILY IN THE EARLY MORNING 90 tablet 1   • mirtazapine (REMERON) 15 mg tablet TAKE 1 TABLET BY MOUTH DAILY AT BEDTIME 90 tablet 1   • mupirocin (BACTROBAN) 2 % ointment Apply topically 3 (three) times a day 22 g 0   • NON FORMULARY wobenzyme     • PreviDent 5000 Booster Plus 1 1 % PSTE USE ONCE A DAY AT BEDTIME       No current facility-administered medications for this visit       No Known Allergies   Immunizations:     Immunization History   Administered Date(s) Administered   • COVID-19 Pfizer Vac BIVALENT Dominic-sucrose 12 Yr+ IM (BOOSTER ONLY) 10/14/2022   • INFLUENZA 10/14/2022   • Influenza, high dose seasonal 0 7 mL 10/10/2012, 11/18/2013, 10/29/2014, 10/21/2015, 12/05/2016, 10/14/2022   • Pneumococcal Conjugate 13-Valent 02/06/2017   • Pneumococcal Polysaccharide PPV23 01/23/2018   • Tdap 09/10/2021   • influenza, trivalent, adjuvanted 10/18/2019      Health Maintenance:         Topic Date Due   • Hepatitis C Screening  Never done   • Breast Cancer Screening: Mammogram  Never done   • Colorectal Cancer Screening  Never done         Topic Date Due   • COVID-19 Vaccine (1) 1945      Medicare Screening Tests and Risk Assessments:         Depression Screening:   PHQ-9 Score: 0      PREVENTIVE SCREENINGS      Cardiovascular Screening:    General: Screening Current      Diabetes Screening:     General: Screening Current      Cervical Cancer Screening:    General: Screening Not Indicated      Lung Cancer Screening:     General: Screening Not Indicated    No results found  Physical Exam:     There were no vitals taken for this visit      Physical Exam     MARTIN Schwab

## 2023-02-07 NOTE — PROGRESS NOTES
Assessment/Plan:         Problem List Items Addressed This Visit        Other    Recurrent major depressive disorder, in partial remission ([address] )     Doing well on remeron, follow up next week         Relevant Medications    hydrOXYzine HCL (ATARAX) 10 mg tablet    Hair loss - Primary     Patient has had hair loss on her head in spots since childhood, referral placed to dermatology  Relevant Orders    Ambulatory Referral to Dermatology   Other Visit Diagnoses     Itching        Hydroxazine as needed for itch    Relevant Medications    hydrOXYzine HCL (ATARAX) 10 mg tablet    Acute swimmer's ear of both sides        ear cleaned today, ear drops both ears for 5 days  Relevant Medications    ciprofloxacin-dexamethasone (CIPRODEX) otic suspension    Impacted cerumen of right ear        cleaned successfully today    Relevant Orders    Ear cerumen removal (Completed)            Subjective:      Patient ID: Ladarius Ramirez is a 78 y o  female  [facility] is here for right ear pain, she went swimming over a week ago and immediate her ear plug leaked  She also gave up her apartment in [place], she has seen a pain specialist and rheumatology since her last visit  The following portions of the patient's history were reviewed and updated as appropriate:   Past Medical History:  She has a past medical history of Disease of thyroid gland  ,  _______________________________________________________________________  Medical Problems:  does not have any pertinent problems on file ,  _______________________________________________________________________  Past Surgical History:   has a past surgical history that includes Shoulder surgery (Right, 2012); FL injection right shoulder (arthrogram) (11/18/2019); Joint replacement; and Knee surgery (Left, 2021)  ,  _______________________________________________________________________  Family History:  family history includes Diabetes in her father and sister;  Polymyalgia rheumatica in her mother ,  _______________________________________________________________________  Social History:   reports that she quit smoking about 41 years ago  Her smoking use included cigarettes  She has never used smokeless tobacco  She reports current alcohol use of about 1 0 standard drink per week  She reports that she does not use drugs  ,  _______________________________________________________________________  Allergies:  has No Known Allergies     _______________________________________________________________________  Current Outpatient Medications   Medication Sig Dispense Refill   • ciprofloxacin-dexamethasone (CIPRODEX) otic suspension Administer 4 drops into both ears 2 (two) times a day 7 5 mL 0   • hydrOXYzine HCL (ATARAX) 10 mg tablet Take 1 tablet (10 mg total) by mouth every 6 (six) hours as needed for itching 30 tablet 0   • acyclovir (ZOVIRAX) 400 MG tablet Take 1 tablet (400 mg total) by mouth 3 (three) times a day for 7 days 21 tablet 0   • acyclovir (ZOVIRAX) 400 MG tablet      • Azelastine HCl 137 MCG/SPRAY SOLN 1 SPRAY DAILY TO BOTH NOSTRILS     • clobetasol (TEMOVATE) 0 05 % ointment Apply topically 2 (two) times a day 30 g 0   • COVID-19 At Home Antigen Test ([facility]  Home Test) KIT 1 Units into each nostril in the morning 5 kit 1   • COVID-19 At Home Antigen Test (BinaxNOW COVID-19  Home Test) KIT 1 Device into each nostril in the morning 5 kit 1   • levothyroxine 75 mcg tablet TAKE 1 TABLET (75 MCG TOTAL) BY MOUTH DAILY IN THE EARLY MORNING 90 tablet 1   • mirtazapine (REMERON) 15 mg tablet TAKE 1 TABLET BY MOUTH DAILY AT BEDTIME 90 tablet 1   • mupirocin (BACTROBAN) 2 % ointment Apply topically 3 (three) times a day 22 g 0   • NON FORMULARY wobenzyme     • PreviDent 5000 Booster Plus 1 1 % PSTE USE ONCE A DAY AT BEDTIME       No current facility-administered medications for this visit      _______________________________________________________________________  Review of Systems Constitutional: Positive for fatigue  Negative for chills, diaphoresis and fever  HENT: Positive for ear pain and postnasal drip  Negative for congestion, rhinorrhea, sinus pressure, sinus pain and sore throat  Eyes: Negative for pain and visual disturbance  Respiratory: Negative for cough, chest tightness, shortness of breath and wheezing  Cardiovascular: Negative for chest pain and palpitations  Gastrointestinal: Negative for abdominal pain, constipation, diarrhea, nausea and vomiting  Genitourinary: Positive for frequency and urgency  Negative for dysuria and hematuria  Musculoskeletal: Positive for arthralgias, gait problem and myalgias  Negative for back pain  Skin: Negative for color change and rash  Neurological: Negative for dizziness, seizures, syncope, light-headedness and headaches  Psychiatric/Behavioral: Negative for dysphoric mood and sleep disturbance  The patient is not nervous/anxious  All other systems reviewed and are negative  Objective:  Vitals:    02/07/23 0854   BP: 116/74   Pulse: 78   Temp: 98 2 °F (36 8 °C)   SpO2: 98%   Weight: 73 9 kg (163 lb)   Height: 5' 5 5" (1 664 m)     Body mass index is 26 71 kg/m²  Physical Exam  Vitals and nursing note reviewed  Constitutional:       General: She is not in acute distress  Appearance: Normal appearance  She is not ill-appearing  HENT:      Head: Normocephalic  Right Ear: External ear normal  There is impacted cerumen  Left Ear: Tympanic membrane, ear canal and external ear normal  There is no impacted cerumen  Nose: Nose normal  No congestion  Mouth/Throat:      Mouth: Mucous membranes are moist       Pharynx: Oropharynx is clear  No posterior oropharyngeal erythema  Eyes:      Extraocular Movements: Extraocular movements intact  Conjunctiva/sclera: Conjunctivae normal       Pupils: Pupils are equal, round, and reactive to light     Cardiovascular:      Rate and Rhythm: Normal rate and regular rhythm  Pulses: Normal pulses  Heart sounds: Normal heart sounds  Pulmonary:      Effort: Pulmonary effort is normal  No respiratory distress  Breath sounds: Normal breath sounds  No wheezing  Abdominal:      General: Bowel sounds are normal       Palpations: Abdomen is soft  Musculoskeletal:         General: No swelling or tenderness  Normal range of motion  Cervical back: Normal range of motion  No tenderness  Right lower leg: No edema  Left lower leg: No edema  Lymphadenopathy:      Cervical: No cervical adenopathy  Skin:     General: Skin is warm and dry  Neurological:      General: No focal deficit present  Mental Status: She is alert and oriented to person, place, and time  Psychiatric:         Mood and Affect: Mood normal          Behavior: Behavior normal          Thought Content: Thought content normal          Judgment: Judgment normal          Ear cerumen removal    Date/Time: 2/7/2023 10:30 AM  Performed by: MARTIN Romo  Authorized by: MARTIN Romo   Universal Protocol:  Consent: Verbal consent obtained  Risks and benefits: risks, benefits and alternatives were discussed  Consent given by: patient  Time out: Immediately prior to procedure a "time out" was called to verify the correct patient, procedure, equipment, support staff and site/side marked as required    Timeout called at: 2/7/2023 9:00 AM   Patient understanding: patient states understanding of the procedure being performed  Patient consent: the patient's understanding of the procedure matches consent given  Procedure consent: procedure consent matches procedure scheduled  Required items: required blood products, implants, devices, and special equipment available  Patient identity confirmed: verbally with patient      Patient location:  Clinic  Procedure details:     Location:  R ear    Procedure type: irrigation with instrumentation      Instrumentation: curette      Approach:  External  Post-procedure details:     Complication:  None    Hearing quality:  Improved    Patient tolerance of procedure:   Tolerated well, no immediate complications

## 2023-02-08 ENCOUNTER — RA CDI HCC (OUTPATIENT)
Dept: OTHER | Facility: HOSPITAL | Age: 78
End: 2023-02-08

## 2023-02-08 NOTE — PROGRESS NOTES
[facility]  coding opportunities       Chart reviewed, no opportunity found:   [address]        Patients Insurance     Medicare Insurance: [employer] Advantage

## 2023-02-14 ENCOUNTER — OFFICE VISIT (OUTPATIENT)
Dept: FAMILY MEDICINE CLINIC | Facility: CLINIC | Age: 78
End: 2023-02-14

## 2023-02-14 VITALS
HEART RATE: 74 BPM | OXYGEN SATURATION: 97 % | DIASTOLIC BLOOD PRESSURE: 64 MMHG | BODY MASS INDEX: 24.43 KG/M2 | HEIGHT: 66 IN | WEIGHT: 152 LBS | SYSTOLIC BLOOD PRESSURE: 116 MMHG

## 2023-02-14 DIAGNOSIS — Z12.31 SCREENING MAMMOGRAM, ENCOUNTER FOR: ICD-10-CM

## 2023-02-14 DIAGNOSIS — C44.310 BASAL CELL CARCINOMA (BCC) OF SKIN OF FACE, UNSPECIFIED PART OF FACE: ICD-10-CM

## 2023-02-14 DIAGNOSIS — E28.39 OVARIAN FAILURE DUE TO MENOPAUSE: Primary | ICD-10-CM

## 2023-02-14 DIAGNOSIS — M79.642 PAIN IN BOTH HANDS: ICD-10-CM

## 2023-02-14 DIAGNOSIS — L65.9 HAIR LOSS: ICD-10-CM

## 2023-02-14 DIAGNOSIS — M79.641 PAIN IN BOTH HANDS: ICD-10-CM

## 2023-02-14 NOTE — PATIENT INSTRUCTIONS
Medicare Preventive Visit Patient Instructions  Thank you for completing your Welcome to Medicare Visit or Medicare Annual Wellness Visit today  Your next wellness visit will be due in one year (2/15/2024)  The screening/preventive services that you may require over the next 5-10 years are detailed below  Some tests may not apply to you based off risk factors and/or age  Screening tests ordered at today's visit but not completed yet may show as past due  Also, please note that scanned in results may not display below  Preventive Screenings:  Service Recommendations Previous Testing/Comments   Colorectal Cancer Screening  * Colonoscopy    * Fecal Occult Blood Test (FOBT)/Fecal Immunochemical Test (FIT)  * Fecal DNA/Cologuard Test  * Flexible Sigmoidoscopy Age: 39-70 years old   Colonoscopy: every 10 years (may be performed more frequently if at higher risk)  OR  FOBT/FIT: every 1 year  OR  Cologuard: every 3 years  OR  Sigmoidoscopy: every 5 years  Screening may be recommended earlier than age 39 if at higher risk for colorectal cancer  Also, an individualized decision between you and your healthcare provider will decide whether screening between the ages of 74-80 would be appropriate  Colonoscopy: 03/24/2020  FOBT/FIT: Not on file  Cologuard: Not on file  Sigmoidoscopy: Not on file          Breast Cancer Screening Age: 36 years old  Frequency: every 1-2 years  Not required if history of left and right mastectomy Mammogram: Not on file        Cervical Cancer Screening Between the ages of 21-29, pap smear recommended once every 3 years  Between the ages of 33-67, can perform pap smear with HPV co-testing every 5 years     Recommendations may differ for women with a history of total hysterectomy, cervical cancer, or abnormal pap smears in past  Pap Smear: Not on file    Screening Not Indicated   Hepatitis C Screening Once for adults born between [facility]  More frequently in patients at high risk for Hepatitis C Hep C Antibody: Not on file        Diabetes Screening 1-2 times per year if you're at risk for diabetes or have pre-diabetes Fasting glucose: 99 mg/dL (6/17/2022)  A1C: No results in last 5 years (No results in last 5 years)  Screening Current   Cholesterol Screening Once every 5 years if you don't have a lipid disorder  May order more often based on risk factors  Lipid panel: 06/17/2022    Screening Current     Other Preventive Screenings Covered by Medicare:  1  Abdominal Aortic Aneurysm (AAA) Screening: covered once if your at risk  You're considered to be at risk if you have a family history of AAA  2  Lung Cancer Screening: covers low dose CT scan once per year if you meet all of the following conditions: (1) Age 50-69; (2) No signs or symptoms of lung cancer; (3) Current smoker or have quit smoking within the last 15 years; (4) You have a tobacco smoking history of at least 20 pack years (packs per day multiplied by number of years you smoked); (5) You get a written order from a healthcare provider  3  Glaucoma Screening: covered annually if you're considered high risk: (1) You have diabetes OR (2) Family history of glaucoma OR (3)  aged 48 and older OR (3)  American aged 72 and older  3  Osteoporosis Screening: covered every 2 years if you meet one of the following conditions: (1) You're estrogen deficient and at risk for osteoporosis based off medical history and other findings; (2) Have a vertebral abnormality; (3) On glucocorticoid therapy for more than 3 months; (4) Have primary hyperparathyroidism; (5) On osteoporosis medications and need to assess response to drug therapy  · Last bone density test (DXA Scan): Not on file  5  HIV Screening: covered annually if you're between the age of 12-76  Also covered annually if you are younger than 13 and older than 72 with risk factors for HIV infection   For pregnant patients, it is covered up to 3 times per pregnancy  Immunizations:  Immunization Recommendations   Influenza Vaccine Annual influenza vaccination during flu season is recommended for all persons aged >= 6 months who do not have contraindications   Pneumococcal Vaccine   * Pneumococcal conjugate vaccine = PCV13 (Prevnar 13), PCV15 (Vaxneuvance), PCV20 (Prevnar 20)  * Pneumococcal polysaccharide vaccine = PPSV23 (Pneumovax) Adults 25-60 years old: 1-3 doses may be recommended based on certain risk factors  Adults 72 years old: 1-2 doses may be recommended based off what pneumonia vaccine you previously received   Hepatitis B Vaccine 3 dose series if at intermediate or high risk (ex: diabetes, end stage renal disease, liver disease)   Tetanus (Td) Vaccine - COST NOT COVERED BY MEDICARE PART B Following completion of primary series, a booster dose should be given every 10 years to maintain immunity against tetanus  Td may also be given as tetanus wound prophylaxis  Tdap Vaccine - COST NOT COVERED BY MEDICARE PART B Recommended at least once for all adults  For pregnant patients, recommended with each pregnancy  Shingles Vaccine (Shingrix) - COST NOT COVERED BY MEDICARE PART B  2 shot series recommended in those aged 48 and above     Health Maintenance Due:      Topic Date Due   • Hepatitis C Screening  Never done   • Breast Cancer Screening: Mammogram  Never done   • Colorectal Cancer Screening  Never done     Immunizations Due:      Topic Date Due   • COVID-19 Vaccine (1) 1945     Advance Directives   What are advance directives? Advance directives are legal documents that state your wishes and plans for medical care  These plans are made ahead of time in case you lose your ability to make decisions for yourself  Advance directives can apply to any medical decision, such as the treatments you want, and if you want to donate organs  What are the types of advance directives?   There are many types of advance directives, and each state has rules about how to use them  You may choose a combination of any of the following:  · Living will: This is a written record of the treatment you want  You can also choose which treatments you do not want, which to limit, and which to stop at a certain time  This includes surgery, medicine, IV fluid, and tube feedings  · Durable power of  for healthcare [facility]): This is a written record that states who you want to make healthcare choices for you when you are unable to make them for yourself  This person, called a proxy, is usually a family member or a friend  You may choose more than 1 proxy  · Do not resuscitate (DNR) order:  A DNR order is used in case your heart stops beating or you stop breathing  It is a request not to have certain forms of treatment, such as CPR  A DNR order may be included in other types of advance directives  · Medical directive: This covers the care that you want if you are in a coma, near death, or unable to make decisions for yourself  You can list the treatments you want for each condition  Treatment may include pain medicine, surgery, blood transfusions, dialysis, IV or tube feedings, and a ventilator (breathing machine)  · Values history: This document has questions about your views, beliefs, and how you feel and think about life  This information can help others choose the care that you would choose  Why are advance directives important? An advance directive helps you control your care  Although spoken wishes may be used, it is better to have your wishes written down  Spoken wishes can be misunderstood, or not followed  Treatments may be given even if you do not want them  An advance directive may make it easier for your family to make difficult choices about your care     Weight Management   Why it is important to manage your weight:  Being overweight increases your risk of health conditions such as heart disease, high blood pressure, type 2 diabetes, and certain types of cancer  It can also increase your risk for osteoarthritis, sleep apnea, and other respiratory problems  Aim for a slow, steady weight loss  Even a small amount of weight loss can lower your risk of health problems  How to lose weight safely:  A safe and healthy way to lose weight is to eat fewer calories and get regular exercise  You can lose up about 1 pound a week by decreasing the number of calories you eat by 500 calories each day  Healthy meal plan for weight management:  A healthy meal plan includes a variety of foods, contains fewer calories, and helps you stay healthy  A healthy meal plan includes the following:  · Eat whole-grain foods more often  A healthy meal plan should contain fiber  Fiber is the part of grains, fruits, and vegetables that is not broken down by your body  Whole-grain foods are healthy and provide extra fiber in your diet  Some examples of whole-grain foods are whole-wheat breads and pastas, oatmeal, brown rice, and bulgur  · Eat a variety of vegetables every day  Include dark, leafy greens such as spinach, kale, babak greens, and mustard greens  Eat yellow and orange vegetables such as carrots, sweet potatoes, and winter squash  · Eat a variety of fruits every day  Choose fresh or canned fruit (canned in its own juice or light syrup) instead of juice  Fruit juice has very little or no fiber  · Eat low-fat dairy foods  Drink fat-free (skim) milk or 1% milk  Eat fat-free yogurt and low-fat cottage cheese  Try low-fat cheeses such as mozzarella and other reduced-fat cheeses  · Choose meat and other protein foods that are low in fat  Choose beans or other legumes such as split peas or lentils  Choose fish, skinless poultry (chicken or turkey), or lean cuts of red meat (beef or pork)  Before you cook meat or poultry, cut off any visible fat  · Use less fat and oil  Try baking foods instead of frying them   Add less fat, such as margarine, sour cream, regular salad dressing and mayonnaise to foods  Eat fewer high-fat foods  Some examples of high-fat foods include french fries, doughnuts, ice cream, and cakes  · Eat fewer sweets  Limit foods and drinks that are high in sugar  This includes candy, cookies, regular soda, and sweetened drinks  Exercise:  Exercise at least 30 minutes per day on most days of the week  Some examples of exercise include walking, biking, dancing, and swimming  You can also fit in more physical activity by taking the stairs instead of the elevator or parking farther away from stores  Ask your healthcare provider about the best exercise plan for you  © Copyright [employer] 2018 Information is for End User's use only and may not be sold, redistributed or otherwise used for commercial purposes   All illustrations and images included in CareNotes® are the copyrighted property of A D A M , Inc  or [address] [place] [address] [employer]

## 2023-02-14 NOTE — PROGRESS NOTES
Assessment and Plan:     Problem List Items Addressed This Visit        Musculoskeletal and Integument    Basal cell carcinoma (BCC) of skin of face     Please make appointment with dermatology  Other    Pain in both hands     Improved with naltrexone  Continue to follow with specialist, will continue to monitor  Hair loss     Make appointment with dermatology, patient was originally referred by endocrine due to possibility of discoid lupus  Hair loss is concentrated on the right side of the scalp on the top  Other Visit Diagnoses     Ovarian failure due to menopause    -  Primary    Have dexa, continue calcium supplement, recommend adding vitamin D    Relevant Orders    DXA bone density spine hip and pelvis    Screening mammogram, encounter for        Strongly recommend having mammo    Relevant Orders    Mammo screening bilateral w 3d & cad        BMI Counseling: Body mass index is 27 04 kg/m²  The BMI is above normal  Nutrition recommendations include decreasing portion sizes, encouraging healthy choices of fruits and vegetables, consuming healthier snacks, increasing intake of lean protein and reducing intake of cholesterol  Exercise recommendations include moderate physical activity 150 minutes/week and exercising 3-5 times per week  Rationale for BMI follow-up plan is due to patient being overweight or obese  Falls Plan of Care: Medications that increase falls were reviewed  Assessed feet and footwear  Vitamin D supplementation was recommended  Preventive health issues were discussed with patient, and age appropriate screening tests were ordered as noted in patient's After Visit Summary  Personalized health advice and appropriate referrals for health education or preventive services given if needed, as noted in patient's After Visit Summary  History of Present Illness:     Patient presents for a Medicare Wellness Visit    Gardner Goodell is here for follow up   She is having significant low back pain  She has not yet made an appointment with dermatology     Patient Care Team:  Teofilo Ewing as PCP - General (Nurse Practitioner)     Review of Systems:     Review of Systems   Constitutional: Negative for chills, diaphoresis, fatigue and fever  HENT: Positive for postnasal drip  Negative for congestion, ear pain, rhinorrhea, sinus pressure, sinus pain, sneezing and sore throat  Eyes: Negative for pain and visual disturbance  Respiratory: Negative for cough, chest tightness, shortness of breath and wheezing  Cardiovascular: Negative for chest pain and palpitations  Gastrointestinal: Negative for abdominal pain, constipation, diarrhea, nausea and vomiting  Genitourinary: Negative for dysuria, frequency, hematuria and urgency  Musculoskeletal: Positive for arthralgias and myalgias  Negative for back pain  Skin: Negative for color change and rash  Neurological: Negative for dizziness, seizures, syncope, facial asymmetry, light-headedness and headaches  Psychiatric/Behavioral: Negative for dysphoric mood  The patient is not nervous/anxious  All other systems reviewed and are negative         Problem List:     Patient Active Problem List   Diagnosis   • Animal bite   • Tinnitus of both ears   • Great toe pain, right   • Recurrent major depressive disorder, in partial remission (HCC)   • Pain in both hands   • Basal cell carcinoma (BCC) of skin of face   • Urinary frequency   • Right hip pain   • Poor dentition   • Allergies   • Fatigue   • Hypothyroidism   • Body aches   • Urinary urgency   • Hair loss      Past Medical and Surgical History:     Past Medical History:   Diagnosis Date   • Disease of thyroid gland      Past Surgical History:   Procedure Laterality Date   • FL INJECTION RIGHT SHOULDER (ARTHROGRAM)  11/18/2019   • JOINT REPLACEMENT     • KNEE SURGERY Left 2021   • SHOULDER SURGERY Right 2012    orif      Family History:     Family History   Problem Relation Age of Onset   • Polymyalgia rheumatica Mother    • Diabetes Father    • Diabetes Sister       Social History:     Social History     Socioeconomic History   • Marital status: Single     Spouse name: None   • Number of children: None   • Years of education: None   • Highest education level: None   Occupational History   • None   Tobacco Use   • Smoking status: Former     Types: Cigarettes     Quit date:      Years since quittin 1   • Smokeless tobacco: Never   Substance and Sexual Activity   • Alcohol use: Yes     Alcohol/week: 1 0 standard drink     Types: 1 Glasses of wine per week   • Drug use: Never   • Sexual activity: None   Other Topics Concern   • None   Social History Narrative   • None     Social Determinants of Health     Financial Resource Strain: Low Risk    • Difficulty of Paying Living Expenses: Not hard at all   Food Insecurity: Not on file   Transportation Needs: No Transportation Needs   • Lack of Transportation (Medical): No   • Lack of Transportation (Non-Medical):  No   Physical Activity: Not on file   Stress: Not on file   Social Connections: Not on file   Intimate Partner Violence: Not on file   Housing Stability: Not on file      Medications and Allergies:     Current Outpatient Medications   Medication Sig Dispense Refill   • acyclovir (ZOVIRAX) 400 MG tablet Take 1 tablet (400 mg total) by mouth 3 (three) times a day for 7 days 21 tablet 0   • acyclovir (ZOVIRAX) 400 MG tablet      • Azelastine HCl 137 MCG/SPRAY SOLN 1 SPRAY DAILY TO BOTH NOSTRILS     • ciprofloxacin-dexamethasone (CIPRODEX) otic suspension Administer 4 drops into both ears 2 (two) times a day 7 5 mL 0   • clobetasol (TEMOVATE) 0 05 % ointment Apply topically 2 (two) times a day 30 g 0   • COVID-19 At Home Antigen Test (BinaxNOW COVID-19 Ag Home Test) KIT 1 Units into each nostril in the morning 5 kit 1   • COVID-19 At Home Antigen Test (BinaxNOW COVID-19 Ag Home Test) KIT 1 Device into each nostril in the morning 5 kit 1   • hydrOXYzine HCL (ATARAX) 10 mg tablet Take 1 tablet (10 mg total) by mouth every 6 (six) hours as needed for itching 30 tablet 0   • levothyroxine 75 mcg tablet TAKE 1 TABLET (75 MCG TOTAL) BY MOUTH DAILY IN THE EARLY MORNING 90 tablet 1   • mirtazapine (REMERON) 15 mg tablet TAKE 1 TABLET BY MOUTH DAILY AT BEDTIME 90 tablet 1   • mupirocin (BACTROBAN) 2 % ointment Apply topically 3 (three) times a day 22 g 0   • NON FORMULARY wobenzyme     • PreviDent 5000 Booster Plus 1 1 % PSTE USE ONCE A DAY AT BEDTIME       No current facility-administered medications for this visit  No Known Allergies   Immunizations:     Immunization History   Administered Date(s) Administered   • COVID-19 Pfizer Vac BIVALENT Dominic-sucrose 12 Yr+ IM (BOOSTER ONLY) 10/14/2022   • INFLUENZA 10/14/2022   • Influenza, high dose seasonal 0 7 mL 10/10/2012, 11/18/2013, 10/29/2014, 10/21/2015, 12/05/2016, 10/14/2022   • Pneumococcal Conjugate 13-Valent 02/06/2017   • Pneumococcal Polysaccharide PPV23 01/23/2018   • Tdap 09/10/2021   • influenza, trivalent, adjuvanted 10/18/2019      Health Maintenance:         Topic Date Due   • Hepatitis C Screening  Never done   • Breast Cancer Screening: Mammogram  Never done   • Colorectal Cancer Screening  Never done         Topic Date Due   • COVID-19 Vaccine (1) 1945      Medicare Screening Tests and Risk Assessments:     Nelia Escobar is here for her Subsequent Wellness visit  Last Medicare Wellness visit information reviewed, patient interviewed and updates made to the record today  Health Risk Assessment:   Patient rates overall health as fair  Patient feels that their physical health rating is slightly worse  Patient is satisfied with their life  Eyesight was rated as same  Hearing was rated as same  Patient feels that their emotional and mental health rating is same  Patients states they are sometimes angry  Patient states they are sometimes unusually tired/fatigued   Pain experienced in the last 7 days has been some  Patient's pain rating has been 5/10  Patient states that she has experienced no weight loss or gain in last 6 months  Depression Screening:   PHQ-9 Score: 0      Fall Risk Screening: In the past year, patient has experienced: no history of falling in past year      Urinary Incontinence Screening:   Patient has not leaked urine accidently in the last six months  Home Safety:  Patient does not have trouble with stairs inside or outside of their home  Patient has working smoke alarms and has working carbon monoxide detector  Home safety hazards include: none  Nutrition:   Current diet is Regular  Medications:   Patient is currently taking over-the-counter supplements  OTC medications include: see medication list  Patient is able to manage medications  Activities of Daily Living (ADLs)/Instrumental Activities of Daily Living (IADLs):   Walk and transfer into and out of bed and chair?: Yes  Dress and groom yourself?: Yes    Bathe or shower yourself?: Yes    Feed yourself? Yes  Do your laundry/housekeeping?: Yes  Manage your money, pay your bills and track your expenses?: Yes  Make your own meals?: Yes    Do your own shopping?: Yes    Previous Hospitalizations:   Any hospitalizations or ED visits within the last 12 months?: No      Advance Care Planning:   Living will: No    Durable POA for healthcare: No    Advanced directive: No    Advanced directive counseling given: Yes    Five wishes given: Yes    Patient declined ACP directive: No    End of Life Decisions reviewed with patient: Yes    Provider agrees with end of life decisions: Yes      Comments: Patient would like to think through her choices       Cognitive Screening:   Provider or family/friend/caregiver concerned regarding cognition?: No    PREVENTIVE SCREENINGS      Cardiovascular Screening:    General: Screening Current      Diabetes Screening:     General: Screening Current      Colorectal Cancer Screening:     General: Screening Current      Breast Cancer Screening:     General: Risks and Benefits Discussed      Cervical Cancer Screening:    General: Screening Not Indicated      Osteoporosis Screening:    General: Risks and Benefits Discussed      Abdominal Aortic Aneurysm (AAA) Screening:        General: Patient Declines      Lung Cancer Screening:     General: Screening Not Indicated      Hepatitis C Screening:    General: Screening Current    Screening, Brief Intervention, and Referral to Treatment (SBIRT)    Screening  Typical number of drinks in a day: 0  Typical number of drinks in a week: 0  Interpretation: Low risk drinking behavior  Single Item Drug Screening:  How often have you used an illegal drug (including marijuana) or a prescription medication for non-medical reasons in the past year? never    Single Item Drug Screen Score: 0  Interpretation: Negative screen for possible drug use disorder    Brief Intervention  Alcohol & drug use screenings were reviewed  No concerns regarding substance use disorder identified  Time Spent  Time spent providing alcohol/substance abuse assessment and intervention services: 35 minutes    Other Counseling Topics:   Car/seat belt/driving safety, skin self-exam and calcium and vitamin D intake  No results found  Physical Exam:     /64   Pulse 74   Ht 5' 5 5" (1 664 m)   Wt 68 9 kg (152 lb)   SpO2 97%   BMI 24 91 kg/m²     Physical Exam  Vitals and nursing note reviewed  Constitutional:       General: She is not in acute distress  Appearance: Normal appearance  She is well-developed and normal weight  She is not ill-appearing  HENT:      Head: Normocephalic and atraumatic  Right Ear: Tympanic membrane, ear canal and external ear normal  There is no impacted cerumen  Left Ear: Tympanic membrane, ear canal and external ear normal  There is no impacted cerumen  Nose: Nose normal  No congestion        Mouth/Throat: Mouth: Mucous membranes are moist       Pharynx: No posterior oropharyngeal erythema  Eyes:      Extraocular Movements: Extraocular movements intact  Conjunctiva/sclera: Conjunctivae normal       Pupils: Pupils are equal, round, and reactive to light  Cardiovascular:      Rate and Rhythm: Normal rate and regular rhythm  Heart sounds: No murmur heard  Pulmonary:      Effort: Pulmonary effort is normal  No respiratory distress  Breath sounds: Normal breath sounds  No wheezing  Abdominal:      Palpations: Abdomen is soft  Tenderness: There is no abdominal tenderness  Musculoskeletal:         General: No swelling  Cervical back: Normal range of motion and neck supple  Right lower leg: No edema  Left lower leg: No edema  Skin:     General: Skin is warm and dry  Capillary Refill: Capillary refill takes less than 2 seconds  Neurological:      General: No focal deficit present  Mental Status: She is alert     Psychiatric:         Mood and Affect: Mood normal          Behavior: Behavior normal           MARTIN Padilla

## 2023-02-15 NOTE — ASSESSMENT & PLAN NOTE
Make appointment with dermatology, patient was originally referred by endocrine due to possibility of discoid lupus  Hair loss is concentrated on the right side of the scalp on the top

## 2023-02-16 ENCOUNTER — TELEPHONE (OUTPATIENT)
Dept: FAMILY MEDICINE CLINIC | Facility: CLINIC | Age: 78
End: 2023-02-16

## 2023-02-16 NOTE — TELEPHONE ENCOUNTER
Patient is asking for help with scheduling her Dexa scan  Call Dixie before scheduling       Thank you

## 2023-02-20 ENCOUNTER — HOSPITAL ENCOUNTER (OUTPATIENT)
Dept: BONE DENSITY | Facility: CLINIC | Age: 78
Discharge: HOME | End: 2023-02-20

## 2023-02-20 VITALS — WEIGHT: 152 LBS | BODY MASS INDEX: 25.33 KG/M2 | HEIGHT: 65 IN

## 2023-02-20 DIAGNOSIS — E28.39 OVARIAN FAILURE DUE TO MENOPAUSE: ICD-10-CM

## 2023-03-02 ENCOUNTER — TELEPHONE (OUTPATIENT)
Dept: FAMILY MEDICINE CLINIC | Facility: CLINIC | Age: 78
End: 2023-03-02

## 2023-03-07 ENCOUNTER — TELEPHONE (OUTPATIENT)
Dept: DERMATOLOGY | Facility: CLINIC | Age: 78
End: 2023-03-07

## 2023-03-07 NOTE — TELEPHONE ENCOUNTER
----- Message from Felix Sheehan MD sent at 3/6/2023  9:51 PM EST -----  Yes, we should be able to get her in sooner  Majo, she has a referral  Can you please put in [facility]? Or open up a 7 day spot with me  Thanks!    ----- Message -----  From: MARTIN Martinez  Sent: 3/3/2023   1:11 PM EST  To: Felix Sheehan MD    Good afternoon Dr Concha Corona,    I spoke with this patient today and she is in a bit of distress  I referred her to you as she has been loosing patches of hair and her rheumatologist recommended that she see dermatology for full work up of discoid psoriasis  Scheduling told her there is no way to get her in into July  Is there any chance that you have any openings or could suggest another dermatologist that she would be able to see sooner  Thank you for your time      Sincerely,  Kasia Rodriguez

## 2023-03-07 NOTE — TELEPHONE ENCOUNTER
Called patient twice, left voicemail regarding getting her in an appt with Dr Kulwinder Pelaez sooner in a 7 day spot  Informed pt to call back and we can help schedule her

## 2023-03-08 ENCOUNTER — CONSULT (OUTPATIENT)
Dept: DERMATOLOGY | Facility: CLINIC | Age: 78
End: 2023-03-08

## 2023-03-08 VITALS — WEIGHT: 145 LBS | BODY MASS INDEX: 23.3 KG/M2 | TEMPERATURE: 96.7 F | HEIGHT: 66 IN

## 2023-03-08 DIAGNOSIS — Z85.828 HISTORY OF BASAL CELL CARCINOMA: ICD-10-CM

## 2023-03-08 DIAGNOSIS — L82.1 SEBORRHEIC KERATOSIS: ICD-10-CM

## 2023-03-08 DIAGNOSIS — B07.9 VERRUCA VULGARIS: ICD-10-CM

## 2023-03-08 DIAGNOSIS — L85.3 XEROSIS OF SKIN: ICD-10-CM

## 2023-03-08 DIAGNOSIS — D22.9 MULTIPLE MELANOCYTIC NEVI: ICD-10-CM

## 2023-03-08 DIAGNOSIS — R21 RASH: Primary | ICD-10-CM

## 2023-03-08 DIAGNOSIS — L29.9 PRURITUS: ICD-10-CM

## 2023-03-08 DIAGNOSIS — D18.01 CHERRY ANGIOMA: ICD-10-CM

## 2023-03-08 DIAGNOSIS — L81.4 LENTIGO: ICD-10-CM

## 2023-03-08 DIAGNOSIS — L65.9 HAIR LOSS: ICD-10-CM

## 2023-03-08 NOTE — PROGRESS NOTES
[facility] Note     Patient Name: Corazon Garcia  Encounter Date: 3/8/2023     Have you been cared for by a [place] [id number] Dermatologist in the last 3 years and, if so, which description applies to you? NO  I am considered a "new" patient and must complete all patient intake questions  I am FEMALE/of child-bearing potential     REVIEW OF SYSTEMS:  Have you recently had or currently have any of the following? · Recent fever or chills? No  · Any non-healing wound? No  · Are you pregnant or planning to become pregnant? No  · Are you currently or planning to be nursing or breast feeding? No   PAST MEDICAL HISTORY:  Have you personally ever had or currently have any of the following? If "YES," then please provide more detail  · Skin cancer (such as Melanoma, Basal Cell Carcinoma, Squamous Cell Carcinoma? No  · Tuberculosis, HIV/AIDS, Hepatitis B or C: No  · Systemic Immunosuppression such as Diabetes, Biologic or Immunotherapy, Chemotherapy, Organ Transplantation, Bone Marrow Transplantation No  · Radiation Treatment No   FAMILY HISTORY:  Any "first degree relatives" (parent, brother, sister, or child) with the following? • Skin Cancer, Pancreatic or Other Cancer? No   PATIENT EXPERIENCE:    • Do you want the Dermatologist to perform a COMPLETE skin exam today including a clinical examination under the "bra and underwear" areas? Yes  • If necessary, do we have your permission to call and leave a detailed message on your Preferred Phone number that includes your specific medical information?   Yes      No Known Allergies   Current Outpatient Medications:   •  acyclovir (ZOVIRAX) 400 MG tablet, Take 1 tablet (400 mg total) by mouth 3 (three) times a day for 7 days, Disp: 21 tablet, Rfl: 0  •  acyclovir (ZOVIRAX) 400 MG tablet, , Disp: , Rfl:   •  Azelastine HCl 137 MCG/SPRAY SOLN, 1 SPRAY DAILY TO BOTH NOSTRILS, Disp: , Rfl:   •  ciprofloxacin-dexamethasone (CIPRODEX) otic suspension, Administer 4 drops into both ears 2 (two) times a day, Disp: 7 5 mL, Rfl: 0  •  clobetasol (TEMOVATE) 0 05 % ointment, Apply topically 2 (two) times a day, Disp: 30 g, Rfl: 0  •  COVID-19 At Home Antigen Test (BinaxNOW COVID-19 Ag Home Test) KIT, 1 Units into each nostril in the morning, Disp: 5 kit, Rfl: 1  •  COVID-19 At Home Antigen Test (BinaxNOW COVID-19 Ag Home Test) KIT, 1 Device into each nostril in the morning, Disp: 5 kit, Rfl: 1  •  hydrOXYzine HCL (ATARAX) 10 mg tablet, Take 1 tablet (10 mg total) by mouth every 6 (six) hours as needed for itching, Disp: 30 tablet, Rfl: 0  •  levothyroxine 75 mcg tablet, TAKE 1 TABLET (75 MCG TOTAL) BY MOUTH DAILY IN THE EARLY MORNING, Disp: 90 tablet, Rfl: 1  •  mirtazapine (REMERON) 15 mg tablet, TAKE 1 TABLET BY MOUTH DAILY AT BEDTIME, Disp: 90 tablet, Rfl: 1  •  mupirocin (BACTROBAN) 2 % ointment, Apply topically 3 (three) times a day, Disp: 22 g, Rfl: 0  •  NON FORMULARY, wobenzyme, Disp: , Rfl:   •  PreviDent 5000 Booster Plus 1 1 % PSTE, USE ONCE A DAY AT BEDTIME, Disp: , Rfl:           • Whom besides the patient is providing clinical information about today's encounter?   o NO ADDITIONAL HISTORIAN (patient alone provided history)    Physical Exam and Assessment/Plan by Diagnosis:    MELANOCYTIC NEVI ("Moles")    Physical Exam:  • Anatomic Location Affected:   Mostly on sun-exposed areas of the trunk and extremities  • Morphological Description:  Scattered, 1-4mm round to ovoid, symmetrical-appearing, even bordered, skin colored to dark brown macules/papules, mostly in sun-exposed areas  • Pertinent Positives:  • Pertinent Negatives:     Additional History of Present Condition:      Assessment and Plan:  Based on a thorough discussion of this condition and the management approach to it (including a comprehensive discussion of the known risks, side effects and potential benefits of treatment), the patient (family) agrees to implement the following specific plan:  • When outside we recommend using a wide brim hat, sunglasses, long sleeve and pants, sunscreen with SPF 54+ with reapplication every 2 hours, or SPF specific clothing   • Benign, reassured  • Annual skin check     Melanocytic Nevi  Melanocytic nevi ("moles") are tan or brown, raised or flat areas of the skin which have an increased number of melanocytes  Melanocytes are the cells in our body which make pigment and account for skin color  Some moles are present at birth (I e , "congenital nevi"), while others come up later in life (i e , "acquired nevi")  The sun can stimulate the body to make more moles  Sunburns are not the only thing that triggers more moles  Chronic sun exposure can do it too  Clinically distinguishing a healthy mole from melanoma may be difficult, even for experienced dermatologists  The "ABCDE's" of moles have been suggested as a means of helping to alert a person to a suspicious mole and the possible increased risk of melanoma  The suggestions for raising alert are as follows:    Asymmetry: Healthy moles tend to be symmetric, while melanomas are often asymmetric  Asymmetry means if you draw a line through the mole, the two halves do not match in color, size, shape, or surface texture  Asymmetry can be a result of rapid enlargement of a mole, the development of a raised area on a previously flat lesion, scaling, ulceration, bleeding or scabbing within the mole  Any mole that starts to demonstrate "asymmetry" should be examined promptly by a board certified dermatologist      Border: Healthy moles tend to have discrete, even borders  The border of a melanoma often blends into the normal skin and does not sharply delineate the mole from normal skin  Any mole that starts to demonstrate "uneven borders" should be examined promptly by a board certified dermatologist      Color: Healthy moles tend to be one color throughout    Melanomas tend to be made up of different colors ranging from dark black, blue, white, or red  Any mole that demonstrates a color change should be examined promptly by a board certified dermatologist      Diameter: Healthy moles tend to be smaller than 0 6 cm in size; an exception are "congenital nevi" that can be larger  Melanomas tend to grow and can often be greater than 0 6 cm (1/4 of an inch, or the size of a pencil eraser)  This is only a guideline, and many normal moles may be larger than 0 6 cm without being unhealthy  Any mole that starts to change in size (small to bigger or bigger to smaller) should be examined promptly by a board certified dermatologist      Evolving: Healthy moles tend to "stay the same "  Melanomas may often show signs of change or evolution such as a change in size, shape, color, or elevation  Any mole that starts to itch, bleed, crust, burn, hurt, or ulcerate or demonstrate a change or evolution should be examined promptly by a board certified dermatologist         LENTIGO    Physical Exam:  • Anatomic Location Affected:  Face, trunk and bilateral arms  • Morphological Description:  Light brown macules  • Pertinent Positives:  • Pertinent Negatives: Additional History of Present Condition:      Assessment and Plan:  Based on a thorough discussion of this condition and the management approach to it (including a comprehensive discussion of the known risks, side effects and potential benefits of treatment), the patient (family) agrees to implement the following specific plan:  • When outside we recommend using a wide brim hat, sunglasses, long sleeve and pants, sunscreen with SPF 12+ with reapplication every 2 hours, or SPF specific clothing       What is a lentigo? A lentigo is a pigmented flat or slightly raised lesion with a clearly defined edge  Unlike an ephelis (freckle), it does not fade in the winter months  There are several kinds of lentigo  The name lentigo originally referred to its appearance resembling a small lentil   The plural of lentigo is lentigines, although “lentigos” is also in common use  Who gets lentigines? Lentigines can affect males and females of all ages and races  Solar lentigines are especially prevalent in fair skinned adults  Lentigines associated with syndromes are present at birth or arise during childhood  What causes lentigines? Common forms of lentigo are due to exposure to ultraviolet radiation:  • Sun damage including sunburn   • Indoor tanning   • Phototherapy, especially photochemotherapy (PUVA)    Ionizing radiation, eg radiation therapy, can also cause lentigines  Several familial syndromes associated with widespread lentigines originate from mutations in Eric-MAP kinase, mTOR signaling and PTEN pathways  What is the treatment for lentigines? Most lentigines are left alone  Attempts to lighten them may not be successful  The following approaches are used:  • SPF 50+ broad-spectrum sunscreen   • Hydroquinone bleaching cream   • Alpha hydroxy acids   • Vitamin C   • Retinoids   • Azelaic acid   • Chemical peels  Individual lesions can be permanently removed using:  • Cryotherapy   • Intense pulsed light   • Pigment lasers    How can lentigines be prevented? Lentigines associated with exposure ultraviolet radiation can be prevented by very careful sun protection  Clothing is more successful at preventing new lentigines than are sunscreens  What is the outlook for lentigines? Lentigines usually persist  They may increase in number with age and sun exposure  Some in sun-protected sites may fade and disappear  MOORE ANGIOMAS    Physical Exam:  • Anatomic Location Affected:  trunk  • Morphological Description:  Scattered cherry red, 1-4 mm papules  • Pertinent Positives:  • Pertinent Negatives:     Additional History of Present Condition:      Assessment and Plan:  Based on a thorough discussion of this condition and the management approach to it (including a comprehensive discussion of the known risks, side effects and potential benefits of treatment), the patient (family) agrees to implement the following specific plan:  • Monitor for changes  • Benign, reassured    Assessment and Plan:    Cherry angioma, also known as Roetta Bence spots, are benign vascular skin lesions  A "cherry angioma" is a firm red, blue or purple papule, 0 1-1 cm in diameter  When thrombosed, they can appear black in colour until evaluated with a dermatoscope when the red or purple colour is more easily seen  Cherry angioma may develop on any part of the body but most often appear on the scalp, face, lips and trunk  An angioma is due to proliferating endothelial cells; these are the cells that line the inside of a blood vessel  Angiomas can arise in early life or later in life; the most common type of angioma is a cherry angioma  Cherry angiomas are very common in males and females of any age or race  They are more noticeable in white skin than in skin of colour  They markedly increase in number from about the age of 36  There may be a family history of similar lesions  Eruptive cherry angiomas have been rarely reported to be associated with internal malignancy  The cause of angiomas is unknown  Genetic analysis of cherry angiomas has shown that they frequently carry specific somatic missense mutations in the GNAQ and GNA11 (Q209H) genes, which are involved in other vascular and melanocytic proliferations  SEBORRHEIC KERATOSIS; NON-INFLAMED    Physical Exam:  • Anatomic Location Affected:  trunk  • Morphological Description:  Flat and raised, waxy, smooth to warty textured, yellow to brownish-grey to dark brown to blackish, discrete, "stuck-on" appearing papules  • Pertinent Positives:  • Pertinent Negatives:     Additional History of Present Condition:      Assessment and Plan:  Based on a thorough discussion of this condition and the management approach to it (including a comprehensive discussion of the known risks, side effects and potential benefits of treatment), the patient (family) agrees to implement the following specific plan:  • Monitor for changes  • Benign, reassured    Seborrheic Keratosis  A seborrheic keratosis is a harmless warty spot that appears during adult life as a common sign of skin aging  Seborrheic keratoses can arise on any area of skin, covered or uncovered, with the usual exception of the palms and soles  They do not arise from mucous membranes  Seborrheic keratoses can have highly variable appearance  Seborrheic keratoses are extremely common  It has been estimated that over 90% of adults over the age of 61 years have one or more of them  They occur in males and females of all races, typically beginning to erupt in the 35s or 45s  They are uncommon under the age of 21 years  The precise cause of seborrhoeic keratoses is not known  Seborrhoeic keratoses are considered degenerative in nature  As time goes by, seborrheic keratoses tend to become more numerous  Some people inherit a tendency to develop a very large number of them; some people may have hundreds of them  There is no easy way to remove multiple lesions on a single occasion  Unless a specific lesion is "inflamed" and is causing pain or stinging/burning or is bleeding, most insurance companies do not authorize treatment  XEROSIS ("DRY SKIN")    Physical Exam:  • Anatomic Location Affected:  diffuse  • Morphological Description:  xerosis  • Pertinent Positives:  • Pertinent Negatives:     Additional History of Present Condition:      Assessment and Plan:  Based on a thorough discussion of this condition and the management approach to it (including a comprehensive discussion of the known risks, side effects and potential benefits of treatment), the patient (family) agrees to implement the following specific plan:  • Use moisturizer like Eucerin,Cerave or Aveeno Cream 3 times a day for the dry skin   •   •    •     Dry skin refers to skin that feels dry to touch  Dry skin has a dull surface with a rough, scaly quality  The skin is less pliable and cracked  When dryness is severe, the skin may become inflamed and fissured  Although any body site can be dry, dry skin tends to affect the shins more than any other site  Dry skin is lacking moisture in the outer horny cell layer (stratum corneum) and this results in cracks in the skin surface  Dry skin is also called xerosis, xeroderma or asteatosis (lack of fat)  It can affect males and females of all ages  There is some racial variability in water and lipid content of the skin  • Dry skin that starts in early childhood may be one of about 20 types of ichthyosis (fish-scale skin)  There is often a family history of dry skin  • Dry skin is commonly seen in people with atopic dermatitis  • Nearly everyone > 60 years has dry skin  Dry skin that begins later may be seen in people with certain diseases and conditions  • Postmenopausal women  • Hypothyroidism  • Chronic renal disease   • Malnutrition and weight loss   • Subclinical dermatitis   • Treatment with certain drugs such as oral retinoids, diuretics and epidermal growth factor receptor inhibitors      What is the treatment for dry skin? The mainstay of treatment of dry skin and ichthyosis is moisturisers/emollients  They should be applied liberally and often enough to:  • Reduce itch   • Improve the barrier function   • Prevent entry of irritants, bacteria   • Reduce transepidermal water loss  How can dry skin be prevented? Eliminate aggravating factors:  • Reduce the frequency of bathing  • A humidifier in winter and air conditioner in summer   • Compare having a short shower with a prolonged soak in a bath  • Use lukewarm, not hot, water     • Replace standard soap with a substitute such as a synthetic detergent cleanser, water-miscible emollient, bath oil, anti-pruritic tar oil, colloidal oatmeal etc  • Apply an emollient liberally and often, particularly shortly after bathing, and when itchy  The drier the skin, the thicker this should be, especially on the hands  What is the outlook for dry skin? A tendency to dry skin may persist life-long, or it may improve once contributing factors are controlled  RASH    Physical Exam:  • (Anatomic Location); (Size and Morphological Description); (Differential Diagnosis):  o Top of scalp, right temporal scalp with scarring alopecia and atrophic plaque; Diff: secondary to trichotillomania vs burnt out discoid lupus  • Pertinent Positives:  • Pertinent Negatives: Additional History of Present Condition:  Patient reports patches of hair loss since a young child  Patient also reports as a young teenager pulling hair out when she would read  Patient had blood work done and was told she may possibly have lupus  Assessment and Plan:  Based on a thorough discussion of this condition and the management approach to it (including a comprehensive discussion of the known risks, side effects and potential benefits of treatment), the patient (family) agrees to implement the following specific plan:  • Discussed with patient that scarring alopecia means the hair will not grow back in those areas  • Punch biopsies were discussed with patient today  It was advised that the biopsies will not change the course of treatment as there is no treatment available for this type of hair loss  Biopsy would just confirm diagnosis  Patient deferred at this time  You can try over the counter rogaine (minoxidil) 5% foam  Use one cap full a day on dry hair, part hair and apply directly to scalp  Do not do too close to bedtime  Need to do this daily  If you stop abruptly, you will lose hair       HISTORY OF BASAL CELL CARCINOMA    Physical Exam:  • Anatomic Location Affected:  Back  • Morphological Description of scar:  Well healed scar  • Suspected Recurrence: No  • Pertinent Positives:  • Pertinent Negatives: Additional History of Present Condition:  History of basal cell carcinoma with no sign of recurrence    Assessment and Plan:  Based on a thorough discussion of this condition and the management approach to it (including a comprehensive discussion of the known risks, side effects and potential benefits of treatment), the patient (family) agrees to implement the following specific plan:  • When outside we recommend using a wide brim hat, sunglasses, long sleeve and pants, sunscreen with SPF 40+ with reapplication every 2 hours, or SPF specific clothing     How can basal cell carcinoma be prevented? The most important way to prevent BCC is to avoid sunburn  This is especially important in childhood and early life  Fair skinned individuals and those with a personal or family history of BCC should protect their skin from sun exposure daily, year-round and lifelong  • Stay indoors or under the shade in the middle of the day   • Wear covering clothing   • Apply high protection factor SPF50+ broad-spectrum sunscreens generously to exposed skin if outdoors   • Avoid indoor tanning (sun beds, solaria)  • Oral nicotinamide (vitamin B3) in a dose of 500 mg twice daily may reduce the number and severity of BCCs  What is the outlook for basal cell carcinoma? Most BCCs are cured by treatment  Cure is most likely if treatment is undertaken when the lesion is small  About 50% of people with BCC develop a second one within 3 years of the first  They are also at increased risk of other skin cancers, especially melanoma  Regular self-skin examinations and long-term annual skin checks by an experienced health professional are recommended  PRURITUS    Physical Exam:  • Anatomic Location Affected:  Mid back  • Morphological Description:  No skin findings on exam today  • Pertinent Positives:  • Pertinent Negatives:     Additional History of Present Condition:  Patient reports a itchy area that she cannot see  Patient uses a back scratcher  Patient also prescribed Atarax as needed  Assessment and Plan:  Based on a thorough discussion of this condition and the management approach to it (including a comprehensive discussion of the known risks, side effects and potential benefits of treatment), the patient (family) agrees to implement the following specific plan:  • Recommend supportive care such as ice packs  • You can continue to use Atarax as prescribed  Advised that this medication will cause drowsiness  • Recommend that you do not scratch area on back with the back scratcher your are currently using  What is pruritus? Pruritus is the medical term for itch  Itch is an unpleasant sensation on the skin that provokes the desire to rub or scratch the area to obtain relief  Itch can cause discomfort and frustration; in severe cases it can lead to disturbed sleep, anxiety and depression  Constant scratching to obtain relief can damage the skin (excoriation, lichenification) and reduce its effectiveness as a major protective barrier  Pruritus is often a symptom of an underlying disease process such as a skin problem, a systemic disease, or abnormal nerve impulses  What are skin signs of pruritus? There are no specific skin signs associated with pruritus, apart from scratch marks (excoriations) and signs of the underlying condition  Persistent scratching over a period of time may lead to:  • Lichenification (thickened skin, lichen simplex)   • Prurigo papules and nodules  Who gets pruritus? The epidemiology of pruritus depends on its underlying cause or causes  However, in general, the incidence of chronic pruritus increases with age, it is more common in women, and in those of  background       Mechanisms underlying pruritus  Itch, like pain, can originate anywhere along the neural itch pathway, from the central nervous system (brain and spinal cord) to the peripheral nervous system and the skin  Mechanisms underlying pruritus are complex  • The itch signal is transmitted mainly through small, itch-selective C-fibers in the skin in addition to histamine-triggered and non-histaminergic neurons  • These connect with secondary neurons which cross the opposite side of the spinothalamic tract and ascend to parts of the brain involved in sensation, emotion, reward and memory  These areas overlap with those activated by pain  • Patients with chronic pruritus usually have both peripheral and central hypersensitization (heightened reaction) which means they tend to overreact to noxious stimuli which normally inhibit itch (such as heat and scratching) and also misinterpret non-noxious stimuli as an itch (eg, light touch)    The way scratching stops itching has been explained by an interaction with pain pathways within the dorsal horn of the spinal cord  Localized pruritus  Localized pruritus is pruritus that is confined to a certain part of the body  It can occur in association with a primary rash (eg dermatitis) or may occur because of hypersensitive nerves in the skin (neuropathic pruritus)  Neuropathic pruritus is due to compression or degeneration of nerves in the skin, on route to the spine or in the spine itself  Neuropathic itch is sometimes associated with reduced or absent sweating in the affected area of skin    Typical causes of localized itchy rashes  • Scalp: seborrhoeic dermatitis, head lice   • Back: [place] disease   • Hands: pompholyx, irritant and/or allergic contact dermatitis   • Genitals: vulvovaginal Candida albicans infection, lichen sclerosus   • Legs: venous eczema   • Feet: tinea pedis    Neuropathic causes of localized pruritus without primary rash  • Face: trigeminal trophic syndrome   • Hand: cheiralgia paraesthetica   • Arm: brachioradial pruritus   • Back: notalgia paraesthetica/topics/brachioradial-pruritus/   • Genital: pruritus vulvae, pruritus ani • Dermatomal: herpes zoster (shingles) during recovery phase  Scratching a localised itch may lead to lichen simplex, prurigo or prurigo nodularis  Systemic causes of pruritus  Sytemic diseases may cause generalised pruritus  This is sometimes called metabolic itch  There is nothing wrong with the skin itself, at least until it's been scratched  Metabolic disorders include chronic renal failure (dialysis) and liver disease (with or without cholestasis)  Uraemic pruritus arises in patients undergoing dialysis is due to a combination of xerosis (dry skin), secondary hyperparathyroidism, peripheral neuropathy (nerve changes) and inflammation  • Secondary hyperparathyroidism which also occurs in dialysis patients leads to microprecipitation (deposition) of calcium and magnesium salts in the skin, triggering mast cell degeneration, releasing serotonin and histamine  • Once chronic pruritus has occurred, there may be secondary changes in the nerves in the skin and central nervous system which heighten the sensation of itch  Hepatogenic pruritus is more common in intrahepatic than extrahepatic cholestasis  Examples of intrahepatic cholestasis is associated with chronic viral hepatitis, primary biliary cirrhosis, pregnancy-related cholestasis  Extra-hepatic cholestasis is associated with pressure on the bile ducts, eg from pancreatic tumours or pseudocysts  • Cholestasis is thought to release toxic substances from the liver, which stimulates neural itch fibres in the skin  • Characteristically, cholestatic pruritus is most severe at night; it tends to affect the hands, feet and areas where clothes are rubbing on the skin  Haematological disorders include iron deficiency anaemia and polycythaemia vera  • Generalized pruritus along with glossitis (tongue inflammation) and angular cheilitis (inflammation of mouth corners) are seen in iron deficiency anaemia     • In polycythaemia vera, itch is usually precipitated by contact with water (aquagenic pruritus), eg after a shower  This is thought to be mediated by the effect of platelets, serotonin and prostaglandins  Endocrine disorders include thyroid disease and diabetes mellitus  • In Graves' disease (thyrotoxicosis), increased blood flow, skin temperature and decreased itch threshold mediated by the increase in thyroid hormones, lead to the itch  Pruritus associated with myxoedema and hypothyroidism is rare, and if present, is more likely the result of xerosis (dry skin)  • In diabetes mellitus, localized itch tends to occur in the perianal/genital region usually due to Candida albicans or dermatophyte infections  It is unclear if metabolic abnormalities such as renal impairment, autonomic failure or diabetic neuropathy contribute to this  Paraneoplastic itch is associated with lymphoma, especially Hodgkin lymphoma, leukemia or a solid organ tumor (eg lung, colon, brain)  • In Hodgkin lymphoma, pruritus is thought to be caused by histamine release, which may be related to eosinophilia  Infections causing itch include human immunodeficiency virus infection (HIV) and hepatitis C virus  • Patients with HIV commonly complain of itch  This may be associated with skin infections/infestations, dry skin, drug reactions, hyperoesinophilia (increased eosinophil levels) and cutaneous T cell lymphoma  There is a possible correlation between intractable pruritus and increased HIV viral load  • In chronic hepatitis C infection, the mechanisms responsible for itch remain unclear  In the absence of cholestasis, pruritus may be related to antiviral therapy; it has been noted to occur in patients treated with combination therapy (interferon tavares and ribavirin)  Pruritic skin diseases  Pruritus is often a symptom of many skin diseases   Some of these are included in the following list   • Allergic contact dermatitis   • Dry skin   • Urticaria   • Psoriasis • Atopic dermatitis   • Folliculitis   • Dermatitis herpetiformis   • Lichen simplex   • Lichen planus   • Bullous pemphigoid   • Lice   • Scabies   • Miliaria   • Sunburn   • Pityriasis rosea   • Mycosis fungoides    Exposure-related pruritus  Pruritus may arise as a result of exposure to certain external factors  • Allergens or irritants   • Cold, which can cause 'winter itch'   • A physical urticaria, such as dermographism   • Aquagenic pruritus (itch on exposure to water)   • Insects and infestations, eg scabies   • Medications (topical or systemic) eg opioids, aspirin    Hormonal reasons for pruritus  About 2% of pregnant women have pruritus without any obvious dermatological cause  In some cases the itch is due to cholestasis (pooling of bile in the gall bladder and liver)  It usually occurs in the 3rd trimester and is relieved after giving birth  Generalized itch is also a common symptom of menopause  How is pruritus diagnosed? The first steps of evaluation of an itchy patient are medical history and examination  A thorough history can identify constitutional symptoms that may point towards an underlying systemic disease  Drug triggers such as opioids may be identified, especially if the commencement of the drug relates to the itch  A careful examination can identify dermatological causes for the itch (eg scabies, lichen simplex, pemphigoid) or evidence of chronic skin changes related to the itch  In dermatological causes of pruritus, primary skin lesions will usually suggest the diagnosis  Patients without primary skin lesions and little evidence of chronic scratching should be investigated for systemic, neuropathic and psychogenic causes    The panel of investigations could include:  • Full/complete blood count   • Creatinine and renal function tests   • Liver function tests   • Thyroid function tests   • Erythrocyte sedimentation rate   • Chest radiography   • HIV serology    What treatment is available for itch? The management of pruritus relies on establishing the cause and then either removing or treating the cause to prevent further itching  In many cases, tests are necessary to determine the cause; while these are in progress, treatment to provide symptomatic relief of pruritus may be given  Topical treatments  In addition to specific therapy for any underlying skin or internal disease, topical treatment may include:  • Wet dressings or tepid shower to cool the skin   • Calamine lotion (contains phenol, which cools the skin): avoid on dry skin and limit use to a few days   • Menthol/camphor lotion: gives a chilling sensation   • Local anesthetics, such as pramoxine (also called pramocaine), applied to small itchy spots such as insect bites   • Regular use of emollients, especially if skin is dry   • Mild topical corticosteroids for short periods   • Topical calcineurin inhibitors are also used to reduce itch associated with inflammatory skin conditions   • Topical doxepin, a tricyclic antidepressant and antihistamine, is an antipruritic used in eczema  Other measures that can be useful in preventing pruritus include avoiding precipitating factors such as rough clothing or fabrics, overheating, and vasodilators if they provoke itching (eg, caffeine, alcohol, spices)  Fingernails should be kept short and clean  If the urge to scratch is irresistible then rub the area with your palm  Topical antihistamines should not be used for chronic itch, as they may sensitize the skin and result in allergic contact dermatitis  Systemic therapy  If pruritus is severe and sleep is disturbed, then treatment with oral medication may be necessary  Some drugs may help to relieve the itch whilst others are given solely for their sedative effects  • Antihistamines are most useful in urticaria, in which histamine is released  Use for other pruritic conditions is not supported by randomized control trials  Sedating antihistamines may be used for their sedative effects  • Doxepin and amitriptyline are tricyclic antidepressants have antipruritic action and act on the central and peripheral nervous systems  • Tetracyclic antidepressants such as mirtazepine and selective serotonin reuptake inhibitors (paroxetine, sertraline, fluoxetine) may also help some patients with severe itch including when it is caused by cholestasis, T-cell lymphoma, malignancy or a neuropathic condition  • Anti-epileptic drugs such as sodium valproate, gabapentin and pregabalin may also be of benefit to some patients, e g , those with itch associated with renal failure or neuropathic itch  The mechanism of action is uncertain  • Opioid antagonists such as butorphanol intranasal spray, naltrexone tablets, and naloxone have been effective in patients suffering from intractable pruritus in association with liver disease, atopic eczema and chronic urticaria  Nalfurafine, which is a kappa-opioid agonist has also been studied and shown to reduce itch associated with chronic renal impairment, however it is not widely available  • Aspirin is sometimes effective if pruritus is mediated by kinins or prostaglandins and is noted to be effective in patients with pruritus due to polycythaemia vera  Note: aspirin may cause or aggravate itch in some patients  • Thalidomide has been successful in treating nodular prurigo and chronic pruritus of various kinds but is rarely used because of serious adverse effects and expense  • Rifampicin is effective for patients with pruritus associated with cholestasis (some forms of liver disease)  Isolated case reports in severe itch associated with malignancy have reported success with the NKR1 antagonist, aprepitant (normally used short-term for postoperative or chemotherapy-induced nausea)  This is under investigation for neuropathic itch and nodular prurigo      Phototherapy  Broadband ultraviolet B or narrow-band UVB phototherapy alone, or in conjunction with UVA, has been shown to be helpful for pruritus associated with chronic kidney disease, psoriasis, atopic eczema and cutaneous T-cell lymphoma  Behavioral therapy  Behavioral therapy may be used in conjunction with pharmacotherapy to modify behaviours such as coping mechanisms and stress reduction, which help interrupt the itch-scratch cycle  One randomized controlled trial showed short-term benefits in reduction in itch frequency and scratching as well as improvement in coping mechanisms  What is the outcome for pruritus? The management of chronic severe itch is difficult and often requires the use of combination therapy over a long period of time  Identification and treatment of underlying conditions causing pruritus may help in this process  The symptom may quickly disappear or persist for long periods of time  VERRUCA VULGARIS ("Common Warts")    Physical Exam:  • Anatomic Location Affected:  Left third finger  • Morphological Description:  Verrucous papule  • Pertinent Positives:  • Pertinent Negatives: Additional History of Present Condition:  Found on exam    Assessment and Plan:  Based on a thorough discussion of this condition and the management approach to it (including a comprehensive discussion of the known risks, side effects and potential benefits of treatment), the patient (family) agrees to implement the following specific plan:  • Can continue the over the counter treatment you have been using  Verruca Vulgaris  A verruca is a common growth of the skin caused by infection by human papilloma virus (HPV)  There are many strains of the virus that cause different types of warts on the body  The virus infects the most superficial layers of the skin, causing increased production of skin cells and thickening   Warts can be spread through direct contact with infected skin and may spread to other parts of the body if scratched or picked  A verruca is more commonly called a "wart " Warts are particularly common in school-aged children but can arise at any age  Patients who have a history of eczema are especially prone due to impaired skin barrier  Those taking immunosuppressive drugs or with HIV infections may experience prolonged symptoms despite treatment  Warts generally have a rough surface with a tiny black dot sometimes observed in the middle of each scaly spot  They can range in size from a small bump to large scaly growths  Common warts are often found on the backs of fingers or toes, around the nails, and on the knees  Plantar warts can grow inwardly on the soles of the feet causing pain  There are many possible ways to treat warts and sometimes several different treatments are needed to get the warts to go away completely  There is no single perfect treatment for warts, and successful treatment can take many months  In-office treatments usually require multiple visits, and include:  1) Cryotherapy  a cold spray with liquid nitrogen will destroy the infected cells but may lead to discomfort and blistering  It may also leave a permanent white hilario or scar  2) Electrosurgery (curettage and cautery) can be used for large resistant warts which involves shaving the growth down and burning the base  It is performed under local anesthesia and may leave a permanent scar    3) Candida (“yeast”) antigen injections  These are extracts of the common yeast (Candida) that cannot cause an infection  The medication is injected into/under the wart  It is thought to stimulate the immune system to recognize the wart virus and attack it  Multiple injections are often needed about one month apart  There are also several at-home wart treatments:    1) Soak the warts in warm water for 5 minutes every night followed by gentle filing with a nail file or pumice stone      2) Topical salicylic acid or similar compounds work by removing the dead surface skin cells  a  Apply the medicine directly to the wart, wait for it to dry completely, then cover with duct tape overnight   b  Repeat until the wart is gone, which can take 2-4 months  c  Do not use on the face or groin area   d  If the wart paint makes the skin sore, stop treatment until the discomfort has settled, then recommence as above   e  Take care to keep the chemical off normal skin  3) Podophyllin is a cytotoxic agent used in some products and must not be used in pregnancy or women considering pregnancy  4) Some prescription medications include   a  Topical retinoids (adapalene, tretinoin, tazarotene), 5-fluorouracil (Efudex) or imiquimod (Aldara) creams are sometimes used to treat flat warts or warts on the face and other sensitive anatomical areas  They are usually applied directly to the warts once a day for 2-4 months and can be irritating  These treatments should only be used as directed by your health care provider  b  Systemic treatment with oral cimetidine (Tagamet) may help boost the immune system against the wart virus in patients, some of the time  Initiation of cimetidine therapy should ONLY be done under the supervision of your health care provider, who can discuss possible side effects and drug-to-drug interactions of this specific treatment                     Scribe Attestation    I,:  Miguel Pandey am acting as a scribe while in the presence of the attending physician :       I,:  Jose Alfredo Gordon MD personally performed the services described in this documentation    as scribed in my presence :

## 2023-03-08 NOTE — PATIENT INSTRUCTIONS
MELANOCYTIC NEVI ("Moles")    Assessment and Plan:  Based on a thorough discussion of this condition and the management approach to it (including a comprehensive discussion of the known risks, side effects and potential benefits of treatment), the patient (family) agrees to implement the following specific plan:  When outside we recommend using a wide brim hat, sunglasses, long sleeve and pants, sunscreen with SPF 00+ with reapplication every 2 hours, or SPF specific clothing   Benign, reassured  Annual skin check     Melanocytic Nevi  Melanocytic nevi ("moles") are tan or brown, raised or flat areas of the skin which have an increased number of melanocytes  Melanocytes are the cells in our body which make pigment and account for skin color  Some moles are present at birth (I e , "congenital nevi"), while others come up later in life (i e , "acquired nevi")  The sun can stimulate the body to make more moles  Sunburns are not the only thing that triggers more moles  Chronic sun exposure can do it too  Clinically distinguishing a healthy mole from melanoma may be difficult, even for experienced dermatologists  The "ABCDE's" of moles have been suggested as a means of helping to alert a person to a suspicious mole and the possible increased risk of melanoma  The suggestions for raising alert are as follows:    Asymmetry: Healthy moles tend to be symmetric, while melanomas are often asymmetric  Asymmetry means if you draw a line through the mole, the two halves do not match in color, size, shape, or surface texture  Asymmetry can be a result of rapid enlargement of a mole, the development of a raised area on a previously flat lesion, scaling, ulceration, bleeding or scabbing within the mole  Any mole that starts to demonstrate "asymmetry" should be examined promptly by a board certified dermatologist      Border: Healthy moles tend to have discrete, even borders    The border of a melanoma often blends into the normal skin and does not sharply delineate the mole from normal skin  Any mole that starts to demonstrate "uneven borders" should be examined promptly by a board certified dermatologist      Color: Healthy moles tend to be one color throughout  Melanomas tend to be made up of different colors ranging from dark black, blue, white, or red  Any mole that demonstrates a color change should be examined promptly by a board certified dermatologist      Diameter: Healthy moles tend to be smaller than 0 6 cm in size; an exception are "congenital nevi" that can be larger  Melanomas tend to grow and can often be greater than 0 6 cm (1/4 of an inch, or the size of a pencil eraser)  This is only a guideline, and many normal moles may be larger than 0 6 cm without being unhealthy  Any mole that starts to change in size (small to bigger or bigger to smaller) should be examined promptly by a board certified dermatologist      Evolving: Healthy moles tend to "stay the same "  Melanomas may often show signs of change or evolution such as a change in size, shape, color, or elevation  Any mole that starts to itch, bleed, crust, burn, hurt, or ulcerate or demonstrate a change or evolution should be examined promptly by a board certified dermatologist         Bigg Rainey and Plan:  Based on a thorough discussion of this condition and the management approach to it (including a comprehensive discussion of the known risks, side effects and potential benefits of treatment), the patient (family) agrees to implement the following specific plan:  When outside we recommend using a wide brim hat, sunglasses, long sleeve and pants, sunscreen with SPF 11+ with reapplication every 2 hours, or SPF specific clothing       What is a lentigo? A lentigo is a pigmented flat or slightly raised lesion with a clearly defined edge  Unlike an ephelis (freckle), it does not fade in the winter months  There are several kinds of lentigo    The name lentigo originally referred to its appearance resembling a small lentil  The plural of lentigo is lentigines, although “lentigos” is also in common use  Who gets lentigines? Lentigines can affect males and females of all ages and races  Solar lentigines are especially prevalent in fair skinned adults  Lentigines associated with syndromes are present at birth or arise during childhood  What causes lentigines? Common forms of lentigo are due to exposure to ultraviolet radiation:  Sun damage including sunburn   Indoor tanning   Phototherapy, especially photochemotherapy (PUVA)    Ionizing radiation, eg radiation therapy, can also cause lentigines  Several familial syndromes associated with widespread lentigines originate from mutations in Eric-MAP kinase, mTOR signaling and PTEN pathways  What is the treatment for lentigines? Most lentigines are left alone  Attempts to lighten them may not be successful  The following approaches are used:  SPF 50+ broad-spectrum sunscreen   Hydroquinone bleaching cream   Alpha hydroxy acids   Vitamin C   Retinoids   Azelaic acid   Chemical peels  Individual lesions can be permanently removed using:  Cryotherapy   Intense pulsed light   Pigment lasers    How can lentigines be prevented? Lentigines associated with exposure ultraviolet radiation can be prevented by very careful sun protection  Clothing is more successful at preventing new lentigines than are sunscreens  What is the outlook for lentigines? Lentigines usually persist  They may increase in number with age and sun exposure  Some in sun-protected sites may fade and disappear      MOORE ANGIOMAS    Assessment and Plan:  Based on a thorough discussion of this condition and the management approach to it (including a comprehensive discussion of the known risks, side effects and potential benefits of treatment), the patient (family) agrees to implement the following specific plan:  Monitor for changes  Benign, reassured    Assessment and Plan:    Cherry angioma, also known as [employer] spots, are benign vascular skin lesions  A "cherry angioma" is a firm red, blue or purple papule, 0 1-1 cm in diameter  When thrombosed, they can appear black in colour until evaluated with a dermatoscope when the red or purple colour is more easily seen  Cherry angioma may develop on any part of the body but most often appear on the scalp, face, lips and trunk  An angioma is due to proliferating endothelial cells; these are the cells that line the inside of a blood vessel  Angiomas can arise in early life or later in life; the most common type of angioma is a cherry angioma  Cherry angiomas are very common in males and females of any age or race  They are more noticeable in white skin than in skin of colour  They markedly increase in number from about the age of 36  There may be a family history of similar lesions  Eruptive cherry angiomas have been rarely reported to be associated with internal malignancy  The cause of angiomas is unknown  Genetic analysis of cherry angiomas has shown that they frequently carry specific somatic missense mutations in the GNAQ and GNA11 (Q209H) genes, which are involved in other vascular and melanocytic proliferations  SEBORRHEIC KERATOSIS; NON-INFLAMED    Assessment and Plan:  Based on a thorough discussion of this condition and the management approach to it (including a comprehensive discussion of the known risks, side effects and potential benefits of treatment), the patient (family) agrees to implement the following specific plan:  Monitor for changes  Benign, reassured    Seborrheic Keratosis  A seborrheic keratosis is a harmless warty spot that appears during adult life as a common sign of skin aging  Seborrheic keratoses can arise on any area of skin, covered or uncovered, with the usual exception of the palms and soles  They do not arise from mucous membranes   Seborrheic keratoses can have highly variable appearance  Seborrheic keratoses are extremely common  It has been estimated that over 90% of adults over the age of 61 years have one or more of them  They occur in males and females of all races, typically beginning to erupt in the 35s or 45s  They are uncommon under the age of 21 years  The precise cause of seborrhoeic keratoses is not known  Seborrhoeic keratoses are considered degenerative in nature  As time goes by, seborrheic keratoses tend to become more numerous  Some people inherit a tendency to develop a very large number of them; some people may have hundreds of them  There is no easy way to remove multiple lesions on a single occasion  Unless a specific lesion is "inflamed" and is causing pain or stinging/burning or is bleeding, most insurance companies do not authorize treatment  XEROSIS ("DRY SKIN")    Assessment and Plan:  Based on a thorough discussion of this condition and the management approach to it (including a comprehensive discussion of the known risks, side effects and potential benefits of treatment), the patient (family) agrees to implement the following specific plan:  Use moisturizer like Eucerin,Cerave or Aveeno Cream 3 times a day for the dry skin            Dry skin refers to skin that feels dry to touch  Dry skin has a dull surface with a rough, scaly quality  The skin is less pliable and cracked  When dryness is severe, the skin may become inflamed and fissured  Although any body site can be dry, dry skin tends to affect the shins more than any other site  Dry skin is lacking moisture in the outer horny cell layer (stratum corneum) and this results in cracks in the skin surface  Dry skin is also called xerosis, xeroderma or asteatosis (lack of fat)  It can affect males and females of all ages  There is some racial variability in water and lipid content of the skin    Dry skin that starts in early childhood may be one of about 20 types of ichthyosis (fish-scale skin)  There is often a family history of dry skin  Dry skin is commonly seen in people with atopic dermatitis  Nearly everyone > 60 years has dry skin  Dry skin that begins later may be seen in people with certain diseases and conditions  Postmenopausal women  Hypothyroidism  Chronic renal disease   Malnutrition and weight loss   Subclinical dermatitis   Treatment with certain drugs such as oral retinoids, diuretics and epidermal growth factor receptor inhibitors      What is the treatment for dry skin? The mainstay of treatment of dry skin and ichthyosis is moisturisers/emollients  They should be applied liberally and often enough to:  Reduce itch   Improve the barrier function   Prevent entry of irritants, bacteria   Reduce transepidermal water loss  How can dry skin be prevented? Eliminate aggravating factors:  Reduce the frequency of bathing  A humidifier in winter and air conditioner in summer   Compare having a short shower with a prolonged soak in a bath  Use lukewarm, not hot, water  Replace standard soap with a substitute such as a synthetic detergent cleanser, water-miscible emollient, bath oil, anti-pruritic tar oil, colloidal oatmeal etc    Apply an emollient liberally and often, particularly shortly after bathing, and when itchy  The drier the skin, the thicker this should be, especially on the hands  What is the outlook for dry skin? A tendency to dry skin may persist life-long, or it may improve once contributing factors are controlled  RASH    Assessment and Plan:  Based on a thorough discussion of this condition and the management approach to it (including a comprehensive discussion of the known risks, side effects and potential benefits of treatment), the patient (family) agrees to implement the following specific plan:  Discussed with patient that scarring alopecia means the hair will not grow back in those areas    Punch biopsies were discussed with patient today  It was advised that the biopsies will not change the course of treatment as there is no treatment available for this type of hair loss  Biopsy would just confirm diagnosis  Patient deferred at this time  You can try over the counter rogaine (minoxidil) 5% foam  Use one cap full a day on dry hair, part hair and apply directly to scalp  Do not do too close to bedtime  Need to do this daily  If you stop abruptly, you will lose hair  HISTORY OF BASAL CELL CARCINOMA    Assessment and Plan:  Based on a thorough discussion of this condition and the management approach to it (including a comprehensive discussion of the known risks, side effects and potential benefits of treatment), the patient (family) agrees to implement the following specific plan:  When outside we recommend using a wide brim hat, sunglasses, long sleeve and pants, sunscreen with SPF 23+ with reapplication every 2 hours, or SPF specific clothing     How can basal cell carcinoma be prevented? The most important way to prevent BCC is to avoid sunburn  This is especially important in childhood and early life  Fair skinned individuals and those with a personal or family history of BCC should protect their skin from sun exposure daily, year-round and lifelong  Stay indoors or under the shade in the middle of the day   Wear covering clothing   Apply high protection factor SPF50+ broad-spectrum sunscreens generously to exposed skin if outdoors   Avoid indoor tanning (sun beds, solaria)  Oral nicotinamide (vitamin B3) in a dose of 500 mg twice daily may reduce the number and severity of BCCs  What is the outlook for basal cell carcinoma? Most BCCs are cured by treatment  Cure is most likely if treatment is undertaken when the lesion is small  About 50% of people with BCC develop a second one within 3 years of the first  They are also at increased risk of other skin cancers, especially melanoma   Regular self-skin examinations and long-term annual skin checks by an experienced health professional are recommended  PRURITUS    Assessment and Plan:  Based on a thorough discussion of this condition and the management approach to it (including a comprehensive discussion of the known risks, side effects and potential benefits of treatment), the patient (family) agrees to implement the following specific plan:  Recommend supportive care such as ice packs  You can continue to use Atarax as prescribed  Advised that this medication will cause drowsiness  Recommend that you do not scratch area on back with the back scratcher your are currently using  What is pruritus? Pruritus is the medical term for itch  Itch is an unpleasant sensation on the skin that provokes the desire to rub or scratch the area to obtain relief  Itch can cause discomfort and frustration; in severe cases it can lead to disturbed sleep, anxiety and depression  Constant scratching to obtain relief can damage the skin (excoriation, lichenification) and reduce its effectiveness as a major protective barrier  Pruritus is often a symptom of an underlying disease process such as a skin problem, a systemic disease, or abnormal nerve impulses  What are skin signs of pruritus? There are no specific skin signs associated with pruritus, apart from scratch marks (excoriations) and signs of the underlying condition  Persistent scratching over a period of time may lead to:  Lichenification (thickened skin, lichen simplex)   Prurigo papules and nodules  Who gets pruritus? The epidemiology of pruritus depends on its underlying cause or causes  However, in general, the incidence of chronic pruritus increases with age, it is more common in women, and in those of  background       Mechanisms underlying pruritus  Itch, like pain, can originate anywhere along the neural itch pathway, from the central nervous system (brain and spinal cord) to the peripheral nervous system and the skin  Mechanisms underlying pruritus are complex  The itch signal is transmitted mainly through small, itch-selective C-fibers in the skin in addition to histamine-triggered and non-histaminergic neurons  These connect with secondary neurons which cross the opposite side of the spinothalamic tract and ascend to parts of the brain involved in sensation, emotion, reward and memory  These areas overlap with those activated by pain  Patients with chronic pruritus usually have both peripheral and central hypersensitization (heightened reaction) which means they tend to overreact to noxious stimuli which normally inhibit itch (such as heat and scratching) and also misinterpret non-noxious stimuli as an itch (eg, light touch)    The way scratching stops itching has been explained by an interaction with pain pathways within the dorsal horn of the spinal cord  Localized pruritus  Localized pruritus is pruritus that is confined to a certain part of the body  It can occur in association with a primary rash (eg dermatitis) or may occur because of hypersensitive nerves in the skin (neuropathic pruritus)  Neuropathic pruritus is due to compression or degeneration of nerves in the skin, on route to the spine or in the spine itself  Neuropathic itch is sometimes associated with reduced or absent sweating in the affected area of skin    Typical causes of localized itchy rashes  Scalp: seborrhoeic dermatitis, head lice   Back: Andrew disease   Hands: pompholyx, irritant and/or allergic contact dermatitis   Genitals: vulvovaginal Candida albicans infection, lichen sclerosus   Legs: venous eczema   Feet: tinea pedis    Neuropathic causes of localized pruritus without primary rash  Face: trigeminal trophic syndrome   Hand: cheiralgia paraesthetica   Arm: brachioradial pruritus   Back: notalgia paraesthetica/topics/brachioradial-pruritus/   Genital: pruritus vulvae, pruritus ani   Dermatomal: herpes zoster (shingles) during recovery phase  Scratching a localised itch may lead to lichen simplex, prurigo or prurigo nodularis  Systemic causes of pruritus  Sytemic diseases may cause generalised pruritus  This is sometimes called metabolic itch  There is nothing wrong with the skin itself, at least until it's been scratched  Metabolic disorders include chronic renal failure (dialysis) and liver disease (with or without cholestasis)  Uraemic pruritus arises in patients undergoing dialysis is due to a combination of xerosis (dry skin), secondary hyperparathyroidism, peripheral neuropathy (nerve changes) and inflammation  Secondary hyperparathyroidism which also occurs in dialysis patients leads to microprecipitation (deposition) of calcium and magnesium salts in the skin, triggering mast cell degeneration, releasing serotonin and histamine  Once chronic pruritus has occurred, there may be secondary changes in the nerves in the skin and central nervous system which heighten the sensation of itch  Hepatogenic pruritus is more common in intrahepatic than extrahepatic cholestasis  Examples of intrahepatic cholestasis is associated with chronic viral hepatitis, primary biliary cirrhosis, pregnancy-related cholestasis  Extra-hepatic cholestasis is associated with pressure on the bile ducts, eg from pancreatic tumours or pseudocysts  Cholestasis is thought to release toxic substances from the liver, which stimulates neural itch fibres in the skin  Characteristically, cholestatic pruritus is most severe at night; it tends to affect the hands, feet and areas where clothes are rubbing on the skin  Haematological disorders include iron deficiency anaemia and polycythaemia vera  Generalized pruritus along with glossitis (tongue inflammation) and angular cheilitis (inflammation of mouth corners) are seen in iron deficiency anaemia     In polycythaemia vera, itch is usually precipitated by contact with water (aquagenic pruritus), eg after a shower  This is thought to be mediated by the effect of platelets, serotonin and prostaglandins  Endocrine disorders include thyroid disease and diabetes mellitus  In Graves' disease (thyrotoxicosis), increased blood flow, skin temperature and decreased itch threshold mediated by the increase in thyroid hormones, lead to the itch  Pruritus associated with myxoedema and hypothyroidism is rare, and if present, is more likely the result of xerosis (dry skin)  In diabetes mellitus, localized itch tends to occur in the perianal/genital region usually due to Candida albicans or dermatophyte infections  It is unclear if metabolic abnormalities such as renal impairment, autonomic failure or diabetic neuropathy contribute to this  Paraneoplastic itch is associated with lymphoma, especially Hodgkin lymphoma, leukemia or a solid organ tumor (eg lung, colon, brain)  In Hodgkin lymphoma, pruritus is thought to be caused by histamine release, which may be related to eosinophilia  Infections causing itch include human immunodeficiency virus infection (HIV) and hepatitis C virus  Patients with HIV commonly complain of itch  This may be associated with skin infections/infestations, dry skin, drug reactions, hyperoesinophilia (increased eosinophil levels) and cutaneous T cell lymphoma  There is a possible correlation between intractable pruritus and increased HIV viral load  In chronic hepatitis C infection, the mechanisms responsible for itch remain unclear  In the absence of cholestasis, pruritus may be related to antiviral therapy; it has been noted to occur in patients treated with combination therapy (interferon tavares and ribavirin)  Pruritic skin diseases  Pruritus is often a symptom of many skin diseases   Some of these are included in the following list   Allergic contact dermatitis   Dry skin   Urticaria   Psoriasis   Atopic dermatitis   Folliculitis   Dermatitis herpetiformis   Lichen simplex   Lichen planus   Bullous pemphigoid   Lice   Scabies   Miliaria   Sunburn   Pityriasis rosea   Mycosis fungoides    Exposure-related pruritus  Pruritus may arise as a result of exposure to certain external factors  Allergens or irritants   Cold, which can cause 'winter itch'   A physical urticaria, such as dermographism   Aquagenic pruritus (itch on exposure to water)   Insects and infestations, eg scabies   Medications (topical or systemic) eg opioids, aspirin    Hormonal reasons for pruritus  About 2% of pregnant women have pruritus without any obvious dermatological cause  In some cases the itch is due to cholestasis (pooling of bile in the gall bladder and liver)  It usually occurs in the 3rd trimester and is relieved after giving birth  Generalized itch is also a common symptom of menopause  How is pruritus diagnosed? The first steps of evaluation of an itchy patient are medical history and examination  A thorough history can identify constitutional symptoms that may point towards an underlying systemic disease  Drug triggers such as opioids may be identified, especially if the commencement of the drug relates to the itch  A careful examination can identify dermatological causes for the itch (eg scabies, lichen simplex, pemphigoid) or evidence of chronic skin changes related to the itch  In dermatological causes of pruritus, primary skin lesions will usually suggest the diagnosis  Patients without primary skin lesions and little evidence of chronic scratching should be investigated for systemic, neuropathic and psychogenic causes  The panel of investigations could include:  Full/complete blood count   Creatinine and renal function tests   Liver function tests   Thyroid function tests   Erythrocyte sedimentation rate   Chest radiography   HIV serology    What treatment is available for itch?   The management of pruritus relies on establishing the cause and then either removing or treating the cause to prevent further itching  In many cases, tests are necessary to determine the cause; while these are in progress, treatment to provide symptomatic relief of pruritus may be given  Topical treatments  In addition to specific therapy for any underlying skin or internal disease, topical treatment may include:  Wet dressings or tepid shower to cool the skin   Calamine lotion (contains phenol, which cools the skin): avoid on dry skin and limit use to a few days   Menthol/camphor lotion: gives a chilling sensation   Local anesthetics, such as pramoxine (also called pramocaine), applied to small itchy spots such as insect bites   Regular use of emollients, especially if skin is dry   Mild topical corticosteroids for short periods   Topical calcineurin inhibitors are also used to reduce itch associated with inflammatory skin conditions   Topical doxepin, a tricyclic antidepressant and antihistamine, is an antipruritic used in eczema  Other measures that can be useful in preventing pruritus include avoiding precipitating factors such as rough clothing or fabrics, overheating, and vasodilators if they provoke itching (eg, caffeine, alcohol, spices)  Fingernails should be kept short and clean  If the urge to scratch is irresistible then rub the area with your palm  Topical antihistamines should not be used for chronic itch, as they may sensitize the skin and result in allergic contact dermatitis  Systemic therapy  If pruritus is severe and sleep is disturbed, then treatment with oral medication may be necessary  Some drugs may help to relieve the itch whilst others are given solely for their sedative effects  Antihistamines are most useful in urticaria, in which histamine is released  Use for other pruritic conditions is not supported by randomized control trials  Sedating antihistamines may be used for their sedative effects     Doxepin and amitriptyline are tricyclic antidepressants have antipruritic action and act on the central and peripheral nervous systems  Tetracyclic antidepressants such as mirtazepine and selective serotonin reuptake inhibitors (paroxetine, sertraline, fluoxetine) may also help some patients with severe itch including when it is caused by cholestasis, T-cell lymphoma, malignancy or a neuropathic condition  Anti-epileptic drugs such as sodium valproate, gabapentin and pregabalin may also be of benefit to some patients, e g , those with itch associated with renal failure or neuropathic itch  The mechanism of action is uncertain  Opioid antagonists such as butorphanol intranasal spray, naltrexone tablets, and naloxone have been effective in patients suffering from intractable pruritus in association with liver disease, atopic eczema and chronic urticaria  Nalfurafine, which is a kappa-opioid agonist has also been studied and shown to reduce itch associated with chronic renal impairment, however it is not widely available  Aspirin is sometimes effective if pruritus is mediated by kinins or prostaglandins and is noted to be effective in patients with pruritus due to polycythaemia vera  Note: aspirin may cause or aggravate itch in some patients  Thalidomide has been successful in treating nodular prurigo and chronic pruritus of various kinds but is rarely used because of serious adverse effects and expense  Rifampicin is effective for patients with pruritus associated with cholestasis (some forms of liver disease)  Isolated case reports in severe itch associated with malignancy have reported success with the NKR1 antagonist, aprepitant (normally used short-term for postoperative or chemotherapy-induced nausea)  This is under investigation for neuropathic itch and nodular prurigo      Phototherapy  Broadband ultraviolet B or narrow-band UVB phototherapy alone, or in conjunction with UVA, has been shown to be helpful for pruritus associated with chronic kidney disease, psoriasis, atopic eczema and cutaneous T-cell lymphoma  Behavioral therapy  Behavioral therapy may be used in conjunction with pharmacotherapy to modify behaviours such as coping mechanisms and stress reduction, which help interrupt the itch-scratch cycle  One randomized controlled trial showed short-term benefits in reduction in itch frequency and scratching as well as improvement in coping mechanisms  What is the outcome for pruritus? The management of chronic severe itch is difficult and often requires the use of combination therapy over a long period of time  Identification and treatment of underlying conditions causing pruritus may help in this process  The symptom may quickly disappear or persist for long periods of time  VERRUCA VULGARIS ("Common Warts")    Assessment and Plan:  Based on a thorough discussion of this condition and the management approach to it (including a comprehensive discussion of the known risks, side effects and potential benefits of treatment), the patient (family) agrees to implement the following specific plan:  Can continue the over the counter treatment you have been using  Verruca Vulgaris  A verruca is a common growth of the skin caused by infection by human papilloma virus (HPV)  There are many strains of the virus that cause different types of warts on the body  The virus infects the most superficial layers of the skin, causing increased production of skin cells and thickening  Warts can be spread through direct contact with infected skin and may spread to other parts of the body if scratched or picked  A verruca is more commonly called a "wart " Warts are particularly common in school-aged children but can arise at any age  Patients who have a history of eczema are especially prone due to impaired skin barrier  Those taking immunosuppressive drugs or with HIV infections may experience prolonged symptoms despite treatment       Warts generally have a rough surface with a tiny black dot sometimes observed in the middle of each scaly spot  They can range in size from a small bump to large scaly growths  Common warts are often found on the backs of fingers or toes, around the nails, and on the knees  Plantar warts can grow inwardly on the soles of the feet causing pain  There are many possible ways to treat warts and sometimes several different treatments are needed to get the warts to go away completely  There is no single perfect treatment for warts, and successful treatment can take many months  In-office treatments usually require multiple visits, and include:  Cryotherapy  a cold spray with liquid nitrogen will destroy the infected cells but may lead to discomfort and blistering  It may also leave a permanent white hilario or scar  Electrosurgery (curettage and cautery) can be used for large resistant warts which involves shaving the growth down and burning the base  It is performed under local anesthesia and may leave a permanent scar    Candida (“yeast”) antigen injections  These are extracts of the common yeast (Candida) that cannot cause an infection  The medication is injected into/under the wart  It is thought to stimulate the immune system to recognize the wart virus and attack it  Multiple injections are often needed about one month apart  There are also several at-home wart treatments:    Soak the warts in warm water for 5 minutes every night followed by gentle filing with a nail file or pumice stone  Topical salicylic acid or similar compounds work by removing the dead surface skin cells  Apply the medicine directly to the wart, wait for it to dry completely, then cover with duct tape overnight   Repeat until the wart is gone, which can take 2-4 months  Do not use on the face or groin area   If the wart paint makes the skin sore, stop treatment until the discomfort has settled, then recommence as above    Take care to keep the chemical off normal skin     Podophyllin is a cytotoxic agent used in some products and must not be used in pregnancy or women considering pregnancy  Some prescription medications include   Topical retinoids (adapalene, tretinoin, tazarotene), 5-fluorouracil (Efudex) or imiquimod (Aldara) creams are sometimes used to treat flat warts or warts on the face and other sensitive anatomical areas  They are usually applied directly to the warts once a day for 2-4 months and can be irritating  These treatments should only be used as directed by your health care provider  Systemic treatment with oral cimetidine (Tagamet) may help boost the immune system against the wart virus in patients, some of the time  Initiation of cimetidine therapy should ONLY be done under the supervision of your health care provider, who can discuss possible side effects and drug-to-drug interactions of this specific treatment

## 2023-03-17 ENCOUNTER — HOSPITAL ENCOUNTER (OUTPATIENT)
Dept: MAMMOGRAPHY | Facility: CLINIC | Age: 78
End: 2023-03-17

## 2023-03-17 DIAGNOSIS — Z12.31 SCREENING MAMMOGRAM, ENCOUNTER FOR: ICD-10-CM

## 2023-05-16 ENCOUNTER — HOSPITAL ENCOUNTER (OUTPATIENT)
Dept: RADIOLOGY | Facility: HOSPITAL | Age: 78
Discharge: HOME | End: 2023-05-16

## 2023-05-16 DIAGNOSIS — R52 BODY ACHES: ICD-10-CM

## 2023-05-30 ENCOUNTER — OFFICE VISIT (OUTPATIENT)
Dept: FAMILY MEDICINE CLINIC | Facility: CLINIC | Age: 78
End: 2023-05-30

## 2023-05-30 VITALS
BODY MASS INDEX: 25.23 KG/M2 | SYSTOLIC BLOOD PRESSURE: 120 MMHG | OXYGEN SATURATION: 97 % | WEIGHT: 157 LBS | HEIGHT: 66 IN | HEART RATE: 78 BPM | DIASTOLIC BLOOD PRESSURE: 78 MMHG

## 2023-05-30 DIAGNOSIS — M54.50 LUMBAR SPINE PAIN: Primary | ICD-10-CM

## 2023-05-30 DIAGNOSIS — M81.0 OSTEOPOROSIS, UNSPECIFIED OSTEOPOROSIS TYPE, UNSPECIFIED PATHOLOGICAL FRACTURE PRESENCE: ICD-10-CM

## 2023-05-30 RX ORDER — ALENDRONATE SODIUM 70 MG/1
70 TABLET ORAL
Qty: 4 TABLET | Refills: 5 | Status: SHIPPED | OUTPATIENT
Start: 2023-05-30

## 2023-05-30 NOTE — ASSESSMENT & PLAN NOTE
Patient will think about starting fosamax, strongly recommend continuing calcium and vitamin D, follow up as needed

## 2023-05-30 NOTE — ASSESSMENT & PLAN NOTE
Patient has had lumbar spine pain for over a year, xray shows arthritis, please have MRI, referred to [address] [place] [address], continue active lifestyle

## 2023-05-30 NOTE — PROGRESS NOTES
Assessment/Plan:         Problem List Items Addressed This Visit        Musculoskeletal and Integument    Osteoporosis     Patient will think about starting fosamax, strongly recommend continuing calcium and vitamin D, follow up as needed  Relevant Medications    alendronate (Fosamax) 70 mg tablet       Other    Lumbar spine pain - Primary     Patient has had lumbar spine pain for over a year, xray shows arthritis, please have MRI, referred to [facility], continue active lifestyle  Relevant Orders    MRI lumbar spine w wo contrast         Subjective:      Patient ID: Lior Dugan is a 70 y o  female  Joojosselyn FinneyChinyere is here for follow up, she is continuing to have lumbar spine pain and right hip pain  When she takes the ibuprofen the pain is alleviated  The following portions of the patient's history were reviewed and updated as appropriate:   Past Medical History:  She has a past medical history of Disease of thyroid gland  ,  _______________________________________________________________________  Medical Problems:  does not have any pertinent problems on file ,  _______________________________________________________________________  Past Surgical History:   has a past surgical history that includes Shoulder surgery (Right, 2012); FL injection right shoulder (arthrogram) (11/18/2019); Joint replacement; and Knee surgery (Left, 2021)  ,  _______________________________________________________________________  Family History:  family history includes Diabetes in her father and sister; Polymyalgia rheumatica in her mother ,  _______________________________________________________________________  Social History:   reports that she quit smoking about 41 years ago  Her smoking use included cigarettes  She has never used smokeless tobacco  She reports current alcohol use of about 1 0 standard drink of alcohol per week   She reports that she does not use drugs ,  _______________________________________________________________________  Allergies:  has No Known Allergies     _______________________________________________________________________  Current Outpatient Medications   Medication Sig Dispense Refill   • alendronate (Fosamax) 70 mg tablet Take 1 tablet (70 mg total) by mouth every 7 days 4 tablet 5   • acyclovir (ZOVIRAX) 400 MG tablet Take 1 tablet (400 mg total) by mouth 3 (three) times a day for 7 days 21 tablet 0   • acyclovir (ZOVIRAX) 400 MG tablet      • Azelastine HCl 137 MCG/SPRAY SOLN 1 spray into each nostril 2 (two) times a day 30 mL 5   • ciprofloxacin-dexamethasone (CIPRODEX) otic suspension Administer 4 drops into both ears 2 (two) times a day 7 5 mL 0   • clobetasol (TEMOVATE) 0 05 % ointment Apply topically 2 (two) times a day 30 g 0   • COVID-19 At Home Antigen Test ([facility] COVID-19  Home Test) KIT 1 Units into each nostril in the morning 5 kit 1   • COVID-19 At Home Antigen Test ([facility] COVID-19  Home Test) KIT 1 Device into each nostril in the morning 5 kit 1   • hydrOXYzine HCL (ATARAX) 10 mg tablet Take 1 tablet (10 mg total) by mouth every 6 (six) hours as needed for itching 30 tablet 0   • levothyroxine 75 mcg tablet TAKE 1 TABLET (75 MCG TOTAL) BY MOUTH DAILY IN THE EARLY MORNING 90 tablet 1   • mirtazapine (REMERON) 15 mg tablet TAKE 1 TABLET BY MOUTH DAILY AT BEDTIME 90 tablet 1   • mupirocin (BACTROBAN) 2 % ointment Apply topically 3 (three) times a day 22 g 0   • NON FORMULARY wobenzyme     • PreviDent 5000 Booster Plus 1 1 % PSTE USE ONCE A DAY AT BEDTIME       No current facility-administered medications for this visit      _______________________________________________________________________  Review of Systems   Constitutional: Positive for fatigue  Negative for chills, diaphoresis and fever  HENT: Negative for congestion, ear pain, postnasal drip, rhinorrhea, sinus pressure, sinus pain and sore throat      Eyes: "Negative for pain and visual disturbance  Respiratory: Negative for cough, chest tightness, shortness of breath and wheezing  Cardiovascular: Negative for chest pain and palpitations  Gastrointestinal: Negative for abdominal pain, constipation, diarrhea, nausea and vomiting  Genitourinary: Negative for dysuria, hematuria and urgency  Musculoskeletal: Negative for arthralgias, back pain and myalgias  Skin: Negative for color change and rash  Neurological: Negative for dizziness, seizures, syncope, light-headedness and headaches  Psychiatric/Behavioral: Negative for dysphoric mood and sleep disturbance  The patient is not nervous/anxious  All other systems reviewed and are negative  Objective:  Vitals:    05/30/23 1428   BP: 120/78   Pulse: 78   SpO2: 97%   Weight: 71 2 kg (157 lb)   Height: 5' 6\" (1 676 m)     Body mass index is 25 34 kg/m²  Physical Exam  Vitals and nursing note reviewed  Constitutional:       General: She is not in acute distress  Appearance: Normal appearance  She is not ill-appearing  HENT:      Head: Normocephalic  Right Ear: Tympanic membrane, ear canal and external ear normal  There is no impacted cerumen  Left Ear: Tympanic membrane, ear canal and external ear normal  There is no impacted cerumen  Nose: Nose normal  No congestion  Mouth/Throat:      Mouth: Mucous membranes are moist    Eyes:      Extraocular Movements: Extraocular movements intact  Conjunctiva/sclera: Conjunctivae normal       Pupils: Pupils are equal, round, and reactive to light  Cardiovascular:      Rate and Rhythm: Normal rate and regular rhythm  Pulses: Normal pulses  Heart sounds: Normal heart sounds  Pulmonary:      Effort: Pulmonary effort is normal  No respiratory distress  Breath sounds: Normal breath sounds  No wheezing  Abdominal:      General: Bowel sounds are normal       Palpations: Abdomen is soft     Musculoskeletal:         " General: No swelling or tenderness  Normal range of motion  Cervical back: Normal range of motion  No tenderness  Right lower leg: No edema  Left lower leg: No edema  Lymphadenopathy:      Cervical: No cervical adenopathy  Skin:     General: Skin is warm and dry  Neurological:      General: No focal deficit present  Mental Status: She is alert and oriented to person, place, and time  Psychiatric:         Mood and Affect: Mood normal          Behavior: Behavior normal          Thought Content:  Thought content normal          Judgment: Judgment normal

## 2023-06-05 ENCOUNTER — TELEPHONE (OUTPATIENT)
Dept: FAMILY MEDICINE CLINIC | Facility: CLINIC | Age: 78
End: 2023-06-05

## 2023-06-06 DIAGNOSIS — M81.0 OSTEOPOROSIS, UNSPECIFIED OSTEOPOROSIS TYPE, UNSPECIFIED PATHOLOGICAL FRACTURE PRESENCE: ICD-10-CM

## 2023-06-06 DIAGNOSIS — E55.9 VITAMIN D DEFICIENCY: Primary | ICD-10-CM

## 2023-06-06 RX ORDER — ERGOCALCIFEROL 1.25 MG/1
50000 CAPSULE ORAL WEEKLY
Qty: 16 CAPSULE | Refills: 0 | Status: SHIPPED | OUTPATIENT
Start: 2023-06-06 | End: 2023-08-29

## 2023-06-13 ENCOUNTER — TELEPHONE (OUTPATIENT)
Dept: FAMILY MEDICINE CLINIC | Facility: CLINIC | Age: 78
End: 2023-06-13

## 2023-06-13 DIAGNOSIS — M54.50 LUMBAR SPINE PAIN: Primary | ICD-10-CM

## 2023-06-13 NOTE — TELEPHONE ENCOUNTER
She received a call today and didn't understand it, about her MRI  It was denied and they told her they tried to get a hold of Dr Asha Hale 5 times  Please help in this matter

## 2023-06-13 NOTE — TELEPHONE ENCOUNTER
Joy Bradford were you aware that the MRI was denied through insurance   There is notes documented under the referral placed as to why it was

## 2023-06-20 ENCOUNTER — TELEPHONE (OUTPATIENT)
Dept: FAMILY MEDICINE CLINIC | Facility: CLINIC | Age: 78
End: 2023-06-20

## 2023-06-20 NOTE — TELEPHONE ENCOUNTER
Called patient to let he know Ariela Major is out for this week and when she comes back we will see what the next step is in regards to this

## 2023-06-20 NOTE — TELEPHONE ENCOUNTER
Patient called to say Imaging called and said her MRI was denied again by insurance  Not confirmed but insurance may need x-rays, physical therapy done prior

## 2023-06-27 NOTE — TELEPHONE ENCOUNTER
At this point because it has been a few weeks past the last prior auth  I would have her come back in and in your note put that she has had the x-ray done and physical therapy in the past with no + outcome and no relief   Then re order the MRI and have  them try the prior auth with the updated information

## 2023-07-01 DIAGNOSIS — F33.41 RECURRENT MAJOR DEPRESSIVE DISORDER, IN PARTIAL REMISSION (HCC): ICD-10-CM

## 2023-07-03 RX ORDER — MIRTAZAPINE 15 MG/1
TABLET, FILM COATED ORAL
Qty: 90 TABLET | Refills: 1 | Status: SHIPPED | OUTPATIENT
Start: 2023-07-03

## 2023-07-31 DIAGNOSIS — L23.7 POISON IVY: Primary | ICD-10-CM

## 2023-07-31 RX ORDER — CLOBETASOL PROPIONATE 0.5 MG/G
CREAM TOPICAL 2 TIMES DAILY
Qty: 60 G | Refills: 1 | Status: SHIPPED | OUTPATIENT
Start: 2023-07-31

## 2023-08-10 ENCOUNTER — OFFICE VISIT (OUTPATIENT)
Dept: FAMILY MEDICINE CLINIC | Facility: CLINIC | Age: 78
End: 2023-08-10
Payer: COMMERCIAL

## 2023-08-10 VITALS
OXYGEN SATURATION: 94 % | HEART RATE: 70 BPM | WEIGHT: 153.2 LBS | SYSTOLIC BLOOD PRESSURE: 108 MMHG | TEMPERATURE: 97.6 F | BODY MASS INDEX: 24.62 KG/M2 | HEIGHT: 66 IN | DIASTOLIC BLOOD PRESSURE: 68 MMHG

## 2023-08-10 DIAGNOSIS — R53.83 FATIGUE, UNSPECIFIED TYPE: ICD-10-CM

## 2023-08-10 DIAGNOSIS — L98.9 SKIN LESION: ICD-10-CM

## 2023-08-10 DIAGNOSIS — M54.50 LUMBAR SPINE PAIN: Primary | ICD-10-CM

## 2023-08-10 PROCEDURE — 99214 OFFICE O/P EST MOD 30 MIN: CPT

## 2023-08-10 NOTE — ASSESSMENT & PLAN NOTE
Will refer to menopause specialist, patient is possibly interested in finding out more about bio identical hormonal treatment. Follow up here as needed.

## 2023-08-10 NOTE — ASSESSMENT & PLAN NOTE
Patient is willing to get opinion from physical therapy and see spine and pain, will hold off on ordering MRI at this time, currently treating pain with ibuprofen very sparingly. Follow up here in October.

## 2023-08-10 NOTE — PROGRESS NOTES
Assessment/Plan:         Problem List Items Addressed This Visit        Other    Fatigue     Will refer to menopause specialist, patient is possibly interested in finding out more about bio identical hormonal treatment. Follow up here as needed. Relevant Orders    Ambulatory Referral to Gynecology    Lumbar spine pain - Primary     Patient is willing to get opinion from physical therapy and see spine and pain, will hold off on ordering MRI at this time, currently treating pain with ibuprofen very sparingly. Follow up here in October. Relevant Orders    Ambulatory Referral to Physical Therapy    Ambulatory referral to Spine & Pain Management   Other Visit Diagnoses     Skin lesion        please make appointment with derm, patient with multiple basal cell removals in the past    Relevant Orders    Ambulatory Referral to Dermatology            Subjective:      Patient ID: Ida Ramirez is a 70 y.o. female. Robert Lopez is here for follow up, she is still having a lot of back pain and she was struggling with the heat. The following portions of the patient's history were reviewed and updated as appropriate:   Past Medical History:  She has a past medical history of Disease of thyroid gland. ,  _______________________________________________________________________  Medical Problems:  does not have any pertinent problems on file.,  _______________________________________________________________________  Past Surgical History:   has a past surgical history that includes Shoulder surgery (Right, 2012); FL injection right shoulder (arthrogram) (11/18/2019); Joint replacement; and Knee surgery (Left, 2021). ,  _______________________________________________________________________  Family History:  family history includes Diabetes in her father and sister;  Polymyalgia rheumatica in her mother.,  _______________________________________________________________________  Social History:   reports that she quit smoking about 41 years ago. Her smoking use included cigarettes. She has never used smokeless tobacco. She reports current alcohol use of about 1.0 standard drink of alcohol per week. She reports that she does not use drugs. ,  _______________________________________________________________________  Allergies:  has No Known Allergies. .  _______________________________________________________________________  Current Outpatient Medications   Medication Sig Dispense Refill   • acyclovir (ZOVIRAX) 400 MG tablet Take 1 tablet (400 mg total) by mouth 3 (three) times a day for 7 days 21 tablet 0   • acyclovir (ZOVIRAX) 400 MG tablet      • alendronate (Fosamax) 70 mg tablet Take 1 tablet (70 mg total) by mouth every 7 days 4 tablet 5   • Azelastine HCl 137 MCG/SPRAY SOLN 1 spray into each nostril 2 (two) times a day 30 mL 5   • ciprofloxacin-dexamethasone (CIPRODEX) otic suspension Administer 4 drops into both ears 2 (two) times a day 7.5 mL 0   • clobetasol (TEMOVATE) 0.05 % cream Apply topically 2 (two) times a day 60 g 1   • COVID-19 At Home Antigen Test ([facility] COVID-19 Ag Home Test) KIT 1 Units into each nostril in the morning 5 kit 1   • COVID-19 At Home Antigen Test ([facility] COVID-19 Ag Home Test) KIT 1 Device into each nostril in the morning 5 kit 1   • ergocalciferol (VITAMIN D2) 50,000 units Take 1 capsule (50,000 Units total) by mouth once a week 16 capsule 0   • hydrOXYzine HCL (ATARAX) 10 mg tablet Take 1 tablet (10 mg total) by mouth every 6 (six) hours as needed for itching 30 tablet 0   • levothyroxine 75 mcg tablet TAKE 1 TABLET (75 MCG TOTAL) BY MOUTH DAILY IN THE EARLY MORNING 90 tablet 1   • mirtazapine (REMERON) 15 mg tablet TAKE 1 TABLET BY MOUTH EVERYDAY AT BEDTIME 90 tablet 1   • mupirocin (BACTROBAN) 2 % ointment Apply topically 3 (three) times a day 22 g 0   • NON FORMULARY wobenzyme     • PreviDent 5000 Booster Plus 1.1 % PSTE USE ONCE A DAY AT BEDTIME       No current facility-administered medications for this visit.     _______________________________________________________________________  Review of Systems   Constitutional: Positive for fatigue. Negative for chills, diaphoresis and fever. HENT: Negative for congestion, ear pain, postnasal drip, rhinorrhea, sinus pressure, sinus pain and sore throat. Eyes: Negative for pain and visual disturbance. Respiratory: Negative for cough, chest tightness, shortness of breath and wheezing. Cardiovascular: Negative for chest pain and palpitations. Gastrointestinal: Negative for abdominal pain, constipation, diarrhea, nausea and vomiting. Genitourinary: Negative for dysuria, frequency, hematuria and urgency. Musculoskeletal: Positive for arthralgias, joint swelling and myalgias. Negative for back pain. Skin: Negative for color change and rash. Neurological: Negative for dizziness, seizures, syncope, light-headedness and headaches. Psychiatric/Behavioral: Negative for dysphoric mood and sleep disturbance. The patient is not nervous/anxious. All other systems reviewed and are negative. Objective:  Vitals:    08/10/23 1501   BP: 108/68   BP Location: Right arm   Patient Position: Sitting   Cuff Size: Standard   Pulse: 70   Temp: 97.6 °F (36.4 °C)   SpO2: 94%   Weight: 69.5 kg (153 lb 3.2 oz)   Height: 5' 6" (1.676 m)     Body mass index is 24.73 kg/m². Physical Exam  Vitals and nursing note reviewed. Constitutional:       General: She is not in acute distress. Appearance: Normal appearance. She is not ill-appearing. HENT:      Head: Normocephalic. Right Ear: Tympanic membrane, ear canal and external ear normal. There is no impacted cerumen. Left Ear: Tympanic membrane, ear canal and external ear normal. There is no impacted cerumen. Nose: Nose normal. No congestion. Mouth/Throat:      Mouth: Mucous membranes are moist.   Eyes:      Extraocular Movements: Extraocular movements intact. Conjunctiva/sclera: Conjunctivae normal.      Pupils: Pupils are equal, round, and reactive to light. Cardiovascular:      Rate and Rhythm: Normal rate and regular rhythm. Pulses: Normal pulses. Heart sounds: Normal heart sounds. Pulmonary:      Effort: Pulmonary effort is normal. No respiratory distress. Breath sounds: Normal breath sounds. No wheezing. Abdominal:      General: Bowel sounds are normal.      Palpations: Abdomen is soft. Musculoskeletal:         General: Tenderness present. No swelling. Normal range of motion. Cervical back: Normal range of motion. No tenderness. Right lower leg: No edema. Left lower leg: No edema. Lymphadenopathy:      Cervical: No cervical adenopathy. Skin:     General: Skin is warm and dry. Neurological:      General: No focal deficit present. Mental Status: She is alert and oriented to person, place, and time. Psychiatric:         Mood and Affect: Mood normal.         Behavior: Behavior normal.         Thought Content:  Thought content normal.         Judgment: Judgment normal.

## 2023-08-29 DIAGNOSIS — E55.9 VITAMIN D DEFICIENCY: ICD-10-CM

## 2023-08-29 DIAGNOSIS — M81.0 OSTEOPOROSIS, UNSPECIFIED OSTEOPOROSIS TYPE, UNSPECIFIED PATHOLOGICAL FRACTURE PRESENCE: ICD-10-CM

## 2023-08-29 RX ORDER — ERGOCALCIFEROL 1.25 MG/1
CAPSULE ORAL
Qty: 12 CAPSULE | Refills: 1 | Status: SHIPPED | OUTPATIENT
Start: 2023-08-29

## 2023-10-09 DIAGNOSIS — E03.9 HYPOTHYROIDISM, UNSPECIFIED TYPE: ICD-10-CM

## 2023-10-09 RX ORDER — LEVOTHYROXINE SODIUM 0.07 MG/1
75 TABLET ORAL
Qty: 90 TABLET | Refills: 1 | Status: SHIPPED | OUTPATIENT
Start: 2023-10-09

## 2023-10-09 NOTE — TELEPHONE ENCOUNTER
Although patient send in script refill via My Chart she also came in person.   Levothyroxine 75 mcg  Says she has none left   [facility] [place]

## 2023-10-10 ENCOUNTER — IMMUNIZATIONS (OUTPATIENT)
Dept: FAMILY MEDICINE CLINIC | Facility: CLINIC | Age: 78
End: 2023-10-10
Payer: COMMERCIAL

## 2023-10-10 DIAGNOSIS — Z23 NEED FOR INFLUENZA VACCINATION: Primary | ICD-10-CM

## 2023-10-10 PROCEDURE — G0008 ADMIN INFLUENZA VIRUS VAC: HCPCS

## 2023-10-10 PROCEDURE — 90662 IIV NO PRSV INCREASED AG IM: CPT

## 2023-12-31 DIAGNOSIS — F33.41 RECURRENT MAJOR DEPRESSIVE DISORDER, IN PARTIAL REMISSION (HCC): ICD-10-CM

## 2024-01-02 RX ORDER — MIRTAZAPINE 15 MG/1
TABLET, FILM COATED ORAL
Qty: 90 TABLET | Refills: 1 | Status: SHIPPED | OUTPATIENT
Start: 2024-01-02

## 2024-02-26 DIAGNOSIS — E55.9 VITAMIN D DEFICIENCY: ICD-10-CM

## 2024-02-26 DIAGNOSIS — M81.0 OSTEOPOROSIS, UNSPECIFIED OSTEOPOROSIS TYPE, UNSPECIFIED PATHOLOGICAL FRACTURE PRESENCE: ICD-10-CM

## 2024-02-26 RX ORDER — ERGOCALCIFEROL 1.25 MG/1
CAPSULE ORAL
Qty: 12 CAPSULE | Refills: 1 | Status: SHIPPED | OUTPATIENT
Start: 2024-02-26

## 2024-02-29 ENCOUNTER — TELEPHONE (OUTPATIENT)
Dept: FAMILY MEDICINE CLINIC | Facility: CLINIC | Age: 79
End: 2024-02-29

## 2024-02-29 NOTE — TELEPHONE ENCOUNTER
She had a covid booster on 11/2/2023 and read that it is recommended to get another one in the Spring. Please advise.

## 2024-04-02 DIAGNOSIS — E03.9 HYPOTHYROIDISM, UNSPECIFIED TYPE: ICD-10-CM

## 2024-04-02 RX ORDER — LEVOTHYROXINE SODIUM 0.07 MG/1
75 TABLET ORAL
Qty: 90 TABLET | Refills: 1 | Status: SHIPPED | OUTPATIENT
Start: 2024-04-02

## 2024-05-03 ENCOUNTER — TELEPHONE (OUTPATIENT)
Dept: INTERNAL MEDICINE CLINIC | Facility: CLINIC | Age: 79
End: 2024-05-03

## 2024-05-29 ENCOUNTER — RA CDI HCC (OUTPATIENT)
Dept: OTHER | Facility: HOSPITAL | Age: 79
End: 2024-05-29

## 2024-06-05 ENCOUNTER — OFFICE VISIT (OUTPATIENT)
Dept: FAMILY MEDICINE CLINIC | Facility: CLINIC | Age: 79
End: 2024-06-05
Payer: COMMERCIAL

## 2024-06-05 VITALS
HEART RATE: 73 BPM | OXYGEN SATURATION: 97 % | WEIGHT: 159 LBS | BODY MASS INDEX: 25.55 KG/M2 | DIASTOLIC BLOOD PRESSURE: 80 MMHG | SYSTOLIC BLOOD PRESSURE: 110 MMHG | HEIGHT: 66 IN

## 2024-06-05 DIAGNOSIS — R20.0 ANESTHESIA OF SKIN: ICD-10-CM

## 2024-06-05 DIAGNOSIS — Z12.31 SCREENING MAMMOGRAM, ENCOUNTER FOR: ICD-10-CM

## 2024-06-05 DIAGNOSIS — R79.9 ABNORMAL FINDING OF BLOOD CHEMISTRY, UNSPECIFIED: ICD-10-CM

## 2024-06-05 DIAGNOSIS — B00.9 HSV (HERPES SIMPLEX VIRUS) INFECTION: ICD-10-CM

## 2024-06-05 DIAGNOSIS — T14.8XXA SCRATCHES: ICD-10-CM

## 2024-06-05 DIAGNOSIS — R53.83 FATIGUE, UNSPECIFIED TYPE: ICD-10-CM

## 2024-06-05 DIAGNOSIS — F33.41 RECURRENT MAJOR DEPRESSIVE DISORDER, IN PARTIAL REMISSION (HCC): ICD-10-CM

## 2024-06-05 DIAGNOSIS — H60.333 ACUTE SWIMMER'S EAR OF BOTH SIDES: ICD-10-CM

## 2024-06-05 DIAGNOSIS — F43.9 REACTION TO SEVERE STRESS, UNSPECIFIED: ICD-10-CM

## 2024-06-05 DIAGNOSIS — E55.9 VITAMIN D DEFICIENCY: ICD-10-CM

## 2024-06-05 DIAGNOSIS — Z00.00 MEDICARE ANNUAL WELLNESS VISIT, SUBSEQUENT: Primary | ICD-10-CM

## 2024-06-05 DIAGNOSIS — Z00.00 HEALTH CARE MAINTENANCE: ICD-10-CM

## 2024-06-05 DIAGNOSIS — Z13.6 SCREENING FOR CARDIOVASCULAR CONDITION: ICD-10-CM

## 2024-06-05 DIAGNOSIS — R32 URINARY INCONTINENCE, UNSPECIFIED TYPE: ICD-10-CM

## 2024-06-05 DIAGNOSIS — E03.9 HYPOTHYROIDISM, UNSPECIFIED TYPE: ICD-10-CM

## 2024-06-05 PROCEDURE — G0439 PPPS, SUBSEQ VISIT: HCPCS

## 2024-06-05 PROCEDURE — 99214 OFFICE O/P EST MOD 30 MIN: CPT

## 2024-06-05 RX ORDER — ACYCLOVIR 400 MG/1
400 TABLET ORAL 3 TIMES DAILY
Qty: 21 TABLET | Refills: 0 | Status: SHIPPED | OUTPATIENT
Start: 2024-06-05 | End: 2024-06-12

## 2024-06-05 RX ORDER — CIPROFLOXACIN AND DEXAMETHASONE 3; 1 MG/ML; MG/ML
4 SUSPENSION/ DROPS AURICULAR (OTIC) 2 TIMES DAILY
Qty: 7.5 ML | Refills: 0 | Status: SHIPPED | OUTPATIENT
Start: 2024-06-05

## 2024-06-05 NOTE — ASSESSMENT & PLAN NOTE
On mirtazapine, will continue to monitor.  Depression screening today however feels that she is her normal

## 2024-06-05 NOTE — PROGRESS NOTES
Ambulatory Visit  Name: Dixie Shah      : 1945      MRN: [id number]  Encounter Provider: MARTIN Dos Santos  Encounter Date: 2024   Encounter department: [facility] [address] [place] [address]    Assessment & Plan   1. Medicare annual wellness visit, subsequent  Comments:  Updated as appropriate  2. Fatigue, unspecified type  Assessment & Plan:  Have all lab work, will call with results.  Orders:  -     Vitamin B12; Future  3. Hypothyroidism, unspecified type  Assessment & Plan:  Continue current dose of levothyroxine have lab work we will call with results  Orders:  -     TSH, 3rd generation with Free T4 reflex; Future  4. Screening for cardiovascular condition  Comments:  Have lab work and EKG  Orders:  -     CBC and differential; Future  -     Comprehensive metabolic panel; Future  -     Hemoglobin A1C; Future  -     Lipid panel; Future  -     TSH, 3rd generation with Free T4 reflex; Future  -     ECG 12 lead; Future  5. Vitamin D deficiency  Assessment & Plan:  Have lab work, continue supplement.  Will call with results  Orders:  -     Vitamin D 25 hydroxy; Future  6. Recurrent major depressive disorder, in partial remission (HCC)  Assessment & Plan:  On mirtazapine, will continue to monitor.  Depression screening today however feels that she is her normal  7. Reaction to severe stress, unspecified  Comments:  Have lab work we will call with results  Orders:  -     CBC and differential; Future  8. Abnormal finding of blood chemistry, unspecified  Comments:  Have lab work we will call with results  Orders:  -     Hemoglobin A1C; Future  9. Anesthesia of skin  Comments:  Have lab work we will call with results  Orders:  -     Vitamin B12; Future  10. HSV (herpes simplex virus) infection  Assessment & Plan:  Continue acyclovir as needed.  Follow-up as needed  Orders:  -     acyclovir (ZOVIRAX) 400 MG tablet; Take 1 tablet (400 mg total) by mouth 3 (three) times a day for 7  days  11. Acute swimmer's ear of both sides  Comments:  Continue eardrops, return for ear cleaning if necessary.  Orders:  -     ciprofloxacin-dexamethasone (CIPRODEX) otic suspension; Administer 4 drops into both ears 2 (two) times a day  12. Scratches  Comments:  Continue mupirocin as needed  Orders:  -     mupirocin (BACTROBAN) 2 % ointment; Apply topically 3 (three) times a day  13. Screening mammogram, encounter for  Comments:  Please have mammo  Orders:  -     Mammo screening bilateral w 3d & cad; Future  14. Health care maintenance  Comments:  RSV vaccine sent to pharmacy  Orders:  -     RSV Pre-Fusion F A&B Vac Rcmb (Abrysvo) SOLR vaccine; Inject 0.5 mL into a muscle once for 1 dose  15. Urinary incontinence, unspecified type  Comments:  Kegels, limiting carbonation caffeine alcohol spicy foods and citrus fruits.  Will review medications I suggested and let me know if he would like to start 1  Orders:  -     Ambulatory Referral to Urogynecology; Future      Depression Screening and Follow-up Plan: Patient's depression screening was positive with a PHQ-9 score of 5.       Preventive health issues were discussed with patient, and age appropriate screening tests were ordered as noted in patient's After Visit Summary. Personalized health advice and appropriate referrals for health education or preventive services given if needed, as noted in patient's After Visit Summary.    History of Present Illness   {Disappearing Hyperlinks I Encounters * My Last Note * Since Last Visit * History :08536}  Dixie is here for AWV, she is having a lot of bilateral hip/low back pain.        Patient Care Team:  MARTIN Dos Santos as PCP - General (Nurse Practitioner)    Review of Systems   Constitutional:  Positive for fatigue.   Respiratory:  Negative for cough and shortness of breath.    Cardiovascular:  Negative for chest pain and palpitations.   Genitourinary:  Positive for frequency.   Musculoskeletal:  Positive for  arthralgias and myalgias.   Psychiatric/Behavioral:  Positive for dysphoric mood. Negative for sleep disturbance.      Medical History Reviewed by provider this encounter:  Tobacco  Allergies  Meds  Problems  Med Hx  Surg Hx  Fam Hx       Annual Wellness Visit Questionnaire   Dixie is here for her Subsequent Wellness visit.     Health Risk Assessment:   Patient rates overall health as good. Patient feels that their physical health rating is same. Patient is dissatisfied with their life. Eyesight was rated as same. Hearing was rated as same. Patient feels that their emotional and mental health rating is same. Patients states they are sometimes angry. Patient states they are never, rarely unusually tired/fatigued. Pain experienced in the last 7 days has been some. Patient's pain rating has been 5/10. Patient states that she has experienced no weight loss or gain in last 6 months.     Depression Screening:   PHQ-9 Score: 5      Fall Risk Screening:   In the past year, patient has experienced: no history of falling in past year      Urinary Incontinence Screening:   Patient has leaked urine accidently in the last six months.     Home Safety:  Patient does not have trouble with stairs inside or outside of their home. Patient has no working smoke alarms and has no working carbon monoxide detector. Home safety hazards include: none.     Nutrition:   Current diet is Regular.     Medications:   Patient is currently taking over-the-counter supplements. OTC medications include: see medication list. Patient is able to manage medications.     Activities of Daily Living (ADLs)/Instrumental Activities of Daily Living (IADLs):   Walk and transfer into and out of bed and chair?: Yes  Dress and groom yourself?: Yes    Bathe or shower yourself?: Yes    Feed yourself? Yes  Do your laundry/housekeeping?: Yes  Manage your money, pay your bills and track your expenses?: Yes  Make your own meals?: Yes    Do your own shopping?:  Yes    Previous Hospitalizations:   Any hospitalizations or ED visits within the last 12 months?: No      Advance Care Planning:   Living will: No    Advanced directive: Yes    Advanced directive counseling given: Yes    ACP document given: Yes    Patient declined ACP directive: No    End of Life Decisions reviewed with patient: Yes    Provider agrees with end of life decisions: Yes      Comments: Has a sister in [place] and a neighbor she will discuss with.     PREVENTIVE SCREENINGS      Cardiovascular Screening:    General: Screening Current    Due for: Lipid Panel      Diabetes Screening:       Due for: Blood Glucose      Breast Cancer Screening:     General: Screening Current      Cervical Cancer Screening:    General: Screening Not Indicated      Osteoporosis Screening:    General: Screening Not Indicated and History Osteoporosis      Lung Cancer Screening:     General: Screening Not Indicated    Screening, Brief Intervention, and Referral to Treatment (SBIRT)    Screening  Typical number of drinks in a day: 1  Typical number of drinks in a week: 7  Interpretation: Low risk drinking behavior.    Single Item Drug Screening:  How often have you used an illegal drug (including marijuana) or a prescription medication for non-medical reasons in the past year? never    Single Item Drug Screen Score: 0  Interpretation: Negative screen for possible drug use disorder    Brief Intervention  Alcohol & drug use screenings were reviewed. No concerns regarding substance use disorder identified.     Other Counseling Topics:   Car/seat belt/driving safety and calcium and vitamin D intake.     Social Determinants of Health     Financial Resource Strain: Low Risk  (2/14/2023)    Overall Financial Resource Strain (CARDIA)    • Difficulty of Paying Living Expenses: Not hard at all   Food Insecurity: No Food Insecurity (6/5/2024)    Hunger Vital Sign    • Worried About Running Out of Food in the Last Year: Never true    • Ran Out of  "Food in the Last Year: Never true   Transportation Needs: No Transportation Needs (6/5/2024)    PRAPARE - Transportation    • Lack of Transportation (Medical): No    • Lack of Transportation (Non-Medical): No   Housing Stability: Unknown (6/5/2024)    Housing Stability Vital Sign    • Unable to Pay for Housing in the Last Year: No    • Homeless in the Last Year: No   Utilities: Not At Risk (6/5/2024)    [facility] Utilities    • Threatened with loss of utilities: No     No results found.    Objective   {Disappearing Hyperlinks   Review Vitals * Enter New Vitals * Results Review * Labs * Imaging * Cardiology * Procedures * Lung Cancer Screening :65540}  /80   Pulse 73   Ht 5' 6\" (1.676 m)   Wt 72.1 kg (159 lb)   SpO2 97%   BMI 25.66 kg/m²     Physical Exam  Vitals and nursing note reviewed.   Constitutional:       General: She is not in acute distress.     Appearance: Normal appearance. She is well-developed. She is not ill-appearing.   HENT:      Head: Normocephalic and atraumatic.      Right Ear: Tympanic membrane, ear canal and external ear normal. There is no impacted cerumen.      Left Ear: Tympanic membrane, ear canal and external ear normal. There is no impacted cerumen.      Nose: Nose normal. No congestion or rhinorrhea.      Mouth/Throat:      Mouth: Mucous membranes are moist.      Pharynx: No posterior oropharyngeal erythema.   Eyes:      Extraocular Movements: Extraocular movements intact.      Conjunctiva/sclera: Conjunctivae normal.      Pupils: Pupils are equal, round, and reactive to light.   Cardiovascular:      Rate and Rhythm: Normal rate and regular rhythm.      Heart sounds: No murmur heard.  Pulmonary:      Effort: Pulmonary effort is normal. No respiratory distress.      Breath sounds: Normal breath sounds.   Abdominal:      Palpations: Abdomen is soft.      Tenderness: There is no abdominal tenderness.   Musculoskeletal:         General: No swelling.      Cervical back: Normal range of " motion and neck supple.      Right lower leg: No edema.      Left lower leg: No edema.   Lymphadenopathy:      Cervical: No cervical adenopathy.   Skin:     General: Skin is warm and dry.      Capillary Refill: Capillary refill takes less than 2 seconds.   Neurological:      General: No focal deficit present.      Mental Status: She is alert.   Psychiatric:         Mood and Affect: Mood normal.         Behavior: Behavior normal.       Administrative Statements {Disappearing Hyperlinks I  Level of Service * PCM/PCSP:21267}

## 2024-06-05 NOTE — PATIENT INSTRUCTIONS
Medicare Preventive Visit Patient Instructions  Thank you for completing your Welcome to Medicare Visit or Medicare Annual Wellness Visit today. Your next wellness visit will be due in one year (6/6/2025).  The screening/preventive services that you may require over the next 5-10 years are detailed below. Some tests may not apply to you based off risk factors and/or age. Screening tests ordered at today's visit but not completed yet may show as past due. Also, please note that scanned in results may not display below.  Preventive Screenings:  Service Recommendations Previous Testing/Comments   Colorectal Cancer Screening  * Colonoscopy    * Fecal Occult Blood Test (FOBT)/Fecal Immunochemical Test (FIT)  * Fecal DNA/Cologuard Test  * Flexible Sigmoidoscopy Age: 45-75 years old   Colonoscopy: every 10 years (may be performed more frequently if at higher risk)  OR  FOBT/FIT: every 1 year  OR  Cologuard: every 3 years  OR  Sigmoidoscopy: every 5 years  Screening may be recommended earlier than age 45 if at higher risk for colorectal cancer. Also, an individualized decision between you and your healthcare provider will decide whether screening between the ages of 76-85 would be appropriate. Colonoscopy: Not on file  FOBT/FIT: Not on file  Cologuard: Not on file  Sigmoidoscopy: Not on file          Breast Cancer Screening Age: 40+ years old  Frequency: every 1-2 years  Not required if history of left and right mastectomy Mammogram: 03/17/2023    Screening Current   Cervical Cancer Screening Between the ages of 21-29, pap smear recommended once every 3 years.   Between the ages of 30-65, can perform pap smear with HPV co-testing every 5 years.   Recommendations may differ for women with a history of total hysterectomy, cervical cancer, or abnormal pap smears in past. Pap Smear: Not on file    Screening Not Indicated   Hepatitis C Screening Once for adults born between 1945 and 1965  More frequently in patients at high risk  for Hepatitis C Hep C Antibody: Not on file        Diabetes Screening 1-2 times per year if you're at risk for diabetes or have pre-diabetes Fasting glucose: 99 mg/dL (6/17/2022)  A1C: No results in last 5 years (No results in last 5 years)      Cholesterol Screening Once every 5 years if you don't have a lipid disorder. May order more often based on risk factors. Lipid panel: 06/17/2022    Screening Current     Other Preventive Screenings Covered by Medicare:  Abdominal Aortic Aneurysm (AAA) Screening: covered once if your at risk. You're considered to be at risk if you have a family history of AAA.  Lung Cancer Screening: covers low dose CT scan once per year if you meet all of the following conditions: (1) Age 55-77; (2) No signs or symptoms of lung cancer; (3) Current smoker or have quit smoking within the last 15 years; (4) You have a tobacco smoking history of at least 20 pack years (packs per day multiplied by number of years you smoked); (5) You get a written order from a healthcare provider.  Glaucoma Screening: covered annually if you're considered high risk: (1) You have diabetes OR (2) Family history of glaucoma OR (3)  aged 50 and older OR (4)  American aged 65 and older  Osteoporosis Screening: covered every 2 years if you meet one of the following conditions: (1) You're estrogen deficient and at risk for osteoporosis based off medical history and other findings; (2) Have a vertebral abnormality; (3) On glucocorticoid therapy for more than 3 months; (4) Have primary hyperparathyroidism; (5) On osteoporosis medications and need to assess response to drug therapy.   Last bone density test (DXA Scan): 02/20/2023.  HIV Screening: covered annually if you're between the age of 15-65. Also covered annually if you are younger than 15 and older than 65 with risk factors for HIV infection. For pregnant patients, it is covered up to 3 times per pregnancy.    Immunizations:  Immunization  Recommendations   Influenza Vaccine Annual influenza vaccination during flu season is recommended for all persons aged >= 6 months who do not have contraindications   Pneumococcal Vaccine   * Pneumococcal conjugate vaccine = PCV13 (Prevnar 13), PCV15 (Vaxneuvance), PCV20 (Prevnar 20)  * Pneumococcal polysaccharide vaccine = PPSV23 (Pneumovax) Adults 19-65 yo with certain risk factors or if 65+ yo  If never received any pneumonia vaccine: recommend Prevnar 20 (PCV20)  Give PCV20 if previously received 1 dose of PCV13 or PPSV23   Hepatitis B Vaccine 3 dose series if at intermediate or high risk (ex: diabetes, end stage renal disease, liver disease)   Respiratory syncytial virus (RSV) Vaccine - COVERED BY MEDICARE PART D  * RSVPreF3 (Arexvy) CDC recommends that adults 60 years of age and older may receive a single dose of RSV vaccine using shared clinical decision-making (SCDM)   Tetanus (Td) Vaccine - COST NOT COVERED BY MEDICARE PART B Following completion of primary series, a booster dose should be given every 10 years to maintain immunity against tetanus. Td may also be given as tetanus wound prophylaxis.   Tdap Vaccine - COST NOT COVERED BY MEDICARE PART B Recommended at least once for all adults. For pregnant patients, recommended with each pregnancy.   Shingles Vaccine (Shingrix) - COST NOT COVERED BY MEDICARE PART B  2 shot series recommended in those 19 years and older who have or will have weakened immune systems or those 50 years and older     Health Maintenance Due:      Topic Date Due   • Hepatitis C Screening  Never done   • Breast Cancer Screening: Mammogram  03/17/2024     Immunizations Due:  There are no preventive care reminders to display for this patient.  Advance Directives   What are advance directives?  Advance directives are legal documents that state your wishes and plans for medical care. These plans are made ahead of time in case you lose your ability to make decisions for yourself. Advance  directives can apply to any medical decision, such as the treatments you want, and if you want to donate organs.   What are the types of advance directives?  There are many types of advance directives, and each state has rules about how to use them. You may choose a combination of any of the following:  Living will:  This is a written record of the treatment you want. You can also choose which treatments you do not want, which to limit, and which to stop at a certain time. This includes surgery, medicine, IV fluid, and tube feedings.   Durable power of  for healthcare (DPAHC):  This is a written record that states who you want to make healthcare choices for you when you are unable to make them for yourself. This person, called a proxy, is usually a family member or a friend. You may choose more than 1 proxy.  Do not resuscitate (DNR) order:  A DNR order is used in case your heart stops beating or you stop breathing. It is a request not to have certain forms of treatment, such as CPR. A DNR order may be included in other types of advance directives.  Medical directive:  This covers the care that you want if you are in a coma, near death, or unable to make decisions for yourself. You can list the treatments you want for each condition. Treatment may include pain medicine, surgery, blood transfusions, dialysis, IV or tube feedings, and a ventilator (breathing machine).  Values history:  This document has questions about your views, beliefs, and how you feel and think about life. This information can help others choose the care that you would choose.  Why are advance directives important?  An advance directive helps you control your care. Although spoken wishes may be used, it is better to have your wishes written down. Spoken wishes can be misunderstood, or not followed. Treatments may be given even if you do not want them. An advance directive may make it easier for your family to make difficult choices about  your care.   Urinary Incontinence   Urinary incontinence (UI)  is when you lose control of your bladder. UI develops because your bladder cannot store or empty urine properly. The 3 most common types of UI are stress incontinence, urge incontinence, or both.  Medicines:   May be given to help strengthen your bladder control. Report any side effects of medication to your healthcare provider.  Do pelvic muscle exercises often:  Your pelvic muscles help you stop urinating. Squeeze these muscles tight for 5 seconds, then relax for 5 seconds. Gradually work up to squeezing for 10 seconds. Do 3 sets of 15 repetitions a day, or as directed. This will help strengthen your pelvic muscles and improve bladder control.  Train your bladder:  Go to the bathroom at set times, such as every 2 hours, even if you do not feel the urge to go. You can also try to hold your urine when you feel the urge to go. For example, hold your urine for 5 minutes when you feel the urge to go. As that becomes easier, hold your urine for 10 minutes.   Self-care:   Keep a UI record.  Write down how often you leak urine and how much you leak. Make a note of what you were doing when you leaked urine.  Drink liquids as directed. You may need to limit the amount of liquid you drink to help control your urine leakage. Do not drink any liquid right before you go to bed. Limit or do not have drinks that contain caffeine or alcohol.   Prevent constipation.  Eat a variety of high-fiber foods. Good examples are high-fiber cereals, beans, vegetables, and whole-grain breads. Walking is the best way to trigger your intestines to have a bowel movement.  Exercise regularly and maintain a healthy weight.  Weight loss and exercise will decrease pressure on your bladder and help you control your leakage.   Use a catheter as directed  to help empty your bladder. A catheter is a tiny, plastic tube that is put into your bladder to drain your urine.   Go to behavior therapy  as directed.  Behavior therapy may be used to help you learn to control your urge to urinate.    Weight Management   Why it is important to manage your weight:  Being overweight increases your risk of health conditions such as heart disease, high blood pressure, type 2 diabetes, and certain types of cancer. It can also increase your risk for osteoarthritis, sleep apnea, and other respiratory problems. Aim for a slow, steady weight loss. Even a small amount of weight loss can lower your risk of health problems.  How to lose weight safely:  A safe and healthy way to lose weight is to eat fewer calories and get regular exercise. You can lose up about 1 pound a week by decreasing the number of calories you eat by 500 calories each day.   Healthy meal plan for weight management:  A healthy meal plan includes a variety of foods, contains fewer calories, and helps you stay healthy. A healthy meal plan includes the following:  Eat whole-grain foods more often.  A healthy meal plan should contain fiber. Fiber is the part of grains, fruits, and vegetables that is not broken down by your body. Whole-grain foods are healthy and provide extra fiber in your diet. Some examples of whole-grain foods are whole-wheat breads and pastas, oatmeal, brown rice, and bulgur.  Eat a variety of vegetables every day.  Include dark, leafy greens such as spinach, kale, babak greens, and mustard greens. Eat yellow and orange vegetables such as carrots, sweet potatoes, and winter squash.   Eat a variety of fruits every day.  Choose fresh or canned fruit (canned in its own juice or light syrup) instead of juice. Fruit juice has very little or no fiber.  Eat low-fat dairy foods.  Drink fat-free (skim) milk or 1% milk. Eat fat-free yogurt and low-fat cottage cheese. Try low-fat cheeses such as mozzarella and other reduced-fat cheeses.  Choose meat and other protein foods that are low in fat.  Choose beans or other legumes such as split peas or  lentils. Choose fish, skinless poultry (chicken or turkey), or lean cuts of red meat (beef or pork). Before you cook meat or poultry, cut off any visible fat.   Use less fat and oil.  Try baking foods instead of frying them. Add less fat, such as margarine, sour cream, regular salad dressing and mayonnaise to foods. Eat fewer high-fat foods. Some examples of high-fat foods include french fries, doughnuts, ice cream, and cakes.  Eat fewer sweets.  Limit foods and drinks that are high in sugar. This includes candy, cookies, regular soda, and sweetened drinks.  Exercise:  Exercise at least 30 minutes per day on most days of the week. Some examples of exercise include walking, biking, dancing, and swimming. You can also fit in more physical activity by taking the stairs instead of the elevator or parking farther away from stores. Ask your healthcare provider about the best exercise plan for you.      © Copyright [employer] 2018 Information is for End User's use only and may not be sold, redistributed or otherwise used for commercial purposes. All illustrations and images included in CareNotes® are the copyrighted property of [employer] or [employer]

## 2024-06-11 ENCOUNTER — TELEPHONE (OUTPATIENT)
Age: 79
End: 2024-06-11

## 2024-06-11 NOTE — TELEPHONE ENCOUNTER
Patient called to find out a [facility]'s lab to get her blood work done, I gave her the lab information for [facility].

## 2024-06-17 ENCOUNTER — APPOINTMENT (OUTPATIENT)
Dept: LAB | Facility: HOSPITAL | Age: 79
End: 2024-06-17
Payer: COMMERCIAL

## 2024-06-17 DIAGNOSIS — Z13.6 SCREENING FOR CARDIOVASCULAR CONDITION: ICD-10-CM

## 2024-06-17 DIAGNOSIS — F43.9 REACTION TO SEVERE STRESS, UNSPECIFIED: ICD-10-CM

## 2024-06-17 DIAGNOSIS — E03.9 HYPOTHYROIDISM, UNSPECIFIED TYPE: ICD-10-CM

## 2024-06-17 DIAGNOSIS — R20.0 ANESTHESIA OF SKIN: ICD-10-CM

## 2024-06-17 DIAGNOSIS — R53.83 FATIGUE, UNSPECIFIED TYPE: ICD-10-CM

## 2024-06-17 DIAGNOSIS — R79.9 ABNORMAL FINDING OF BLOOD CHEMISTRY, UNSPECIFIED: ICD-10-CM

## 2024-06-17 DIAGNOSIS — E55.9 VITAMIN D DEFICIENCY: ICD-10-CM

## 2024-06-17 LAB
25(OH)D3 SERPL-MCNC: 55.6 NG/ML (ref 30–100)
ALBUMIN SERPL BCP-MCNC: 4.4 G/DL (ref 3.5–5)
ALP SERPL-CCNC: 55 U/L (ref 34–104)
ALT SERPL W P-5'-P-CCNC: 27 U/L (ref 7–52)
ANION GAP SERPL CALCULATED.3IONS-SCNC: 6 MMOL/L (ref 4–13)
AST SERPL W P-5'-P-CCNC: 25 U/L (ref 13–39)
BASOPHILS # BLD AUTO: 0.06 THOUSANDS/ÂΜL (ref 0–0.1)
BASOPHILS NFR BLD AUTO: 1 % (ref 0–1)
BILIRUB SERPL-MCNC: 0.66 MG/DL (ref 0.2–1)
BUN SERPL-MCNC: 18 MG/DL (ref 5–25)
CALCIUM SERPL-MCNC: 9.7 MG/DL (ref 8.4–10.2)
CHLORIDE SERPL-SCNC: 107 MMOL/L (ref 96–108)
CHOLEST SERPL-MCNC: 195 MG/DL
CO2 SERPL-SCNC: 30 MMOL/L (ref 21–32)
CREAT SERPL-MCNC: 0.9 MG/DL (ref 0.6–1.3)
EOSINOPHIL # BLD AUTO: 0.49 THOUSAND/ÂΜL (ref 0–0.61)
EOSINOPHIL NFR BLD AUTO: 8 % (ref 0–6)
ERYTHROCYTE [DISTWIDTH] IN BLOOD BY AUTOMATED COUNT: 13.1 % (ref 11.6–15.1)
EST. AVERAGE GLUCOSE BLD GHB EST-MCNC: 123 MG/DL
GFR SERPL CREATININE-BSD FRML MDRD: 61 ML/MIN/1.73SQ M
GLUCOSE P FAST SERPL-MCNC: 91 MG/DL (ref 65–99)
HBA1C MFR BLD: 5.9 %
HCT VFR BLD AUTO: 46.4 % (ref 34.8–46.1)
HDLC SERPL-MCNC: 72 MG/DL
HGB BLD-MCNC: 15.1 G/DL (ref 11.5–15.4)
IMM GRANULOCYTES # BLD AUTO: 0.01 THOUSAND/UL (ref 0–0.2)
IMM GRANULOCYTES NFR BLD AUTO: 0 % (ref 0–2)
LDLC SERPL CALC-MCNC: 109 MG/DL (ref 0–100)
LYMPHOCYTES # BLD AUTO: 1.95 THOUSANDS/ÂΜL (ref 0.6–4.47)
LYMPHOCYTES NFR BLD AUTO: 34 % (ref 14–44)
MCH RBC QN AUTO: 30.3 PG (ref 26.8–34.3)
MCHC RBC AUTO-ENTMCNC: 32.5 G/DL (ref 31.4–37.4)
MCV RBC AUTO: 93 FL (ref 82–98)
MONOCYTES # BLD AUTO: 0.64 THOUSAND/ÂΜL (ref 0.17–1.22)
MONOCYTES NFR BLD AUTO: 11 % (ref 4–12)
NEUTROPHILS # BLD AUTO: 2.65 THOUSANDS/ÂΜL (ref 1.85–7.62)
NEUTS SEG NFR BLD AUTO: 46 % (ref 43–75)
NONHDLC SERPL-MCNC: 123 MG/DL
NRBC BLD AUTO-RTO: 0 /100 WBCS
PLATELET # BLD AUTO: 225 THOUSANDS/UL (ref 149–390)
PMV BLD AUTO: 9.8 FL (ref 8.9–12.7)
POTASSIUM SERPL-SCNC: 4.2 MMOL/L (ref 3.5–5.3)
PROT SERPL-MCNC: 6.9 G/DL (ref 6.4–8.4)
RBC # BLD AUTO: 4.98 MILLION/UL (ref 3.81–5.12)
SODIUM SERPL-SCNC: 143 MMOL/L (ref 135–147)
T4 FREE SERPL-MCNC: 0.78 NG/DL (ref 0.61–1.12)
TRIGL SERPL-MCNC: 69 MG/DL
TSH SERPL DL<=0.05 MIU/L-ACNC: 7.06 UIU/ML (ref 0.45–4.5)
VIT B12 SERPL-MCNC: 422 PG/ML (ref 180–914)
WBC # BLD AUTO: 5.8 THOUSAND/UL (ref 4.31–10.16)

## 2024-06-17 PROCEDURE — 83036 HEMOGLOBIN GLYCOSYLATED A1C: CPT

## 2024-06-17 PROCEDURE — 82607 VITAMIN B-12: CPT

## 2024-06-17 PROCEDURE — 84439 ASSAY OF FREE THYROXINE: CPT

## 2024-06-17 PROCEDURE — 85025 COMPLETE CBC W/AUTO DIFF WBC: CPT

## 2024-06-17 PROCEDURE — 80061 LIPID PANEL: CPT

## 2024-06-17 PROCEDURE — 82306 VITAMIN D 25 HYDROXY: CPT

## 2024-06-17 PROCEDURE — 36415 COLL VENOUS BLD VENIPUNCTURE: CPT

## 2024-06-17 PROCEDURE — 84443 ASSAY THYROID STIM HORMONE: CPT

## 2024-06-17 PROCEDURE — 80053 COMPREHEN METABOLIC PANEL: CPT

## 2024-06-18 DIAGNOSIS — E03.9 HYPOTHYROIDISM, UNSPECIFIED TYPE: Primary | ICD-10-CM

## 2024-06-29 DIAGNOSIS — F33.41 RECURRENT MAJOR DEPRESSIVE DISORDER, IN PARTIAL REMISSION (HCC): ICD-10-CM

## 2024-06-29 RX ORDER — MIRTAZAPINE 15 MG/1
TABLET, FILM COATED ORAL
Qty: 90 TABLET | Refills: 1 | Status: SHIPPED | OUTPATIENT
Start: 2024-06-29

## 2024-09-11 ENCOUNTER — OFFICE VISIT (OUTPATIENT)
Dept: FAMILY MEDICINE CLINIC | Facility: CLINIC | Age: 79
End: 2024-09-11
Payer: COMMERCIAL

## 2024-09-11 VITALS
BODY MASS INDEX: 24.27 KG/M2 | TEMPERATURE: 97.2 F | DIASTOLIC BLOOD PRESSURE: 72 MMHG | OXYGEN SATURATION: 98 % | HEIGHT: 66 IN | SYSTOLIC BLOOD PRESSURE: 114 MMHG | WEIGHT: 151 LBS | HEART RATE: 68 BPM

## 2024-09-11 DIAGNOSIS — L29.9 SCALP ITCH: ICD-10-CM

## 2024-09-11 DIAGNOSIS — R21 RASH AND NONSPECIFIC SKIN ERUPTION: Primary | ICD-10-CM

## 2024-09-11 DIAGNOSIS — L29.9 ITCHING: ICD-10-CM

## 2024-09-11 PROCEDURE — 99213 OFFICE O/P EST LOW 20 MIN: CPT | Performed by: STUDENT IN AN ORGANIZED HEALTH CARE EDUCATION/TRAINING PROGRAM

## 2024-09-11 PROCEDURE — G2211 COMPLEX E/M VISIT ADD ON: HCPCS | Performed by: STUDENT IN AN ORGANIZED HEALTH CARE EDUCATION/TRAINING PROGRAM

## 2024-09-11 RX ORDER — HYDROXYZINE HYDROCHLORIDE 10 MG/1
10 TABLET, FILM COATED ORAL EVERY 6 HOURS PRN
Qty: 30 TABLET | Refills: 0 | Status: SHIPPED | OUTPATIENT
Start: 2024-09-11

## 2024-09-11 NOTE — PROGRESS NOTES
Assessment/Plan:         Problem List Items Addressed This Visit    None  Visit Diagnoses       Rash and nonspecific skin eruption    -  Primary    Relevant Orders    Ambulatory Referral to Dermatology    Scalp itch        Itching        Hydroxazine as needed for itch    Relevant Medications    hydrOXYzine HCL (ATARAX) 10 mg tablet          Wants to wait to see derm, offered US of the area. Recommend holding daily multivitamins to see if that helps with the topical itching    Subjective:      Patient ID: Dixie Shah is a 79 y.o. female.    HPI    Rash on back (thoracic spine ) cannot reach. Has a spot on the back of the left shoulder. Not always there, will be a rasied bump and she will rub it aggressive with alcohol    Also itches on the head. Hx of alopecia on the head    The following portions of the patient's history were reviewed and updated as appropriate:   Past Medical History:  She has a past medical history of Disease of thyroid gland.,  _______________________________________________________________________  Medical Problems:  does not have any pertinent problems on file.,  _______________________________________________________________________  Past Surgical History:   has a past surgical history that includes Shoulder surgery (Right, 2012); FL injection right shoulder (arthrogram) (11/18/2019); Joint replacement; and Knee surgery (Left, 2021).,  _______________________________________________________________________  Family History:  family history includes Diabetes in her father and sister; Polymyalgia rheumatica in her mother.,  _______________________________________________________________________  Social History:   reports that she quit smoking about 42 years ago. Her smoking use included cigarettes. She has never used smokeless tobacco. She reports current alcohol use of about 1.0 standard drink of alcohol per week. She reports that she does not use  "drugs.,  _______________________________________________________________________  Allergies:  has No Known Allergies..  _______________________________________________________________________  Current Outpatient Medications   Medication Sig Dispense Refill    acyclovir (ZOVIRAX) 400 MG tablet       ciprofloxacin-dexamethasone (CIPRODEX) otic suspension Administer 4 drops into both ears 2 (two) times a day 7.5 mL 0    clobetasol (TEMOVATE) 0.05 % cream Apply topically 2 (two) times a day 60 g 1    COVID-19 At Home Antigen Test (BinaxNOW COVID-19 Ag Home Test) KIT 1 Units into each nostril in the morning 5 kit 1    COVID-19 At Home Antigen Test (BinaxNOW COVID-19 Ag Home Test) KIT 1 Device into each nostril in the morning 5 kit 1    ergocalciferol (VITAMIN D2) 50,000 units TAKE 1 CAPSULE BY MOUTH ONE TIME PER WEEK 12 capsule 1    hydrOXYzine HCL (ATARAX) 10 mg tablet Take 1 tablet (10 mg total) by mouth every 6 (six) hours as needed for itching 30 tablet 0    levothyroxine 75 mcg tablet TAKE 1 TABLET (75 MCG TOTAL) BY MOUTH DAILY IN THE EARLY MORNING 90 tablet 1    mirtazapine (REMERON) 15 mg tablet TAKE 1 TABLET BY MOUTH EVERYDAY AT BEDTIME 90 tablet 1    mupirocin (BACTROBAN) 2 % ointment Apply topically 3 (three) times a day 22 g 0    NON FORMULARY wobenzyme      PreviDent 5000 Booster Plus 1.1 % PSTE USE ONCE A DAY AT BEDTIME      acyclovir (ZOVIRAX) 400 MG tablet Take 1 tablet (400 mg total) by mouth 3 (three) times a day for 7 days 21 tablet 0     No current facility-administered medications for this visit.     _______________________________________________________________________  Review of Systems   Skin:  Positive for rash.         Objective:  Vitals:    09/11/24 1034   BP: 114/72   Pulse: 68   Temp: (!) 97.2 °F (36.2 °C)   SpO2: 98%   Weight: 68.5 kg (151 lb)   Height: 5' 6\" (1.676 m)     Body mass index is 24.37 kg/m².     Physical Exam  Constitutional:       Appearance: Normal appearance.   Pulmonary:     "  Effort: Pulmonary effort is normal.   Skin:     General: Skin is warm.      Capillary Refill: Capillary refill takes less than 2 seconds.      Coloration: Skin is not ashen, cyanotic, jaundiced, mottled, pale or sallow.      Findings: No rash.   Neurological:      Mental Status: She is alert.

## 2024-09-25 DIAGNOSIS — E03.9 HYPOTHYROIDISM, UNSPECIFIED TYPE: ICD-10-CM

## 2024-09-25 RX ORDER — LEVOTHYROXINE SODIUM 75 UG/1
75 TABLET ORAL
Qty: 90 TABLET | Refills: 1 | Status: SHIPPED | OUTPATIENT
Start: 2024-09-25

## 2024-09-26 ENCOUNTER — TELEPHONE (OUTPATIENT)
Age: 79
End: 2024-09-26

## 2024-09-26 ENCOUNTER — APPOINTMENT (OUTPATIENT)
Dept: LAB | Facility: HOSPITAL | Age: 79
End: 2024-09-26
Payer: COMMERCIAL

## 2024-09-26 DIAGNOSIS — E03.9 HYPOTHYROIDISM, UNSPECIFIED TYPE: ICD-10-CM

## 2024-09-26 LAB
T4 FREE SERPL-MCNC: 0.9 NG/DL (ref 0.61–1.12)
TSH SERPL DL<=0.05 MIU/L-ACNC: 4.51 UIU/ML (ref 0.45–4.5)

## 2024-09-26 PROCEDURE — 36415 COLL VENOUS BLD VENIPUNCTURE: CPT

## 2024-09-26 PROCEDURE — 84439 ASSAY OF FREE THYROXINE: CPT

## 2024-09-26 PROCEDURE — 84443 ASSAY THYROID STIM HORMONE: CPT

## 2024-10-08 ENCOUNTER — OFFICE VISIT (OUTPATIENT)
Dept: FAMILY MEDICINE CLINIC | Facility: CLINIC | Age: 79
End: 2024-10-08
Payer: COMMERCIAL

## 2024-10-08 VITALS
OXYGEN SATURATION: 98 % | DIASTOLIC BLOOD PRESSURE: 62 MMHG | HEART RATE: 91 BPM | WEIGHT: 151 LBS | SYSTOLIC BLOOD PRESSURE: 116 MMHG | HEIGHT: 66 IN | BODY MASS INDEX: 24.27 KG/M2

## 2024-10-08 DIAGNOSIS — E55.9 VITAMIN D DEFICIENCY: ICD-10-CM

## 2024-10-08 DIAGNOSIS — E53.8 LOW SERUM VITAMIN B12: ICD-10-CM

## 2024-10-08 DIAGNOSIS — L65.9 ALOPECIA: ICD-10-CM

## 2024-10-08 DIAGNOSIS — R19.4 RECENT CHANGE IN FREQUENCY OF BOWEL MOVEMENTS: ICD-10-CM

## 2024-10-08 DIAGNOSIS — E03.9 HYPOTHYROIDISM, UNSPECIFIED TYPE: ICD-10-CM

## 2024-10-08 DIAGNOSIS — Z00.00 HEALTH CARE MAINTENANCE: ICD-10-CM

## 2024-10-08 DIAGNOSIS — R79.9 ABNORMAL FINDING OF BLOOD CHEMISTRY, UNSPECIFIED: ICD-10-CM

## 2024-10-08 DIAGNOSIS — R39.15 URINARY URGENCY: ICD-10-CM

## 2024-10-08 DIAGNOSIS — C44.310 BASAL CELL CARCINOMA (BCC) OF SKIN OF FACE, UNSPECIFIED PART OF FACE: Primary | ICD-10-CM

## 2024-10-08 LAB
BACTERIA UR QL AUTO: NORMAL /HPF
BILIRUB UR QL STRIP: NEGATIVE
CLARITY UR: CLEAR
COLOR UR: YELLOW
GLUCOSE UR STRIP-MCNC: NEGATIVE MG/DL
HGB UR QL STRIP.AUTO: NEGATIVE
KETONES UR STRIP-MCNC: NEGATIVE MG/DL
LEUKOCYTE ESTERASE UR QL STRIP: NEGATIVE
NITRITE UR QL STRIP: NEGATIVE
NON-SQ EPI CELLS URNS QL MICRO: NORMAL /HPF
PH UR STRIP.AUTO: 6.5 [PH]
PROT UR STRIP-MCNC: NEGATIVE MG/DL
RBC #/AREA URNS AUTO: NORMAL /HPF
SP GR UR STRIP.AUTO: 1.01 (ref 1–1.03)
UROBILINOGEN UR STRIP-ACNC: <2 MG/DL
WBC #/AREA URNS AUTO: NORMAL /HPF

## 2024-10-08 PROCEDURE — 99214 OFFICE O/P EST MOD 30 MIN: CPT

## 2024-10-08 PROCEDURE — 81001 URINALYSIS AUTO W/SCOPE: CPT

## 2024-10-08 PROCEDURE — 87086 URINE CULTURE/COLONY COUNT: CPT

## 2024-10-08 NOTE — PROGRESS NOTES
Assessment/Plan:         Problem List Items Addressed This Visit          Endocrine    Hypothyroidism     Reviewed labs, improving will recheck in 3 months and call with results         Relevant Orders    Comprehensive metabolic panel    Hemoglobin A1C    Lipid panel    TSH, 3rd generation with Free T4 reflex       Musculoskeletal and Integument    Basal cell carcinoma (BCC) of skin of face - Primary     Please follow-up with dermatology         Relevant Orders    Ambulatory Referral to Dermatology    Alopecia     Please follow-up with dermatology         Relevant Orders    Ambulatory Referral to Dermatology    CBC and differential    Iron Panel (Includes Ferritin, Iron Sat%, Iron, and TIBC)       Other    Urinary urgency     Will check urine and refer to pelvic floor physical therapy and also urogynecology.         Relevant Orders    Ambulatory Referral to Urogynecology    Ambulatory Referral to Physical Therapy    Urinalysis with microscopic    Urine culture    Vitamin D deficiency     Will continue to monitor closely         Relevant Orders    Vitamin D 25 hydroxy     Other Visit Diagnoses       Low serum vitamin B12        Reviewed previous lab work Lab work in December we will call with results    Relevant Orders    Vitamin B12    Abnormal finding of blood chemistry, unspecified        Reviewed previous lab work Lab work in December we will call with results    Relevant Orders    Hemoglobin A1C    Recent change in frequency of bowel movements        please do fit testing, will call with results, recent bout of diarrhea with what may have been blood.    Relevant Orders    Occult Blood, Fecal Immunochemical    Health care maintenance        RSV vaccine please    Relevant Medications    RSV Pre-Fusion F A&B Vac Rcmb (Abrysvo) SOLR vaccine              Subjective:      Patient ID: Dixie Shah is a 79 y.o. female.    Dixie is here for follow up. She did have a spot on her shoulder and it resolved. Dixie has changed a  couple of things, she is taking D3 and K2. She no longer has time to swim.       The following portions of the patient's history were reviewed and updated as appropriate:   Past Medical History:  She has a past medical history of Disease of thyroid gland.,  _______________________________________________________________________  Medical Problems:  does not have any pertinent problems on file.,  _______________________________________________________________________  Past Surgical History:   has a past surgical history that includes Shoulder surgery (Right, 2012); FL injection right shoulder (arthrogram) (11/18/2019); Joint replacement; and Knee surgery (Left, 2021).,  _______________________________________________________________________  Family History:  family history includes Diabetes in her father and sister; Polymyalgia rheumatica in her mother.,  _______________________________________________________________________  Social History:   reports that she quit smoking about 42 years ago. Her smoking use included cigarettes. She has never used smokeless tobacco. She reports current alcohol use of about 1.0 standard drink of alcohol per week. She reports that she does not use drugs.,  _______________________________________________________________________  Allergies:  has No Known Allergies..  _______________________________________________________________________  Current Outpatient Medications   Medication Sig Dispense Refill   • RSV Pre-Fusion F A&B Vac Rcmb (Abrysvo) SOLR vaccine Inject 0.5 mL into a muscle once for 1 dose 1 each 0   • acyclovir (ZOVIRAX) 400 MG tablet      • acyclovir (ZOVIRAX) 400 MG tablet Take 1 tablet (400 mg total) by mouth 3 (three) times a day for 7 days 21 tablet 0   • ciprofloxacin-dexamethasone (CIPRODEX) otic suspension Administer 4 drops into both ears 2 (two) times a day 7.5 mL 0   • clobetasol (TEMOVATE) 0.05 % cream Apply topically 2 (two) times a day 60 g 1   • COVID-19 At  Home Antigen Test (BinaxNOW COVID-19 Ag Home Test) KIT 1 Units into each nostril in the morning 5 kit 1   • COVID-19 At Home Antigen Test (BinaxNOW COVID-19 Ag Home Test) KIT 1 Device into each nostril in the morning 5 kit 1   • ergocalciferol (VITAMIN D2) 50,000 units TAKE 1 CAPSULE BY MOUTH ONE TIME PER WEEK 12 capsule 1   • hydrOXYzine HCL (ATARAX) 10 mg tablet Take 1 tablet (10 mg total) by mouth every 6 (six) hours as needed for itching 30 tablet 0   • levothyroxine 75 mcg tablet TAKE 1 TABLET (75 MCG TOTAL) BY MOUTH DAILY IN THE EARLY MORNING 90 tablet 1   • mirtazapine (REMERON) 15 mg tablet TAKE 1 TABLET BY MOUTH EVERYDAY AT BEDTIME 90 tablet 1   • mupirocin (BACTROBAN) 2 % ointment Apply topically 3 (three) times a day 22 g 0   • NON FORMULARY wobenzyme     • PreviDent 5000 Booster Plus 1.1 % PSTE USE ONCE A DAY AT BEDTIME       No current facility-administered medications for this visit.     _______________________________________________________________________  Review of Systems   Constitutional:  Negative for chills, diaphoresis and fever.   HENT:  Negative for congestion, ear pain, sinus pressure, sinus pain and sore throat.    Eyes:  Negative for pain and visual disturbance.   Respiratory:  Negative for cough, chest tightness, shortness of breath and wheezing.    Cardiovascular:  Negative for chest pain and palpitations.   Gastrointestinal:  Positive for diarrhea. Negative for abdominal pain, nausea and vomiting. Anal bleeding: urogyn.  Genitourinary:  Positive for urgency. Negative for dysuria, frequency and hematuria.   Musculoskeletal:  Positive for arthralgias.   Neurological:  Negative for dizziness, syncope and light-headedness.   Psychiatric/Behavioral:  Negative for dysphoric mood and sleep disturbance. The patient is not nervous/anxious.    All other systems reviewed and are negative.        Objective:  Vitals:    10/08/24 0856   BP: 116/62   Pulse: 91   SpO2: 98%   Weight: 68.5 kg (151 lb)  "  Height: 5' 6\" (1.676 m)     Body mass index is 24.37 kg/m².     Physical Exam  Vitals and nursing note reviewed.   Constitutional:       Appearance: Normal appearance. She is not ill-appearing.   HENT:      Right Ear: Tympanic membrane, ear canal and external ear normal. There is no impacted cerumen.      Left Ear: Tympanic membrane, ear canal and external ear normal. There is no impacted cerumen.      Nose: Nose normal. No congestion.      Mouth/Throat:      Mouth: Mucous membranes are moist.      Pharynx: No posterior oropharyngeal erythema.   Eyes:      Extraocular Movements: Extraocular movements intact.      Conjunctiva/sclera: Conjunctivae normal.      Pupils: Pupils are equal, round, and reactive to light.   Cardiovascular:      Rate and Rhythm: Normal rate and regular rhythm.      Heart sounds: Normal heart sounds. No murmur heard.  Pulmonary:      Effort: Pulmonary effort is normal.      Breath sounds: Normal breath sounds. No wheezing.   Abdominal:      Palpations: Abdomen is soft.      Tenderness: There is no abdominal tenderness.   Musculoskeletal:         General: Normal range of motion.      Cervical back: Normal range of motion.      Right lower leg: No edema.      Left lower leg: No edema.   Skin:     General: Skin is warm and dry.   Neurological:      General: No focal deficit present.      Mental Status: She is alert.   Psychiatric:         Mood and Affect: Mood normal.         Behavior: Behavior normal.       "

## 2024-10-09 LAB — BACTERIA UR CULT: NORMAL

## 2024-10-31 ENCOUNTER — OFFICE VISIT (OUTPATIENT)
Dept: FAMILY MEDICINE CLINIC | Facility: CLINIC | Age: 79
End: 2024-10-31
Payer: COMMERCIAL

## 2024-10-31 VITALS
WEIGHT: 150 LBS | DIASTOLIC BLOOD PRESSURE: 78 MMHG | SYSTOLIC BLOOD PRESSURE: 120 MMHG | BODY MASS INDEX: 24.11 KG/M2 | HEIGHT: 66 IN

## 2024-10-31 DIAGNOSIS — H60.333 ACUTE SWIMMER'S EAR OF BOTH SIDES: ICD-10-CM

## 2024-10-31 DIAGNOSIS — H61.21 HEARING LOSS OF RIGHT EAR DUE TO CERUMEN IMPACTION: Primary | ICD-10-CM

## 2024-10-31 PROCEDURE — 99214 OFFICE O/P EST MOD 30 MIN: CPT

## 2024-10-31 PROCEDURE — 69210 REMOVE IMPACTED EAR WAX UNI: CPT

## 2024-10-31 RX ORDER — CIPROFLOXACIN AND DEXAMETHASONE 3; 1 MG/ML; MG/ML
4 SUSPENSION/ DROPS AURICULAR (OTIC) 2 TIMES DAILY
Qty: 7.5 ML | Refills: 0 | Status: SHIPPED | OUTPATIENT
Start: 2024-10-31

## 2024-10-31 NOTE — PROGRESS NOTES
Assessment/Plan:         Problem List Items Addressed This Visit    None  Visit Diagnoses       Hearing loss of right ear due to cerumen impaction    -  Primary    Cleaned today, recommend follow-up with ear nose and throat will send Ciprodex twice a day every day for 5 days.    Relevant Orders    Ambulatory Referral to Otolaryngology    Acute swimmer's ear of both sides        Cleaned today, recommend follow-up with ear nose and throat will send Ciprodex twice a day every day for 5 days.    Relevant Medications    ciprofloxacin-dexamethasone (CIPRODEX) otic suspension              Subjective:      Patient ID: Dixie Shah is a 79 y.o. female.    Dixie is here for an abundance of sinus issues. She has post nasal drip. She has had onging hearing loss in the right ear that is always troublesome and now is achy, last night and the night before she used peroxide. She feels disoriented from the imbalance.         The following portions of the patient's history were reviewed and updated as appropriate:   Past Medical History:  She has a past medical history of Disease of thyroid gland.,  _______________________________________________________________________  Medical Problems:  does not have any pertinent problems on file.,  _______________________________________________________________________  Past Surgical History:   has a past surgical history that includes Shoulder surgery (Right, 2012); FL injection right shoulder (arthrogram) (11/18/2019); Joint replacement; and Knee surgery (Left, 2021).,  _______________________________________________________________________  Family History:  family history includes Diabetes in her father and sister; Polymyalgia rheumatica in her mother.,  _______________________________________________________________________  Social History:   reports that she quit smoking about 42 years ago. Her smoking use included cigarettes. She has never used smokeless tobacco. She reports current alcohol  use of about 1.0 standard drink of alcohol per week. She reports that she does not use drugs.,  _______________________________________________________________________  Allergies:  has No Known Allergies..  _______________________________________________________________________  Current Outpatient Medications   Medication Sig Dispense Refill    ciprofloxacin-dexamethasone (CIPRODEX) otic suspension Administer 4 drops into both ears 2 (two) times a day 7.5 mL 0    acyclovir (ZOVIRAX) 400 MG tablet       acyclovir (ZOVIRAX) 400 MG tablet Take 1 tablet (400 mg total) by mouth 3 (three) times a day for 7 days 21 tablet 0    clobetasol (TEMOVATE) 0.05 % cream Apply topically 2 (two) times a day 60 g 1    COVID-19 At Home Antigen Test ([facility] Test) KIT 1 Units into each nostril in the morning 5 kit 1    COVID-19 At Home Antigen Test ([facility] Test) KIT 1 Device into each nostril in the morning 5 kit 1    ergocalciferol (VITAMIN D2) 50,000 units TAKE 1 CAPSULE BY MOUTH ONE TIME PER WEEK 12 capsule 1    hydrOXYzine HCL (ATARAX) 10 mg tablet Take 1 tablet (10 mg total) by mouth every 6 (six) hours as needed for itching 30 tablet 0    levothyroxine 75 mcg tablet TAKE 1 TABLET (75 MCG TOTAL) BY MOUTH DAILY IN THE EARLY MORNING 90 tablet 1    mirtazapine (REMERON) 15 mg tablet TAKE 1 TABLET BY MOUTH EVERYDAY AT BEDTIME 90 tablet 1    mupirocin (BACTROBAN) 2 % ointment Apply topically 3 (three) times a day 22 g 0    NON FORMULARY wobenzyme      PreviDent 5000 Booster Plus 1.1 % PSTE USE ONCE A DAY AT BEDTIME       No current facility-administered medications for this visit.     _______________________________________________________________________  Review of Systems   Constitutional:  Negative for fever.   HENT:  Positive for congestion, ear pain, rhinorrhea, sinus pressure, sinus pain and sore throat.    Respiratory:  Negative for cough, chest tightness and shortness of breath.    All other  "systems reviewed and are negative.        Objective:  Vitals:    10/31/24 0953   BP: 120/78   Weight: 68 kg (150 lb)   Height: 5' 6\" (1.676 m)     Body mass index is 24.21 kg/m².     Physical Exam  Vitals and nursing note reviewed.   Constitutional:       Appearance: Normal appearance. She is not ill-appearing.   HENT:      Head: Normocephalic.      Right Ear: Tympanic membrane normal. There is impacted cerumen.      Left Ear: Tympanic membrane normal. There is impacted cerumen.      Nose: Nose normal.      Mouth/Throat:      Mouth: Mucous membranes are moist.      Pharynx: No posterior oropharyngeal erythema.   Eyes:      Extraocular Movements: Extraocular movements intact.   Cardiovascular:      Rate and Rhythm: Normal rate and regular rhythm.      Heart sounds: Normal heart sounds. No murmur heard.  Pulmonary:      Effort: Pulmonary effort is normal.      Breath sounds: Normal breath sounds. No wheezing.   Abdominal:      Palpations: Abdomen is soft.      Tenderness: There is no abdominal tenderness.   Musculoskeletal:         General: No tenderness. Normal range of motion.      Cervical back: Normal range of motion.      Right lower leg: No edema.      Left lower leg: No edema.   Skin:     General: Skin is warm and dry.   Neurological:      General: No focal deficit present.      Mental Status: She is alert.   Psychiatric:         Mood and Affect: Mood normal.         Behavior: Behavior normal.       Ear cerumen removal    Date/Time: 10/31/2024 9:40 AM    Performed by: MARTIN Dos Santos  Authorized by: MARTIN Dos Santos  Universal Protocol:  procedure performed by consultantConsent: Verbal consent obtained.  Risks and benefits: risks, benefits and alternatives were discussed  Consent given by: patient  Time out: Immediately prior to procedure a \"time out\" was called to verify the correct patient, procedure, equipment, support staff and site/side marked as required.  Timeout called at: 10/31/2024 10:23 " AM.  Patient understanding: patient states understanding of the procedure being performed  Required items: required blood products, implants, devices, and special equipment available  Patient identity confirmed: verbally with patient    Patient location:  Clinic  Procedure details:     Local anesthetic:  None    Location:  R ear    Procedure type: irrigation with instrumentation      Instrumentation: curette      Approach:  External  Post-procedure details:     Complication:  None    Hearing quality:  Improved    Patient tolerance of procedure:  Tolerated well, no immediate complications

## 2024-11-20 ENCOUNTER — TELEPHONE (OUTPATIENT)
Dept: DERMATOLOGY | Facility: CLINIC | Age: 79
End: 2024-11-20

## 2024-11-20 NOTE — TELEPHONE ENCOUNTER
Called patient from referral pushpa. LOS that our office received a referral from her Dr's office to schedule her an appointment with  Dermatology, if you are still interested in making an appointment please give our office a call at [phone]. (ASAP Referral from 10/08/24 for Basal cell carcinoma (BCC) of skin of face, unspecified part of face). Pt is not a newp, she can be sched at [facility]Dr Nagel.

## 2024-11-25 NOTE — TELEPHONE ENCOUNTER
Patient returning  call to be scheduled/ patient is very upset that our first available apt is in April/scheduled  Asked  patient for reason for visit/advised it's new concern and a yearly chk  Patient didn't have a good experience last visit and doesn't wish to go to  again.  Offered different locations/providers for sooner apt/declined  Patient didn't have time to stay on the phone and stated she had to hang up.

## 2024-12-21 DIAGNOSIS — F33.41 RECURRENT MAJOR DEPRESSIVE DISORDER, IN PARTIAL REMISSION (HCC): ICD-10-CM

## 2024-12-23 RX ORDER — MIRTAZAPINE 15 MG/1
15 TABLET, FILM COATED ORAL
Qty: 90 TABLET | Refills: 1 | Status: SHIPPED | OUTPATIENT
Start: 2024-12-23

## 2025-01-14 ENCOUNTER — HOSPITAL ENCOUNTER (OUTPATIENT)
Dept: RADIOLOGY | Facility: HOSPITAL | Age: 80
Discharge: HOME | End: 2025-01-14
Attending: OTOLARYNGOLOGY
Payer: COMMERCIAL

## 2025-01-14 DIAGNOSIS — Z87.09 HISTORY OF SINUSITIS: ICD-10-CM

## 2025-01-14 DIAGNOSIS — J34.2 DEVIATED NASAL SEPTUM: ICD-10-CM

## 2025-01-14 DIAGNOSIS — J31.0 CHRONIC RHINITIS: ICD-10-CM

## 2025-01-14 PROCEDURE — 70486 CT MAXILLOFACIAL W/O DYE: CPT

## 2025-02-24 ENCOUNTER — TELEPHONE (OUTPATIENT)
Age: 80
End: 2025-02-24

## 2025-02-24 NOTE — TELEPHONE ENCOUNTER
Received return call from Patient. Patient asked for 4:30 4/29/25 slot. Reminded Patient that she was previously informed by Tonia DODSON that we would not hold that slot. Slot was unavailable.    Patient is very upset with the rescheduling/lack of availability of appts. Patient verbalized she is going to [facility] because they can get her in sooner. Cancelled 4/1/25 appt per patient's request.

## 2025-02-24 NOTE — TELEPHONE ENCOUNTER
Spoke with pt regarding rescheduling of derm appt needed from 4/1/25 to 4/29/25   pt declined scheduling of this appt and stated she will call back if she remembers to reschedule     pt was offered 4:30PM 4/29/25 but declined and was made fully aware that this appt will only be available for reschedule to pt until 4pm today 2/24/25    please contact office to reschedule pt if needed

## 2025-03-06 ENCOUNTER — PATIENT MESSAGE (OUTPATIENT)
Dept: FAMILY MEDICINE CLINIC | Facility: CLINIC | Age: 80
End: 2025-03-06

## 2025-03-06 ENCOUNTER — OFFICE VISIT (OUTPATIENT)
Dept: FAMILY MEDICINE CLINIC | Facility: CLINIC | Age: 80
End: 2025-03-06
Payer: COMMERCIAL

## 2025-03-06 VITALS
HEIGHT: 66 IN | DIASTOLIC BLOOD PRESSURE: 70 MMHG | WEIGHT: 145 LBS | BODY MASS INDEX: 23.3 KG/M2 | OXYGEN SATURATION: 99 % | SYSTOLIC BLOOD PRESSURE: 126 MMHG | HEART RATE: 66 BPM

## 2025-03-06 DIAGNOSIS — M54.50 LUMBAR SPINE PAIN: Primary | ICD-10-CM

## 2025-03-06 DIAGNOSIS — E28.39 OVARIAN FAILURE: ICD-10-CM

## 2025-03-06 DIAGNOSIS — M25.551 RIGHT HIP PAIN: ICD-10-CM

## 2025-03-06 DIAGNOSIS — N80.9 ENDOMETRIOSIS: ICD-10-CM

## 2025-03-06 PROCEDURE — 99214 OFFICE O/P EST MOD 30 MIN: CPT

## 2025-03-06 PROCEDURE — 96372 THER/PROPH/DIAG INJ SC/IM: CPT

## 2025-03-06 RX ORDER — KETOROLAC TROMETHAMINE 30 MG/ML
30 INJECTION, SOLUTION INTRAMUSCULAR; INTRAVENOUS ONCE
Status: DISCONTINUED | OUTPATIENT
Start: 2025-03-06 | End: 2025-03-06

## 2025-03-06 RX ORDER — KETOROLAC TROMETHAMINE 30 MG/ML
60 INJECTION, SOLUTION INTRAMUSCULAR; INTRAVENOUS ONCE
Status: COMPLETED | OUTPATIENT
Start: 2025-03-06 | End: 2025-03-06

## 2025-03-06 RX ORDER — BETAMETHASONE DIPROPIONATE 0.5 MG/ML
LOTION, AUGMENTED TOPICAL
COMMUNITY
Start: 2025-01-28

## 2025-03-06 RX ADMIN — KETOROLAC TROMETHAMINE 60 MG: 30 INJECTION, SOLUTION INTRAMUSCULAR; INTRAVENOUS at 14:13

## 2025-03-06 NOTE — ASSESSMENT & PLAN NOTE
Lumbar spine pain, patient has can pleated physical therapy positive straight leg raise no loss of bladder or bowel will do CAT scan and call with results Toradol shot today recommend chiropractics and ice.  Would like patient to follow-up with sports medicine.  Orders:  •  CT spine lumbar w wo contrast; Future  •  Ambulatory Referral to Orthopedic Surgery; Future  •  ketorolac (TORADOL) 60 mg/2 mL IM injection 60 mg  •  Ambulatory Referral to Chiropractic; Future

## 2025-03-06 NOTE — PROGRESS NOTES
Name: Dixie Shah      : 1945      MRN: [id number]  Encounter Provider: MARTIN Dos Santos  Encounter Date: 3/6/2025   Encounter department: [facility] [address]  :  Assessment & Plan  Lumbar spine pain  Lumbar spine pain, patient has can pleated physical therapy positive straight leg raise no loss of bladder or bowel will do CAT scan and call with results Toradol shot today recommend chiropractics and ice.  Would like patient to follow-up with sports medicine.  Orders:  •  CT spine lumbar w wo contrast; Future  •  Ambulatory Referral to Orthopedic Surgery; Future  •  ketorolac (TORADOL) 60 mg/2 mL IM injection 60 mg  •  Ambulatory Referral to Chiropractic; Future    Right hip pain  Lumbar spine pain, patient has can pleated physical therapy positive straight leg raise no loss of bladder or bowel will do CAT scan and call with results Toradol shot today recommend chiropractics and ice.  Would like patient to follow-up with sports medicine.  Orders:  •  CT spine lumbar w wo contrast; Future  •  Ambulatory Referral to Orthopedic Surgery; Future  •  ketorolac (TORADOL) 60 mg/2 mL IM injection 60 mg  •  Ambulatory Referral to Chiropractic; Future    Endometriosis  Patient reports history of endometriosis now with right low pelvic pain will do CAT scan we will call with results   Orders:  •  CT pelvis w wo contrast; Future    Ovarian failure  DEXA due  Orders:  •  DXA bone density spine hip and pelvis; Future           History of Present Illness   Dixie is here for right sided hip pain. She is did take naproxen and had symptom improvement without resolution. She did have a CT of her spine at one time and there was abnormality in the low spine. She also is doing a keto diet. She about 15 days ago, fell on ice and hit her right knee and pelvis. She was using arnica and colloidal peptides and glucosamine. The knee pain is improved.     Hip Pain       Review of Systems  "  Musculoskeletal:  Positive for arthralgias, back pain and myalgias.   All other systems reviewed and are negative.      Objective   /70   Pulse 66   Ht 5' 6\" (1.676 m)   Wt 65.8 kg (145 lb)   SpO2 99%   BMI 23.40 kg/m²      Physical Exam  Vitals and nursing note reviewed.   Constitutional:       General: She is not in acute distress.     Appearance: Normal appearance. She is well-developed. She is not ill-appearing.   HENT:      Head: Normocephalic and atraumatic.      Right Ear: Tympanic membrane, ear canal and external ear normal. There is no impacted cerumen.      Left Ear: Tympanic membrane, ear canal and external ear normal. There is no impacted cerumen.      Nose: Nose normal. No congestion or rhinorrhea.      Mouth/Throat:      Mouth: Mucous membranes are moist.      Pharynx: No posterior oropharyngeal erythema.   Eyes:      Extraocular Movements: Extraocular movements intact.      Conjunctiva/sclera: Conjunctivae normal.      Pupils: Pupils are equal, round, and reactive to light.   Cardiovascular:      Rate and Rhythm: Normal rate and regular rhythm.      Pulses: Normal pulses.      Heart sounds: Normal heart sounds. No murmur heard.  Pulmonary:      Effort: Pulmonary effort is normal. No respiratory distress.      Breath sounds: Normal breath sounds.   Abdominal:      General: Bowel sounds are normal. There is no distension.      Palpations: Abdomen is soft.      Tenderness: There is no abdominal tenderness.   Musculoskeletal:         General: Tenderness present. No swelling. Normal range of motion.      Cervical back: Normal range of motion and neck supple. No tenderness.      Right lower leg: No edema.      Left lower leg: No edema.   Lymphadenopathy:      Cervical: No cervical adenopathy.   Skin:     General: Skin is warm and dry.      Capillary Refill: Capillary refill takes less than 2 seconds.   Neurological:      General: No focal deficit present.      Mental Status: She is alert and " oriented to person, place, and time.   Psychiatric:         Mood and Affect: Mood normal.         Behavior: Behavior normal.         Thought Content: Thought content normal.         Judgment: Judgment normal.

## 2025-03-06 NOTE — PATIENT COMMUNICATION
Pt called stating she was having hip pain starting last night. She wanted to discuss treatment options with Mckayla, scheduled her an appointment to be seen today at 1120.

## 2025-03-07 ENCOUNTER — TELEPHONE (OUTPATIENT)
Age: 80
End: 2025-03-07

## 2025-03-07 NOTE — TELEPHONE ENCOUNTER
Thomas from [employer] called. She had questions in regards to the appointments she scheduled for the patient.     Appointments scheduled:  CT SPINE LUMBAR W WO CON  - 03/15  CT PELVIS W WO CON  -  03/19    She just wanted some clarification about instructions. Since it's no contrast, will barium not be needed? She just wants patient to be clear on instructions. Please advise. Thank you!     Dixie: [phone]

## 2025-03-08 ENCOUNTER — APPOINTMENT (OUTPATIENT)
Dept: LAB | Facility: HOSPITAL | Age: 80
End: 2025-03-08
Payer: COMMERCIAL

## 2025-03-08 ENCOUNTER — HOSPITAL ENCOUNTER (OUTPATIENT)
Dept: RADIOLOGY | Facility: HOSPITAL | Age: 80
End: 2025-03-08
Payer: COMMERCIAL

## 2025-03-08 DIAGNOSIS — L65.9 ALOPECIA: ICD-10-CM

## 2025-03-08 DIAGNOSIS — R19.4 RECENT CHANGE IN FREQUENCY OF BOWEL MOVEMENTS: ICD-10-CM

## 2025-03-08 DIAGNOSIS — E55.9 VITAMIN D DEFICIENCY: ICD-10-CM

## 2025-03-08 DIAGNOSIS — E03.9 HYPOTHYROIDISM, UNSPECIFIED TYPE: ICD-10-CM

## 2025-03-08 DIAGNOSIS — R79.9 ABNORMAL FINDING OF BLOOD CHEMISTRY, UNSPECIFIED: ICD-10-CM

## 2025-03-08 DIAGNOSIS — E53.8 LOW SERUM VITAMIN B12: ICD-10-CM

## 2025-03-10 ENCOUNTER — TELEPHONE (OUTPATIENT)
Dept: FAMILY MEDICINE CLINIC | Facility: CLINIC | Age: 80
End: 2025-03-10

## 2025-03-10 ENCOUNTER — DOCUMENTATION (OUTPATIENT)
Dept: FAMILY MEDICINE CLINIC | Facility: CLINIC | Age: 80
End: 2025-03-10

## 2025-03-10 DIAGNOSIS — M54.50 LUMBAR SPINE PAIN: Primary | ICD-10-CM

## 2025-03-10 DIAGNOSIS — M25.551 RIGHT HIP PAIN: ICD-10-CM

## 2025-03-10 NOTE — TELEPHONE ENCOUNTER
Follow up call placed to Bhavna to inform her orders were placed in her chart for Xray's to be completed at this time. Also did speak to Mckayla regarding Toradol and epidural shots, per Mckayla she would like for Bhavna to have her lab work completed as soon as she can before either injection is given. Order for labs were submitted in her chart from visit on 3/8/25.     Unable to reach bhavna via telephone will attempt again.     Please advise

## 2025-03-10 NOTE — TELEPHONE ENCOUNTER
Spoke to POD regarding patient requesting Xray to be completed is in severe pain at this time, also inquiring about possibly having a Toradol shot done for the time being. Did personally reach out to Mckayla on her call awaiting response from her

## 2025-03-10 NOTE — TELEPHONE ENCOUNTER
Pt called to follow up on xray order. Stated she in excruciating  pain and needs to get all these test done ASAP. Stated she went to get xray done and was told no xray order was in placed. Pt stated medication for pain naproxen works only for 2 hrs. She wants to speak to provider about Epidoral injections. Pt requested call back when xray order is placed.     Please advise, thank you.

## 2025-03-10 NOTE — TELEPHONE ENCOUNTER
Called patient to make her aware that she needed no barium, she said she is in so much pain and the medication is not helping her. She said she went for the x-ray but the ordered was canceled so they couldn't find the order. She is asking if there is anything else she can take other then naproxen. She also wanted to speak with you but I did let her know you were not in the office and tried to help her in anyway I could. She is now thinking about having the epidural injections because she doesn't know what to do and would like to talk to you about this.

## 2025-03-11 ENCOUNTER — TELEPHONE (OUTPATIENT)
Dept: PAIN MEDICINE | Facility: CLINIC | Age: 80
End: 2025-03-11

## 2025-03-11 ENCOUNTER — HOSPITAL ENCOUNTER (OUTPATIENT)
Dept: RADIOLOGY | Facility: HOSPITAL | Age: 80
Discharge: HOME | End: 2025-03-11
Payer: COMMERCIAL

## 2025-03-11 ENCOUNTER — APPOINTMENT (OUTPATIENT)
Dept: LAB | Facility: HOSPITAL | Age: 80
End: 2025-03-11
Payer: COMMERCIAL

## 2025-03-11 DIAGNOSIS — M25.561 CHRONIC PAIN OF RIGHT KNEE: ICD-10-CM

## 2025-03-11 DIAGNOSIS — M54.50 LUMBAR SPINE PAIN: ICD-10-CM

## 2025-03-11 DIAGNOSIS — M25.551 RIGHT HIP PAIN: ICD-10-CM

## 2025-03-11 DIAGNOSIS — G89.29 CHRONIC PAIN OF RIGHT KNEE: ICD-10-CM

## 2025-03-11 LAB
25(OH)D3 SERPL-MCNC: >120 NG/ML (ref 30–100)
ALBUMIN SERPL BCG-MCNC: 4.7 G/DL (ref 3.5–5)
ALP SERPL-CCNC: 70 U/L (ref 34–104)
ALT SERPL W P-5'-P-CCNC: 28 U/L (ref 7–52)
ANION GAP SERPL CALCULATED.3IONS-SCNC: 8 MMOL/L (ref 4–13)
AST SERPL W P-5'-P-CCNC: 26 U/L (ref 13–39)
BASOPHILS # BLD AUTO: 0.06 THOUSANDS/ÂΜL (ref 0–0.1)
BASOPHILS NFR BLD AUTO: 1 % (ref 0–1)
BILIRUB SERPL-MCNC: 0.55 MG/DL (ref 0.2–1)
BUN SERPL-MCNC: 22 MG/DL (ref 5–25)
CALCIUM SERPL-MCNC: 10.8 MG/DL (ref 8.4–10.2)
CHLORIDE SERPL-SCNC: 99 MMOL/L (ref 96–108)
CHOLEST SERPL-MCNC: 221 MG/DL (ref ?–200)
CO2 SERPL-SCNC: 30 MMOL/L (ref 21–32)
CREAT SERPL-MCNC: 0.96 MG/DL (ref 0.6–1.3)
EOSINOPHIL # BLD AUTO: 0.26 THOUSAND/ÂΜL (ref 0–0.61)
EOSINOPHIL NFR BLD AUTO: 4 % (ref 0–6)
ERYTHROCYTE [DISTWIDTH] IN BLOOD BY AUTOMATED COUNT: 13.2 % (ref 11.6–15.1)
EST. AVERAGE GLUCOSE BLD GHB EST-MCNC: 114 MG/DL
FERRITIN SERPL-MCNC: 168 NG/ML (ref 11–307)
GFR SERPL CREATININE-BSD FRML MDRD: 57 ML/MIN/1.73SQ M
GLUCOSE P FAST SERPL-MCNC: 82 MG/DL (ref 65–99)
HBA1C MFR BLD: 5.6 %
HCT VFR BLD AUTO: 44.6 % (ref 34.8–46.1)
HDLC SERPL-MCNC: 72 MG/DL
HGB BLD-MCNC: 14.4 G/DL (ref 11.5–15.4)
IMM GRANULOCYTES # BLD AUTO: 0.03 THOUSAND/UL (ref 0–0.2)
IMM GRANULOCYTES NFR BLD AUTO: 1 % (ref 0–2)
IRON SATN MFR SERPL: 20 % (ref 15–50)
IRON SERPL-MCNC: 69 UG/DL (ref 50–212)
LDLC SERPL CALC-MCNC: 127 MG/DL (ref 0–100)
LYMPHOCYTES # BLD AUTO: 2.22 THOUSANDS/ÂΜL (ref 0.6–4.47)
LYMPHOCYTES NFR BLD AUTO: 34 % (ref 14–44)
MCH RBC QN AUTO: 29.4 PG (ref 26.8–34.3)
MCHC RBC AUTO-ENTMCNC: 32.3 G/DL (ref 31.4–37.4)
MCV RBC AUTO: 91 FL (ref 82–98)
MONOCYTES # BLD AUTO: 0.57 THOUSAND/ÂΜL (ref 0.17–1.22)
MONOCYTES NFR BLD AUTO: 9 % (ref 4–12)
NEUTROPHILS # BLD AUTO: 3.36 THOUSANDS/ÂΜL (ref 1.85–7.62)
NEUTS SEG NFR BLD AUTO: 51 % (ref 43–75)
NONHDLC SERPL-MCNC: 149 MG/DL
NRBC BLD AUTO-RTO: 0 /100 WBCS
PLATELET # BLD AUTO: 202 THOUSANDS/UL (ref 149–390)
PMV BLD AUTO: 10.5 FL (ref 8.9–12.7)
POTASSIUM SERPL-SCNC: 4.1 MMOL/L (ref 3.5–5.3)
PROT SERPL-MCNC: 7.2 G/DL (ref 6.4–8.4)
RBC # BLD AUTO: 4.9 MILLION/UL (ref 3.81–5.12)
SODIUM SERPL-SCNC: 137 MMOL/L (ref 135–147)
T4 FREE SERPL-MCNC: 1.15 NG/DL (ref 0.61–1.12)
TIBC SERPL-MCNC: 344.4 UG/DL (ref 250–450)
TRANSFERRIN SERPL-MCNC: 246 MG/DL (ref 203–362)
TRIGL SERPL-MCNC: 112 MG/DL (ref ?–150)
TSH SERPL DL<=0.05 MIU/L-ACNC: 8.69 UIU/ML (ref 0.45–4.5)
UIBC SERPL-MCNC: 275 UG/DL (ref 155–355)
VIT B12 SERPL-MCNC: 933 PG/ML (ref 180–914)
WBC # BLD AUTO: 6.5 THOUSAND/UL (ref 4.31–10.16)

## 2025-03-11 PROCEDURE — 72110 X-RAY EXAM L-2 SPINE 4/>VWS: CPT

## 2025-03-11 PROCEDURE — 36415 COLL VENOUS BLD VENIPUNCTURE: CPT

## 2025-03-11 PROCEDURE — 83540 ASSAY OF IRON: CPT

## 2025-03-11 PROCEDURE — 82728 ASSAY OF FERRITIN: CPT

## 2025-03-11 PROCEDURE — 84439 ASSAY OF FREE THYROXINE: CPT

## 2025-03-11 PROCEDURE — 80053 COMPREHEN METABOLIC PANEL: CPT

## 2025-03-11 PROCEDURE — 85025 COMPLETE CBC W/AUTO DIFF WBC: CPT

## 2025-03-11 PROCEDURE — 80061 LIPID PANEL: CPT

## 2025-03-11 PROCEDURE — 73564 X-RAY EXAM KNEE 4 OR MORE: CPT

## 2025-03-11 PROCEDURE — 82607 VITAMIN B-12: CPT

## 2025-03-11 PROCEDURE — 83550 IRON BINDING TEST: CPT

## 2025-03-11 PROCEDURE — 84443 ASSAY THYROID STIM HORMONE: CPT

## 2025-03-11 PROCEDURE — 82306 VITAMIN D 25 HYDROXY: CPT

## 2025-03-11 PROCEDURE — 83036 HEMOGLOBIN GLYCOSYLATED A1C: CPT

## 2025-03-11 PROCEDURE — 73521 X-RAY EXAM HIPS BI 2 VIEWS: CPT

## 2025-03-11 NOTE — TELEPHONE ENCOUNTER
Called patient and she is aware of the x-ray order and will have it done. When told the orders was in she said oh yeah finally. Then I also let her know about the referral for spine and pain and she let me know that apt was 20 days away, she said you know what just hang up the phone because the pain is unbearable and she was just frustrated that everything is taking so long.

## 2025-03-12 ENCOUNTER — RESULTS FOLLOW-UP (OUTPATIENT)
Dept: FAMILY MEDICINE CLINIC | Facility: CLINIC | Age: 80
End: 2025-03-12

## 2025-03-12 DIAGNOSIS — R93.89 ABNORMAL FINDINGS ON DIAGNOSTIC IMAGING OF OTHER SPECIFIED BODY STRUCTURES: ICD-10-CM

## 2025-03-12 DIAGNOSIS — R79.89 ABNORMAL TSH: Primary | ICD-10-CM

## 2025-03-13 ENCOUNTER — RESULTS FOLLOW-UP (OUTPATIENT)
Dept: FAMILY MEDICINE CLINIC | Facility: CLINIC | Age: 80
End: 2025-03-13

## 2025-03-13 ENCOUNTER — OFFICE VISIT (OUTPATIENT)
Dept: OBGYN CLINIC | Facility: CLINIC | Age: 80
End: 2025-03-13
Payer: COMMERCIAL

## 2025-03-13 VITALS — WEIGHT: 144 LBS | HEIGHT: 66 IN | BODY MASS INDEX: 23.14 KG/M2

## 2025-03-13 DIAGNOSIS — M47.816 OSTEOARTHRITIS OF LUMBAR SPINE, UNSPECIFIED SPINAL OSTEOARTHRITIS COMPLICATION STATUS: Primary | ICD-10-CM

## 2025-03-13 DIAGNOSIS — M70.61 TROCHANTERIC BURSITIS OF RIGHT HIP: ICD-10-CM

## 2025-03-13 PROCEDURE — 99214 OFFICE O/P EST MOD 30 MIN: CPT | Performed by: FAMILY MEDICINE

## 2025-03-13 RX ORDER — GLUCOSAMINE SULFATE 500 MG
500 CAPSULE ORAL
COMMUNITY

## 2025-03-13 RX ORDER — CYST/ALA/Q10/PHOS.SER/DHA/BROC 100-20-50
POWDER (GRAM) ORAL
COMMUNITY

## 2025-03-13 RX ORDER — LEVOTHYROXINE SODIUM 75 UG/1
75 TABLET ORAL DAILY
COMMUNITY

## 2025-03-13 NOTE — PROGRESS NOTES
Chief Complaint   Patient presents with    Spine - Pain     Hx of lumbar pain. Symptoms  have worsen since last 1-3 years, dull ache pain. Rt sided lumbar pain radiating into Rt hip and all around the upper leg, mostly lateral side.         Dixie Shah is a 79 y.o. female with a past medical history for hypothyroidism osteoporosis major depressive disorder who is presenting today for an initial evaluation of lower back and right lower extremity pain.  Patient states that her symptoms of lower back pain have been longstanding and have occurred since her early teenage years.  She has had several episodes of lower back pain which gradually improve with medication and supplements and conservative treatment.  She states that 2 weeks ago she has had recurrence of the back pain symptoms which is now affecting the lateral aspect of her right hip.  She is having difficulty with laying on the affected side.  Has attempted ibuprofen which provide relief.  No numbness or tingling is reported and no bowel or bladder incontinence.  She had previous radiographs performed by her primary care doctor which revealed degenerative changes in the lumbar spine and mild degenerative changes in the hip.    The following portions of the patient's history were reviewed and updated as appropriate: allergies, current medications, past family history, past medical history, past social history, past surgical history and problem list.    Occupation:    Review of Systems   Constitutional: Negative for fever.   HENT: Negative for dental problem and headaches.    Eyes: Negative for vision loss.   Respiratory: Negative for cough and shortness of breath.    Cardiovascular: Negative for leg swelling and palpitations.   Gastrointestinal: Negative for constipation and diarrhea.   Genitourinary: Negative for bladder incontinence and difficulty urinating.   Musculoskeletal: Negative for back pain and difficulty walking.   Skin: Negative for rash and  "ulcer.   Neurological: Negative for dizziness and headaches.   Hem/Lymph/Immuno: Negative for blood clots. Does not bruise/bleed easily.   Psychiatric/Behavioral: Negative for confusion.         Objective:  Ht 5' 6\" (1.676 m)   Wt 65.3 kg (144 lb)   BMI 23.24 kg/m²     Skin: no rashes, lesions, skin discolorations, lacerations  Vasculature: normal popliteal and pedal pulse, normal skin color, normal capillary refill in extremity, no lower extremity edema  Neurologic: Neurologic exam is normal throughout lower extremities, Awake, alert, and oriented x3, no apparent distress.    Neuro: no weakness in the L4-S1 nerve distribution  Musculoskeletal:  Inspection: no observable abnormalities  Palpation positive tenderness to palpation over greater trochanter  ROM: Full flexion and extension of the hip. full internal and external rotation  Special tests: negative FADIR and EVERETTE test       Negative straight leg raise negative slump test  Full range of motion with lumbar flexion extension and rotation  No paraspinal tenderness      Imaging:       Assessment/Plan:  1. Osteoarthritis of lumbar spine, unspecified spinal osteoarthritis complication status (Primary)  Clinical impression is the patient's lower back pain symptoms are arising secondary to osteoarthritis of lumbar spine.  She denies any radicular symptoms and does not have any reproduction of radiculopathy on exam.  Additionally no neurologic deficits or red flag symptoms are reported.  We discussed chronic lower back pain secondary to osteoarthritis and I am recommending the patient begin with physical therapy for spine conditioning and strengthening.  We discussed following up in 2 months for reevaluation and can consider further imaging at that time if there is no improvement.  She does have an upcoming CT scan of the lumbar spine ordered however we discussed I do not know if this would provide much utility outside of the x-ray that was done.  - Ambulatory " Referral to Orthopedic Surgery  - Ambulatory Referral to Physical Therapy; Future    2. Trochanteric bursitis of right hip  Patient has a component of trochanteric bursitis of the right hip with palpation over this area.  Continue to work with physical therapy for this issue.  Discussed role for cortisone injection however declined.  Follow-up again in 8 weeks for reevaluation  - Ambulatory Referral to Orthopedic Surgery  - Ambulatory Referral to Physical Therapy; Future

## 2025-03-15 ENCOUNTER — HOSPITAL ENCOUNTER (OUTPATIENT)
Dept: CT IMAGING | Facility: CLINIC | Age: 80
Discharge: HOME | End: 2025-03-15
Payer: COMMERCIAL

## 2025-03-15 DIAGNOSIS — M54.50 LUMBAR SPINE PAIN: ICD-10-CM

## 2025-03-15 DIAGNOSIS — N80.9 ENDOMETRIOSIS: ICD-10-CM

## 2025-03-15 DIAGNOSIS — M25.551 RIGHT HIP PAIN: ICD-10-CM

## 2025-03-15 PROCEDURE — 72193 CT PELVIS W/DYE: CPT

## 2025-03-15 PROCEDURE — 72132 CT LUMBAR SPINE W/DYE: CPT

## 2025-03-15 RX ADMIN — IOHEXOL 50 ML: 350 INJECTION, SOLUTION INTRAVENOUS at 13:15

## 2025-03-18 ENCOUNTER — RESULTS FOLLOW-UP (OUTPATIENT)
Dept: FAMILY MEDICINE CLINIC | Facility: CLINIC | Age: 80
End: 2025-03-18

## 2025-03-21 ENCOUNTER — RESULTS FOLLOW-UP (OUTPATIENT)
Dept: FAMILY MEDICINE CLINIC | Facility: CLINIC | Age: 80
End: 2025-03-21

## 2025-03-21 NOTE — TELEPHONE ENCOUNTER
Patient called back in and stated she would like to speak with the provider and is asking if the provider could give her a call personally regarding this results as she thought it was inflammation please advise. Did offer to grab a nurse but only wanted to speak with provider

## 2025-03-24 DIAGNOSIS — H60.333 ACUTE SWIMMER'S EAR OF BOTH SIDES: ICD-10-CM

## 2025-03-24 DIAGNOSIS — E03.9 HYPOTHYROIDISM, UNSPECIFIED TYPE: Primary | ICD-10-CM

## 2025-03-25 NOTE — TELEPHONE ENCOUNTER
Patient called to check status of request, advised in process. She needs tonight as she is completely out of the medication.  Confirmed [facility]

## 2025-03-26 ENCOUNTER — CONSULT (OUTPATIENT)
Dept: PAIN MEDICINE | Facility: CLINIC | Age: 80
End: 2025-03-26
Payer: COMMERCIAL

## 2025-03-26 VITALS — HEIGHT: 66 IN | BODY MASS INDEX: 23.14 KG/M2 | WEIGHT: 144 LBS

## 2025-03-26 DIAGNOSIS — M54.50 CHRONIC BILATERAL LOW BACK PAIN WITHOUT SCIATICA: ICD-10-CM

## 2025-03-26 DIAGNOSIS — M25.551 RIGHT HIP PAIN: ICD-10-CM

## 2025-03-26 DIAGNOSIS — G89.29 CHRONIC BILATERAL LOW BACK PAIN WITHOUT SCIATICA: ICD-10-CM

## 2025-03-26 PROCEDURE — 99204 OFFICE O/P NEW MOD 45 MIN: CPT | Performed by: STUDENT IN AN ORGANIZED HEALTH CARE EDUCATION/TRAINING PROGRAM

## 2025-03-26 RX ORDER — NAPROXEN 250 MG/1
220 TABLET ORAL 2 TIMES DAILY WITH MEALS
COMMUNITY

## 2025-03-26 RX ORDER — LEVOTHYROXINE SODIUM 75 UG/1
75 TABLET ORAL DAILY
Qty: 90 TABLET | Refills: 3 | Status: SHIPPED | OUTPATIENT
Start: 2025-03-26

## 2025-03-26 RX ORDER — CIPROFLOXACIN AND DEXAMETHASONE 3; 1 MG/ML; MG/ML
4 SUSPENSION/ DROPS AURICULAR (OTIC) 2 TIMES DAILY
Qty: 7.5 ML | Refills: 0 | Status: SHIPPED | OUTPATIENT
Start: 2025-03-26

## 2025-03-26 NOTE — TELEPHONE ENCOUNTER
Patient came into the office requesting the status on this medication refill request. She states she is out of her levothyroxine and needs her dose this morning.     I did make her aware that the refill request is still pending. She said this should have been sent in since she called yesterday for it. I did make her aware that the providers have up to 72 hours to refill medications. I advised her to call when she is almost out of medication so she will have her refill before she runs out.

## 2025-03-26 NOTE — PATIENT INSTRUCTIONS
"Patient Education     Bursitis   The Basics   Written by the doctors and editors at [facility]   What is bursitis? -- This is a condition that can cause pain or swelling next to a joint. Bursitis usually happens around the shoulder, elbow, hip, or knee. It can also happen around other joints in the body.  A \"bursa\" is a small fluid-filled sac that sits near a bone. It cushions and protects nearby tissues when they rub on or slide over bones. These sacs, called \"bursae,\" are found in many places throughout the body (figure 1 and figure 2). Bursitis happens when a bursa gets irritated and swollen. This can happen when a person:   Moves a joint over and over again in the same way, over a short period of time   Rests part of their body on a hard surface, or stays in a position that presses on the bursa for a long time   Has certain kinds of arthritis, such as gout or rheumatoid arthritis, that can affect their joints and bursae   Gets hurt near a bursa   Has an infection that spreads to a bursa  What are the symptoms of bursitis? -- Symptoms can include:   Pain or tenderness   Swelling   Trouble moving the joint  A bursa can get infected if a person gets a cut on the skin nearby. An infected bursa can cause a fever and cause the area around the bursa to be:   Red   Swollen   Warm   Painful  If you have any symptoms of an infected bursa, tell your doctor or nurse as soon as possible.  Is there a test for bursitis? -- Yes. Your doctor or nurse will ask about your symptoms and do an exam.  If you have symptoms of an infected bursa, your doctor might use a needle to remove some fluid from the bursa. Then, they can do lab tests on the fluid to find out what is causing the bursitis and see if you need antibiotics.  They might also order imaging tests, such as an MRI scan or ultrasound. Imaging tests can create pictures of the inside of the body.  How can I treat my bursitis? -- You can:   Rest, cushion, and protect the area - " "Try not to irritate the area that hurts. For example, people with very painful shoulder bursitis might need to avoid lifting or carrying heavy things for a while. They might also need to wear an arm sling. People with bursitis behind the heel might need to use a thick heel pad. This can raise the heel so it does not rub against the back of the shoe.   Avoid positions that put pressure on the area - For example, if you have bursitis in the front of your knee, avoid kneeling.   Put ice on the area to reduce pain - Use a cold gel pack, bag of ice, or bag of frozen vegetable a few times a day for 20 minutes each time.   Put heat on the area to reduce pain and stiffness - Do not use heat for more than 20 minutes at a time. Also, do not use anything too hot that could burn your skin.  What other treatments might I have? -- Your doctor or nurse might use other treatments, depending on your symptoms and where your bursitis is. Treatments can include:   Pain-relieving medicines called nonsteroidal antiinflammatory drugs (\"NSAIDs\") - NSAIDs include ibuprofen (sample brand names: Advil, Motrin), and naproxen (sample brand name: Aleve). These medicines can reduce pain and prevent the bursae from getting swollen and painful.   Steroid injections - Steroid medicines help reduce inflammation. Doctors can inject steroids into the area of the bursitis to help reduce symptoms.   Exercises and stretches - Your doctor or nurse might recommend that you work with a physical therapist. A physical therapist can teach you stretches and exercises to help reduce your symptoms.   Surgery - Your doctor might recommend surgery if other treatments do not work and you have had symptoms for a long time.  People with an infected bursa might also have treatment that includes:   Antibiotics   Having the fluid in the bursa drained - A doctor can drain the fluid using a needle and syringe, or by doing surgery.  Can bursitis be prevented? -- Yes. To help " "lower your chances of getting bursitis, you can:   Use cushions or pads to avoid putting too much pressure on joints - For example, people who garden can kneel on a kneeling pad. People who sit for a long time can sit on a cushioned chair.   Take breaks, if you are using a certain joint too much.   Stop an activity or change the way you are doing it, if you feel pain.   Exercise.   Lose weight, if you have excess body weight.   Use good posture.  All topics are updated as new evidence becomes available and our peer review process is complete.  This topic retrieved from [employer] on: Apr 10, 2024.  Topic 69688 Version 19.0  Release: 32.3.2 - C32.99  © 2024 UpToDate, Inc. and/or its affiliates. All rights reserved.  figure 1: Bursae near the hip     These sacs, called \"bursae,\" are filled with fluid. They help cushion and protect the joints. \"Bursitis\" is the medical term for when 1 of these sacs gets irritated or inflamed.  Graphic 87402 Version 8.0  figure 2: Knee bursa (prepatellar bursa)     Graphic 99322 Version 3.0  Consumer Information Use and Disclaimer   Disclaimer: This generalized information is a limited summary of diagnosis, treatment, and/or medication information. It is not meant to be comprehensive and should be used as a tool to help the user understand and/or assess potential diagnostic and treatment options. It does NOT include all information about conditions, treatments, medications, side effects, or risks that may apply to a specific patient. It is not intended to be medical advice or a substitute for the medical advice, diagnosis, or treatment of a health care provider based on the health care provider's examination and assessment of a patient's specific and unique circumstances. Patients must speak with a health care provider for complete information about their health, medical questions, and treatment options, including any risks or benefits regarding use of medications. This information does " not endorse any treatments or medications as safe, effective, or approved for treating a specific patient. UpToDate, Inc. and its affiliates disclaim any warranty or liability relating to this information or the use thereof.The use of this information is governed by the Terms of Use, available at https://www.[employer].com/en/know/clinical-effectiveness-terms. 2024© UpToDate, Inc. and its affiliates and/or licensors. All rights reserved.  Copyright   © 2024 UpToDate, Inc. and/or its affiliates. All rights reserved.

## 2025-03-26 NOTE — PROGRESS NOTES
Assessment:  1. Chronic bilateral low back pain without sciatica    2. Right hip pain        Plan:  No orders of the defined types were placed in this encounter.      New Medications Ordered This Visit   Medications    naproxen (NAPROSYN) 250 mg tablet     Sig: Take 220 mg by mouth 2 (two) times a day with meals       My impressions and treatment recommendations were discussed in detail with the patient, who verbalized understanding and had no further questions.    79-year-old female with history of chronic back pain reports with increased lower back pain as well as right lateral hip pain and right thigh pain.  This was after a fall.  However her symptoms have greatly resolved with conservative care.  She is neurologically intact on exam today and no significant tenderness over the right greater trochanteric bursa.  I did explain that acute exacerbation of her lower back issues seems to be resolving and that it is likely that her greater trochanteric bursitis issue has resolved as well.  However she is scheduled for physical therapy which I encouraged her to continue for core strengthening and prevention of further acute episodes of low back pain.  I also reviewed CT scan with her which shows degenerative facet disease without severe central or foraminal narrowing.  She will return in 6 to 8 weeks following PT or sooner if medically necessary.    [place] Prescription Drug Monitoring Program report was reviewed and was appropriate     Complete risks and benefits including bleeding, infection, tissue reaction, nerve injury and allergic reaction were discussed. The approach was demonstrated using models and literature was provided. Verbal and written consent was obtained.    Discharge instructions were provided. I personally saw and examined the patient and I agree with the above discussed plan of care.    History of Present Illness:    Dixie Shah is a 79 y.o. female who presents to [facility] for initial evaluation of the above stated pain complaints. The patient has a past medical and chronic pain history as outlined in the assessment section. She was referred by MARTIN Dos Santos  [address] [place] [id number].    Patient here with chief complaint of right lateral hip pain.  Also has history of lumbosacral pain for several years.    Over the past month, pain was previously severe however has diminished considerably.  Now it is occasional.  She describes it as burning, sharp, dull/aching.    Pain is increased with lying down.    Reports relief with naproxen.    She has seen Dr. Wilson.  Was offered greater trochanteric bursa injection which she declined.  Was given referral to physical therapy.    Review of Systems:    Review of Systems   Endocrine: Positive for polydipsia and polyuria.   Skin:  Positive for rash and wound.   Psychiatric/Behavioral:  Positive for decreased concentration.         Depression            Past Medical History:   Diagnosis Date    Disease of thyroid gland        Past Surgical History:   Procedure Laterality Date    FL INJECTION RIGHT SHOULDER (ARTHROGRAM)  2019    JOINT REPLACEMENT      KNEE SURGERY Left     SHOULDER SURGERY Right     orif       Family History   Problem Relation Age of Onset    Polymyalgia rheumatica Mother     Diabetes Father     Diabetes Sister        Social History     Occupational History    Not on file   Tobacco Use    Smoking status: Former     Current packs/day: 0.00     Types: Cigarettes     Quit date:      Years since quittin.2    Smokeless tobacco: Never   Substance and Sexual Activity    Alcohol use: Yes     Alcohol/week: 1.0 standard drink of alcohol     Types: 1 Glasses of wine per week    Drug use: Never    Sexual activity: Not on file         Current Outpatient Medications:     ciprofloxacin-dexamethasone (CIPRODEX) otic suspension, Administer 4 drops into both ears 2 (two) times a day, Disp: 7.5  "mL, Rfl: 0    clobetasol (TEMOVATE) 0.05 % cream, Apply topically 2 (two) times a day, Disp: 60 g, Rfl: 1    levothyroxine 75 mcg tablet, Take 75 mcg by mouth daily, Disp: , Rfl:     mirtazapine (REMERON) 15 mg tablet, TAKE 1 TABLET BY MOUTH EVERYDAY AT BEDTIME, Disp: 90 tablet, Rfl: 1    naproxen (NAPROSYN) 250 mg tablet, Take 220 mg by mouth 2 (two) times a day with meals, Disp: , Rfl:     PreviDent 5000 Booster Plus 1.1 % PSTE, USE ONCE A DAY AT BEDTIME, Disp: , Rfl:     acyclovir (ZOVIRAX) 400 MG tablet, if needed, Disp: , Rfl:     acyclovir (ZOVIRAX) 400 MG tablet, Take 1 tablet (400 mg total) by mouth 3 (three) times a day for 7 days, Disp: 21 tablet, Rfl: 0    betamethasone, augmented, (DIPROLENE) 0.05 % lotion, PLEASE SEE ATTACHED FOR DETAILED DIRECTIONS, Disp: , Rfl:     COVID-19 At Home Antigen Test (BinaxNOW COVID-19 Ag Home Test) KIT, 1 Units into each nostril in the morning, Disp: 5 kit, Rfl: 1    COVID-19 At Home Antigen Test (BinaxNOW COVID-19 Ag Home Test) KIT, 1 Device into each nostril in the morning, Disp: 5 kit, Rfl: 1    ergocalciferol (VITAMIN D2) 50,000 units, TAKE 1 CAPSULE BY MOUTH ONE TIME PER WEEK (Patient not taking: Reported on 3/13/2025), Disp: 12 capsule, Rfl: 1    glucosamine 500 MG CAPS capsule, Take 500 mg by mouth, Disp: , Rfl:     hydrOXYzine HCL (ATARAX) 10 mg tablet, Take 1 tablet (10 mg total) by mouth every 6 (six) hours as needed for itching (Patient not taking: Reported on 3/13/2025), Disp: 30 tablet, Rfl: 0    mupirocin (BACTROBAN) 2 % ointment, Apply topically 3 (three) times a day (Patient not taking: Reported on 3/13/2025), Disp: 22 g, Rfl: 0    NON FORMULARY, wobenzyme (Patient not taking: Reported on 3/13/2025), Disp: , Rfl:     Vitamin D-Vitamin K (K2-D3 10,000) 62349-54 UNIT-MCG CAPS, Take by mouth, Disp: , Rfl:     No Known Allergies    Physical Exam:    Ht 5' 6\" (1.676 m)   Wt 65.3 kg (144 lb)   BMI 23.24 kg/m²     Constitutional: normal, well developed, well " nourished, alert, in no distress and non-toxic and no overt pain behavior.  Eyes: anicteric  HEENT: grossly intact  Neck: supple, symmetric, trachea midline and no masses   Pulmonary:even and unlabored  Cardiovascular:No edema or pitting edema present  Skin:Normal without rashes or lesions and well hydrated  Psychiatric:Mood and affect appropriate  Neurologic:Cranial Nerves II-XII grossly intact  Musculoskeletal:normal    Lumbar Spine Exam    Appearance:  Normal lordosis  Palpation/Tenderness:  no tenderness or spasm  Sensory:  no sensory deficits noted  Range of Motion:  Full range of motion with no pain or limitations in flexion, extension, lateral flexion and rotation  Motor Strength:  Left hip flexion:  5/5  Left hip extension:  5/5  Right hip flexion:  5/5  Right hip extension:  5/5  Left knee flexion:  5/5  Left knee extension:  5/5  Right knee flexion:  5/5  Right knee extension:  5/5  Left foot dorsiflexion:  5/5  Left foot plantar flexion:  5/5  Right foot dorsiflexion:  5/5  Right foot plantar flexion:  5/5  Reflexes:  Left Patellar:  2+   Right Patellar:  2+   Left Achilles:  2+   Right Achilles:  2+   Special Tests:  Left Straight Leg Test:  negative  Right Straight Leg Test:  negative        Imaging    CT LUMBAR SPINE WITHOUT CONTRAST          3/15/25     INDICATION:   M54.50: Low back pain, unspecified  M25.551: Pain in right hip.     COMPARISON: None.     TECHNIQUE:  Contiguous axial images through the lumbar spine were obtained. Sagittal and coronal reconstructions were performed.     IV Contrast: 50 mL of iohexol     Radiation dose length product (DLP) for this visit:  456 mGy-cm .  This examination, like all CT scans performed in the [facility], was performed utilizing techniques to minimize radiation dose exposure, including the use of iterative   reconstruction and automated exposure control.     IMAGE QUALITY:  Diagnostic.     FINDINGS:     ALIGNMENT:  There are 5 lumbar type  vertebral bodies. There is retrolisthesis of L2-L3. There is mild anterolisthesis of L5-S1.     VERTEBRAE: The vertebral body heights are maintained. There is a Schmorl's node along the superior endplate of T12.     DEGENERATIVE CHANGES:     Lower Thoracic spine:  Normal lower thoracic disc spaces.     Lumbar Spine:  L1-2: Minimal bulge. No significant canal stenosis or foraminal narrowing.     L2-3: Mild bulge. Mild canal stenosis. No significant foraminal narrowing.     L3-4: Mild bulge. Facet arthrosis. Mild canal stenosis. Mild bilateral foraminal narrowing.     L4-5: Bulging annulus. Facet arthrosis. Marginal osteophytosis. Mild canal stenosis. There is mild to moderate bilateral foraminal narrowing.     L5-S1: There is uncovering of the disc space. There is facet arthrosis. There is no significant canal stenosis or foraminal narrowing.     PARASPINAL SOFT TISSUES:  Normal.     OTHER: None.     IMPRESSION:     Degenerative changes of the lumbar spine, as described above.     Workstation performed: [id number]           No orders to display       No orders of the defined types were placed in this encounter.     Metronidazole Counseling:  I discussed with the patient the risks of metronidazole including but not limited to seizures, nausea/vomiting, a metallic taste in the mouth, nausea/vomiting and severe allergy.

## 2025-04-04 NOTE — PROGRESS NOTES
New Patient Progress Note      Referring Provider  Tamar Dos Santos  [address] [place] [address]  GLENDA Rosario [address]     1. Hypothyroidism due to Hashimoto thyroiditis  -     TSH, 3rd generation; Future; Expected date: 04/22/2025  -     T4, free; Future; Expected date: 04/22/2025  -     Comprehensive metabolic panel; Future; Expected date: 04/22/2025  -     Celiac Disease Comprehensive Panel; Future; Expected date: 04/22/2025  2. Abnormal TSH  -     Ambulatory Referral to Endocrinology  -     TSH, 3rd generation; Future; Expected date: 04/22/2025  -     T4, free; Future; Expected date: 04/22/2025  -     Vitamin B12; Future; Expected date: 04/22/2025  -     Anti-thyroglobulin antibody; Future; Expected date: 04/22/2025  -     Anti-microsomal antibody; Future; Expected date: 04/22/2025  -     Comprehensive metabolic panel; Future; Expected date: 04/22/2025  -     Celiac Disease Comprehensive Panel; Future; Expected date: 04/22/2025  3. Poisoning by vitamin D, accidental or unintentional, initial encounter  -     Vitamin D 25 hydroxy; Future; Expected date: 04/22/2025  -     Comprehensive metabolic panel; Future; Expected date: 04/22/2025  4. Age related osteoporosis, unspecified pathological fracture presence  -     Vitamin D 25 hydroxy; Future; Expected date: 04/22/2025  -     PTH, intact; Future; Expected date: 04/22/2025  -     Comprehensive metabolic panel; Future; Expected date: 04/22/2025  5. Neuropathy, idiopathic  -     Vitamin B12; Future; Expected date: 04/22/2025    Reviewed labs with the patient; it appears that she has had normal thyroid function testing in the past with chronic stable dose of levothyroxine at 75 mcg every morning which she is taking appropriately.  However with recent addition of multiple herbal supplements containing biotin, iodine sources and thyroid support formula there has been interference with her thyroid function leading to abnormal thyroid function results and  symptoms.  Continue current dose of levothyroxine 75 mcg every morning  Advised patient to hold all supplements besides vitamin C, magnesium, zinc, Cosamin and chondroitin for at least 2 weeks and get lab work done in 2 weeks including repeat TSH, free T4 vitamin B12, thyroglobulin antibody and TPO, celiac panel and vitamin D levels as well given toxicity.  Advised to discontinue vitamin D supplementation.  Will reach out to patient with further recommendations after lab test results  Advised not to restart on thyroid and adrenal health formulas  Follow-up in 4 months      History of Present Illness:   Patient is a 80 year old female with past medical history of Hashimoto's hypothyroidism, osteoporosis, lumbar radiculopathy, presents as a new patient referred by PCP for abnormal TFTs.  Patient states that she was diagnosed with hypothyroidism in early life probably when she was a teenager and has been on levothyroxine for many years at least greater than 20 to 30 years.  She has been on a stable dose of levothyroxine 75 mcg every morning for a long time without changes to her regimen.  Denies recent viral illnesses, however stress is concerned about her back and leg pain that she is following up with orthopedics for.  TSH previously normal in 2022 at 4.1, has been trending high since June 2024, 8.6 last month in March 2025  FT4 has been normal in the past except elevated at 1.15 recently in March this year.    PT c/o feeling cold and cold intolerance, anxiousness, weight loss when she started on a keto diet recently, dry skin.  States the symptoms are mostly chronic for her but she has been feeling more cold and having more dry skin this winter.     No History of external radiation to head/neck/chest.  No family history of thyroid cancer. No recent  radiological diagnostic studies, had a thyroid ultrasound this morning results of which are pending.  Last thyroid ultrasound in 2022 showed no nodules.    Hypothyroid  on Levothyroxine 75 mcg QAM, takes 1 hour before Break Fast on an empty stomach, compliant all of the time denies any side effects from medications.  Patient is taking multiple vitamins and herbal supplements that she is ordering online from Dr. Butler and [employer] online sources.  She has brought in a document with a complete list of them as noted below:  D3 with K2, B1, Mg glycinate, NAD, Vit C, trace minerals, zinc, L-theanine, glucosamine and chondroitin, nutritional yeast, colloidal peptides from Dr. Butler  Products from [employer] include ashwagandha, thyroid support, turmeric Supreme, adrenal health, adrenal restore, sinus and lung support, Mucosil, Lions main.  She has been using kelp of these for the past 2 to 3 years however started on thyroid and adrenal support recently 3 months ago.  Review of labs indicate that she has vitamin D toxicity with levels greater than 120 leading to recently high calcium levels at 10.8.  Also reports that she took some kelp leaves last week to make tea  Started keto diet recently this year    Patient Active Problem List   Diagnosis    Animal bite    Tinnitus of both ears    Great toe pain, right    Recurrent major depressive disorder, in partial remission (HCC)    Pain in both hands    Basal cell carcinoma (BCC) of skin of face    Urinary frequency    Right hip pain    Poor dentition    Allergies    Fatigue    Hypothyroidism    Body aches    Urinary urgency    Alopecia    Lumbar spine pain    Osteoporosis    Vitamin D deficiency    HSV (herpes simplex virus) infection     Past Medical History:   Diagnosis Date    Disease of thyroid gland       Past Surgical History:   Procedure Laterality Date    FL INJECTION RIGHT SHOULDER (ARTHROGRAM)  11/18/2019    JOINT REPLACEMENT      KNEE SURGERY Left 2021    SHOULDER SURGERY Right 2012    orif      Family History   Problem Relation Age of Onset    Polymyalgia rheumatica Mother     Diabetes Father     Diabetes Sister      Social History  "    Tobacco Use    Smoking status: Former     Current packs/day: 0.00     Types: Cigarettes     Quit date:      Years since quittin.2    Smokeless tobacco: Never   Substance Use Topics    Alcohol use: Yes     Alcohol/week: 1.0 standard drink of alcohol     Types: 1 Glasses of wine per week     No Known Allergies  [unfilled]    Review of Systems  A 10 point ROS performed, negative except stated above in HPI    Physical Exam:  Body mass index is 22.6 kg/m².  /70 (BP Location: Left arm, Patient Position: Sitting, Cuff Size: Large)   Pulse 60   Ht 5' 6\" (1.676 m)   Wt 63.5 kg (140 lb)   SpO2 98%   BMI 22.60 kg/m²    [unfilled]     Physical Exam  Constitutional:       General: She is not in acute distress.     Appearance: Normal appearance. She is normal weight. She is not ill-appearing, toxic-appearing or diaphoretic.   HENT:      Head: Normocephalic.   Eyes:      Conjunctiva/sclera: Conjunctivae normal.      Comments: No proptosis   Neck:      Comments: No thyromegaly or palpable nodule  Pulmonary:      Effort: Pulmonary effort is normal.   Musculoskeletal:         General: No swelling. Normal range of motion.      Cervical back: Neck supple. No tenderness.   Skin:     General: Skin is warm and dry.   Neurological:      Mental Status: She is alert and oriented to person, place, and time. Mental status is at baseline.   Psychiatric:         Mood and Affect: Mood normal.      Comments: Anxious           Labs:    Latest Reference Range & Units 22 10:52 24 06:50 24 14:39 25 13:46   Hemoglobin A1C PreDiabetic 5.7-6.4%; Diabetic >=6.5%;   5.9 (H)  5.6   TSH 3RD GENERATON 0.450 - 4.500 uIU/mL 4.135 7.056 (H) 4.509 (H) 8.686 (H)   FREE T4 0.61 - 1.12 ng/dL  0.78 0.90 1.15 (H)     Component  Ref Range & Units (hover) 3/11/25  1:46 PM 24  6:50 AM 22 10:52 AM   Vit D, 25-Hydroxy >120.0 High  55.6 CM 34.8     eGFR   Date Value Ref Range Status   2025 57 ml/min/1.73sq m " Final     Lab Results   Component Value Date    HDL 72 03/11/2025    TRIG 112 03/11/2025     Lab Results   Component Value Date    ALT 28 03/11/2025    AST 26 03/11/2025    ALKPHOS 70 03/11/2025     THYROID ULTRASOUND     INDICATION:    E06.3: Autoimmune thyroiditis.     COMPARISON:  None     TECHNIQUE:   Ultrasound of the thyroid was performed with a high frequency linear transducer in transverse and sagittal planes including volumetric imaging sweeps as well as traditional still imaging technique.     FINDINGS:  Thyroid parenchyma is diffusely heterogeneous in echotexture.     Right lobe: 4.1 x 1.1 x 1.0 cm. Volume 2.0 mL  Left lobe:  3.2 x 0.7 x 0.7 cm. Volume 0.8 mL  Isthmus: 0.1  cm.     No thyroid nodules are demonstrated.     IMPRESSION:     No nodule meets current ACR criteria for requiring biopsy or followup ultrasounds.            Reference: ACR Thyroid Imaging, Reporting and Data System (TI-RADS): White Paper of the ACR TI-RADS Committee. J AM Marilu Radiol 2017;14:587-595. (additional recommendations based on American Thyroid Association 2015 guidelines.)        Workstation performed: [id number]        Imaging    US thyroid (Order: [id number]) - 11/22/2022

## 2025-04-07 ENCOUNTER — HOSPITAL ENCOUNTER (OUTPATIENT)
Dept: ULTRASOUND IMAGING | Facility: CLINIC | Age: 80
Discharge: HOME | End: 2025-04-07
Payer: COMMERCIAL

## 2025-04-07 ENCOUNTER — CONSULT (OUTPATIENT)
Dept: ENDOCRINOLOGY | Facility: CLINIC | Age: 80
End: 2025-04-07
Payer: COMMERCIAL

## 2025-04-07 VITALS
HEART RATE: 60 BPM | OXYGEN SATURATION: 98 % | BODY MASS INDEX: 22.5 KG/M2 | HEIGHT: 66 IN | SYSTOLIC BLOOD PRESSURE: 114 MMHG | DIASTOLIC BLOOD PRESSURE: 70 MMHG | WEIGHT: 140 LBS

## 2025-04-07 DIAGNOSIS — T45.2X1A POISONING BY VITAMIN D, ACCIDENTAL OR UNINTENTIONAL, INITIAL ENCOUNTER: ICD-10-CM

## 2025-04-07 DIAGNOSIS — M81.0 AGE RELATED OSTEOPOROSIS, UNSPECIFIED PATHOLOGICAL FRACTURE PRESENCE: ICD-10-CM

## 2025-04-07 DIAGNOSIS — E06.3 HYPOTHYROIDISM DUE TO HASHIMOTO THYROIDITIS: Primary | ICD-10-CM

## 2025-04-07 DIAGNOSIS — G60.9 NEUROPATHY, IDIOPATHIC: ICD-10-CM

## 2025-04-07 DIAGNOSIS — R79.89 ABNORMAL TSH: ICD-10-CM

## 2025-04-07 PROCEDURE — 76536 US EXAM OF HEAD AND NECK: CPT

## 2025-04-07 PROCEDURE — 99204 OFFICE O/P NEW MOD 45 MIN: CPT | Performed by: INTERNAL MEDICINE

## 2025-04-07 PROCEDURE — G2211 COMPLEX E/M VISIT ADD ON: HCPCS | Performed by: INTERNAL MEDICINE

## 2025-04-07 RX ORDER — CHOLECALCIFEROL (VITAMIN D3) 1250 MCG
CAPSULE ORAL
COMMUNITY
Start: 2024-11-01 | End: 2025-04-07

## 2025-04-09 ENCOUNTER — RESULTS FOLLOW-UP (OUTPATIENT)
Dept: FAMILY MEDICINE CLINIC | Facility: CLINIC | Age: 80
End: 2025-04-09

## 2025-04-21 ENCOUNTER — APPOINTMENT (OUTPATIENT)
Dept: LAB | Facility: HOSPITAL | Age: 80
End: 2025-04-21
Attending: STUDENT IN AN ORGANIZED HEALTH CARE EDUCATION/TRAINING PROGRAM
Payer: COMMERCIAL

## 2025-04-21 DIAGNOSIS — M81.0 AGE RELATED OSTEOPOROSIS, UNSPECIFIED PATHOLOGICAL FRACTURE PRESENCE: ICD-10-CM

## 2025-04-21 DIAGNOSIS — R79.89 ABNORMAL TSH: ICD-10-CM

## 2025-04-21 DIAGNOSIS — E06.3 HYPOTHYROIDISM DUE TO HASHIMOTO THYROIDITIS: ICD-10-CM

## 2025-04-21 DIAGNOSIS — T45.2X1A POISONING BY VITAMIN D, ACCIDENTAL OR UNINTENTIONAL, INITIAL ENCOUNTER: ICD-10-CM

## 2025-04-21 DIAGNOSIS — G60.9 NEUROPATHY, IDIOPATHIC: ICD-10-CM

## 2025-04-21 LAB
25(OH)D3 SERPL-MCNC: >120 NG/ML (ref 30–100)
ALBUMIN SERPL BCG-MCNC: 4.5 G/DL (ref 3.5–5)
ALP SERPL-CCNC: 56 U/L (ref 34–104)
ALT SERPL W P-5'-P-CCNC: 41 U/L (ref 7–52)
ANION GAP SERPL CALCULATED.3IONS-SCNC: 6 MMOL/L (ref 4–13)
AST SERPL W P-5'-P-CCNC: 29 U/L (ref 13–39)
BILIRUB SERPL-MCNC: 0.82 MG/DL (ref 0.2–1)
BUN SERPL-MCNC: 16 MG/DL (ref 5–25)
CALCIUM SERPL-MCNC: 9.8 MG/DL (ref 8.4–10.2)
CHLORIDE SERPL-SCNC: 106 MMOL/L (ref 96–108)
CO2 SERPL-SCNC: 29 MMOL/L (ref 21–32)
CREAT SERPL-MCNC: 0.77 MG/DL (ref 0.6–1.3)
GFR SERPL CREATININE-BSD FRML MDRD: 74 ML/MIN/1.73SQ M
GLUCOSE P FAST SERPL-MCNC: 96 MG/DL (ref 65–99)
IGA SERPL-MCNC: 187 MG/DL (ref 66–433)
POTASSIUM SERPL-SCNC: 4.1 MMOL/L (ref 3.5–5.3)
PROT SERPL-MCNC: 6.8 G/DL (ref 6.4–8.4)
PTH-INTACT SERPL-MCNC: 28.4 PG/ML (ref 12–88)
SODIUM SERPL-SCNC: 141 MMOL/L (ref 135–147)
T4 FREE SERPL-MCNC: 0.97 NG/DL (ref 0.61–1.12)
TSH SERPL DL<=0.05 MIU/L-ACNC: 0.51 UIU/ML (ref 0.45–4.5)
VIT B12 SERPL-MCNC: 552 PG/ML (ref 180–914)

## 2025-04-21 PROCEDURE — 36415 COLL VENOUS BLD VENIPUNCTURE: CPT

## 2025-04-21 PROCEDURE — 86258 DGP ANTIBODY EACH IG CLASS: CPT

## 2025-04-21 PROCEDURE — 84439 ASSAY OF FREE THYROXINE: CPT

## 2025-04-21 PROCEDURE — 82306 VITAMIN D 25 HYDROXY: CPT

## 2025-04-21 PROCEDURE — 86376 MICROSOMAL ANTIBODY EACH: CPT

## 2025-04-21 PROCEDURE — 84443 ASSAY THYROID STIM HORMONE: CPT

## 2025-04-21 PROCEDURE — 82784 ASSAY IGA/IGD/IGG/IGM EACH: CPT

## 2025-04-21 PROCEDURE — 83970 ASSAY OF PARATHORMONE: CPT

## 2025-04-21 PROCEDURE — 86800 THYROGLOBULIN ANTIBODY: CPT

## 2025-04-21 PROCEDURE — 86364 TISS TRNSGLTMNASE EA IG CLAS: CPT

## 2025-04-21 PROCEDURE — 80053 COMPREHEN METABOLIC PANEL: CPT

## 2025-04-21 PROCEDURE — 82607 VITAMIN B-12: CPT

## 2025-04-22 LAB
THYROGLOB AB SERPL-ACNC: <1 IU/ML (ref 0–0.9)
THYROPEROXIDASE AB SERPL-ACNC: 22 IU/ML (ref 0–34)

## 2025-04-24 LAB
GLIADIN IGG SER IA-ACNC: <1.4 U/ML (ref ?–10)
GLIADIN PEPTIDE IGA SER IA-ACNC: 0.6 U/ML (ref ?–10)
TTG IGA SER IA-ACNC: <0.4 U/ML (ref ?–10)
TTG IGG SER IA-ACNC: <1.7 U/ML (ref ?–10)

## 2025-04-28 ENCOUNTER — RESULTS FOLLOW-UP (OUTPATIENT)
Dept: ENDOCRINOLOGY | Facility: CLINIC | Age: 80
End: 2025-04-28

## 2025-04-28 DIAGNOSIS — T45.2X1A POISONING BY VITAMIN D, ACCIDENTAL OR UNINTENTIONAL, INITIAL ENCOUNTER: Primary | ICD-10-CM

## 2025-04-28 DIAGNOSIS — M81.0 AGE-RELATED OSTEOPOROSIS WITHOUT CURRENT PATHOLOGICAL FRACTURE: ICD-10-CM

## 2025-05-13 ENCOUNTER — OFFICE VISIT (OUTPATIENT)
Dept: OBGYN CLINIC | Facility: CLINIC | Age: 80
End: 2025-05-13
Payer: COMMERCIAL

## 2025-05-13 VITALS — HEIGHT: 66 IN | WEIGHT: 141 LBS | BODY MASS INDEX: 22.66 KG/M2

## 2025-05-13 DIAGNOSIS — M47.816 OSTEOARTHRITIS OF LUMBAR SPINE, UNSPECIFIED SPINAL OSTEOARTHRITIS COMPLICATION STATUS: Primary | ICD-10-CM

## 2025-05-13 DIAGNOSIS — M70.61 TROCHANTERIC BURSITIS OF RIGHT HIP: ICD-10-CM

## 2025-05-13 PROCEDURE — 99213 OFFICE O/P EST LOW 20 MIN: CPT | Performed by: FAMILY MEDICINE

## 2025-05-13 NOTE — PROGRESS NOTES
Name: Dixie Shah      : 1945      MRN: [id number]  Encounter Provider: Royce Wilson DO  Encounter Date: 2025   Encounter department: [facility]  :  Assessment & Plan  Osteoarthritis of lumbar spine, unspecified spinal osteoarthritis complication status         Trochanteric bursitis of right hip  Patient has recovered in regards to trochanteric bursitis.  No longer exhibiting any pain symptoms with palpation over the troches bursa.  She does feel some relative instability of the hips when walking however no structural abnormality when I evaluated on today's examination or upon evaluation of prior radiographs.  Did recommend physical therapy for conditioning and strengthening of bilateral hips.  She will follow-up as needed           History of Present Illness   HPI  Dixie Shah is a 80 y.o. female who presents for a follow-up visit for lower back and right hip pain.  Patient was diagnosed with osteoarthritis of the lumbar spine and trochanteric bursitis of the right hip.  Was referred to physical therapy at the last visit.  States that within 2 weeks after last appointment symptoms did improve and no longer needed any medication for treatment.  Has not yet established with physical therapy.    History obtained from: patient    Review of Systems  Pertinent Medical History   Hypothyroidism, basal cell cancer, osteoporosis, depression           Objective   There were no vitals taken for this visit.     Physical Exam      Neuro: no weakness in the L4-S1 nerve distribution  Musculoskeletal:  Inspection: no observable abnormalities  Palpation - tenderness to palpation over greater trochanter  ROM: Full flexion and extension of the hip. full internal and external rotation  Special tests: negative FADIR and EVERETTE test       Negative straight leg raise negative slump test  Full range of motion with lumbar flexion extension and rotation  No paraspinal  tenderness    Administrative Statements   I have spent a total time of 30 minutes in caring for this patient on the day of the visit/encounter including Impressions, Counseling / Coordination of care, Documenting in the medical record, Reviewing/placing orders in the medical record (including tests, medications, and/or procedures), and Obtaining or reviewing history  .

## 2025-06-05 ENCOUNTER — RA CDI HCC (OUTPATIENT)
Dept: OTHER | Facility: HOSPITAL | Age: 80
End: 2025-06-05

## 2025-06-09 ENCOUNTER — OFFICE VISIT (OUTPATIENT)
Dept: ENDOCRINOLOGY | Facility: CLINIC | Age: 80
End: 2025-06-09
Payer: COMMERCIAL

## 2025-06-09 VITALS
HEART RATE: 57 BPM | HEIGHT: 66 IN | BODY MASS INDEX: 22.82 KG/M2 | SYSTOLIC BLOOD PRESSURE: 108 MMHG | WEIGHT: 142 LBS | DIASTOLIC BLOOD PRESSURE: 70 MMHG | OXYGEN SATURATION: 97 %

## 2025-06-09 DIAGNOSIS — T45.2X1A POISONING BY VITAMIN D, ACCIDENTAL OR UNINTENTIONAL, INITIAL ENCOUNTER: ICD-10-CM

## 2025-06-09 DIAGNOSIS — M81.0 AGE-RELATED OSTEOPOROSIS WITHOUT CURRENT PATHOLOGICAL FRACTURE: ICD-10-CM

## 2025-06-09 DIAGNOSIS — E06.3 HYPOTHYROIDISM DUE TO HASHIMOTO THYROIDITIS: Primary | ICD-10-CM

## 2025-06-09 PROCEDURE — 99215 OFFICE O/P EST HI 40 MIN: CPT | Performed by: PHYSICIAN ASSISTANT

## 2025-06-09 NOTE — ASSESSMENT & PLAN NOTE
Advised to stop all vitamin D supplementation but she declines to stop the 1100 units that she is getting through her other supplements  Discussed vitamin D toxicity  She has a repeat level due in July  Orders:    Vitamin D 25 hydroxy; Future

## 2025-06-09 NOTE — ASSESSMENT & PLAN NOTE
DEXA scan from 2023 was consistent with osteoporosis  Recommended treatment for low bone density but patient prefers to consider treatment options after she completes her repeat DEXA scan which is due this year  PCP has already placed an order for DEXA scan.  Advised patient she call central scheduling to schedule it.  Orders:    Vitamin D 25 hydroxy; Future

## 2025-06-09 NOTE — PROGRESS NOTES
Name: Dixie Shah      : 1945      MRN: [id number]  Encounter Provider: Tasha Henson PA-C  Encounter Date: 2025   Encounter department: [facility] DI[facility]Y [place]    CC: hypothyroidism, osteoporosis  Assessment & Plan  Hypothyroidism due to Hashimoto thyroiditis  TSH normal at 0.506 and free T4 normal at 0.97  Continue current treatment with levothyroxine  Monitor labs  Advised patient to take medication on an empty stomach at least 1 hour before breakfast and at least 4 hours apart from supplements without missing doses  Orders:    T4, free; Future    TSH, 3rd generation; Future    Age-related osteoporosis without current pathological fracture  DEXA scan from  was consistent with osteoporosis  Recommended treatment for low bone density but patient prefers to consider treatment options after she completes her repeat DEXA scan which is due this year  PCP has already placed an order for DEXA scan.  Advised patient she call central scheduling to schedule it.  Orders:    Vitamin D 25 hydroxy; Future    Poisoning by vitamin D, accidental or unintentional, initial encounter  Advised to stop all vitamin D supplementation but she declines to stop the 1100 units that she is getting through her other supplements  Discussed vitamin D toxicity  She has a repeat level due in July  Orders:    Vitamin D 25 hydroxy; Future          History of Present Illness     Dixie Shah is a 80 y.o. female here for follow-up of hypothyroidism, elevated vitamin D and osteoporosis.  In the past she has had elevated TSH levels despite normal free T4 levels.  It was suspected that the abnormal labs were due to certain supplements patient had started.  She was advised to stop all supplements and repeat thyroid testing and her levels did normalize.  However she does admit at this time that around the time that her TSH levels were elevated she was missing doses of her medication.  Last TSH normal at  0.506 and free T4 normal at 0.97.  She is taking levothyroxine 75 mcg 1 tablet daily on an empty stomach at least 1 hour before breakfast.  She was not aware to take supplements at least 4 hours apart.  Since then she has restarted her supplements.  Thyroid ultrasound done in April 2025 did not show any nodules but did show a heterogeneous gland.  Her thyroid antibodies were negative.  She reports feeling best when she was following a low-carb diet.  Her most recent hemoglobin A1c was normal at 5.6%.  High vitamin D level: Vitamin D previously elevated over 120.  She has stopped her high-dose vitamin D supplement she was taking but she does have other supplements she restarted that have lower doses of vitamin D in it. In total she is taking 1100 IU of vitamin D currently. Repeat labs ordered.  Osteoporosis: She had a DEXA scan in February 2023 which showed osteopenia of multiple sites.  Her 10-year risk of hip fracture was also elevated at 9.5%.  She does have a repeat DEXA scan ordered by her PCP which has not been done yet. She did have a fall in Feb 2025.       Review of Systems   Constitutional:  Positive for fatigue. Negative for activity change, appetite change and unexpected weight change.   HENT:  Negative for trouble swallowing.    Eyes:  Negative for visual disturbance.   Respiratory:  Negative for shortness of breath.    Cardiovascular:  Negative for chest pain and palpitations.   Gastrointestinal:  Negative for constipation and diarrhea.   Endocrine: Positive for cold intolerance and heat intolerance. Negative for polydipsia.   Musculoskeletal:  Positive for arthralgias.   Skin: Negative.    Neurological:  Negative for tremors.   Psychiatric/Behavioral:  Positive for dysphoric mood.     as per HPI  Medications Ordered Prior to Encounter[1]      Medical History Reviewed by provider this encounter:  Tobacco  Allergies  Meds  Problems  Med Hx  Surg Hx  Fam Hx     .    Objective   /70   Pulse  "57   Ht 5' 6\" (1.676 m)   Wt 64.4 kg (142 lb)   SpO2 97%   BMI 22.92 kg/m²      Body mass index is 22.92 kg/m².  Wt Readings from Last 3 Encounters:   06/09/25 64.4 kg (142 lb)   05/13/25 64 kg (141 lb)   04/07/25 63.5 kg (140 lb)     Physical Exam  Vitals and nursing note reviewed.   Constitutional:       Appearance: She is well-developed.   HENT:      Head: Normocephalic.     Eyes:      General: No scleral icterus.    Neck:      Thyroid: No thyromegaly.     Cardiovascular:      Rate and Rhythm: Normal rate and regular rhythm.      Pulses:           Radial pulses are 2+ on the right side and 2+ on the left side.      Heart sounds: No murmur heard.  Pulmonary:      Effort: Pulmonary effort is normal. No respiratory distress.      Breath sounds: Normal breath sounds. No wheezing.     Musculoskeletal:      Cervical back: Neck supple.     Skin:     General: Skin is warm and dry.     Neurological:      Mental Status: She is alert.         Labs: I have reviewed pertinent labs including:   Lab Results   Component Value Date    XFP5CGSUZRHD 0.506 04/21/2025      Lab Results   Component Value Date    FREET4 0.97 04/21/2025      Lab Results   Component Value Date    YTOJ38IFJGSU >120.0 (H) 04/21/2025    XARV62BLFZGQ >120.0 (H) 03/11/2025    [id number] 55.6 06/17/2024      Radiology Results Review: I have reviewed radiology reports from 2025, 2023 including: DEXA and Ultrasound(s).  There are no Patient Instructions on file for this visit.    Discussed with the patient and all questioned fully answered. She will call me if any problems arise.    Administrative Statements   I have spent a total time of 50 minutes in caring for this patient on the day of the visit/encounter including Importance of tx compliance, Documenting in the medical record, Reviewing/placing orders in the medical record (including tests, medications, and/or procedures), and Obtaining or reviewing history  .         [1]   Current Outpatient " Medications on File Prior to Visit   Medication Sig Dispense Refill    Cod Liver Oil OIL Take by mouth      levothyroxine 75 mcg tablet Take 1 tablet (75 mcg total) by mouth daily 90 tablet 3    mirtazapine (REMERON) 15 mg tablet TAKE 1 TABLET BY MOUTH EVERYDAY AT BEDTIME 90 tablet 1    PreviDent 5000 Booster Plus 1.1 % PSTE       ciprofloxacin-dexamethasone (CIPRODEX) otic suspension Administer 4 drops into both ears 2 (two) times a day (Patient not taking: Reported on 4/7/2025) 7.5 mL 0    clobetasol (TEMOVATE) 0.05 % cream Apply topically 2 (two) times a day (Patient not taking: Reported on 4/7/2025) 60 g 1    naproxen (NAPROSYN) 250 mg tablet Take 220 mg by mouth 2 (two) times a day with meals (Patient not taking: Reported on 4/7/2025)       No current facility-administered medications on file prior to visit.

## 2025-06-09 NOTE — ASSESSMENT & PLAN NOTE
TSH normal at 0.506 and free T4 normal at 0.97  Continue current treatment with levothyroxine  Monitor labs  Advised patient to take medication on an empty stomach at least 1 hour before breakfast and at least 4 hours apart from supplements without missing doses  Orders:    T4, free; Future    TSH, 3rd generation; Future

## 2025-06-13 ENCOUNTER — OFFICE VISIT (OUTPATIENT)
Dept: FAMILY MEDICINE CLINIC | Facility: CLINIC | Age: 80
End: 2025-06-13
Payer: COMMERCIAL

## 2025-06-13 VITALS
HEART RATE: 85 BPM | OXYGEN SATURATION: 99 % | RESPIRATION RATE: 18 BRPM | DIASTOLIC BLOOD PRESSURE: 60 MMHG | SYSTOLIC BLOOD PRESSURE: 102 MMHG | BODY MASS INDEX: 22.98 KG/M2 | HEIGHT: 66 IN | WEIGHT: 143 LBS | TEMPERATURE: 98 F

## 2025-06-13 DIAGNOSIS — M81.0 AGE-RELATED OSTEOPOROSIS WITHOUT CURRENT PATHOLOGICAL FRACTURE: ICD-10-CM

## 2025-06-13 DIAGNOSIS — E06.3 HYPOTHYROIDISM DUE TO HASHIMOTO THYROIDITIS: ICD-10-CM

## 2025-06-13 DIAGNOSIS — Z12.31 ENCOUNTER FOR SCREENING MAMMOGRAM FOR BREAST CANCER: Primary | ICD-10-CM

## 2025-06-13 PROCEDURE — 99214 OFFICE O/P EST MOD 30 MIN: CPT

## 2025-06-13 PROCEDURE — G0439 PPPS, SUBSEQ VISIT: HCPCS

## 2025-06-13 NOTE — ASSESSMENT & PLAN NOTE
Please have dexa and follow with endocrinology  Orders:  •  DXA bone density spine hip and pelvis; Future

## 2025-06-13 NOTE — PROGRESS NOTES
Name: Dixie Shah      : 1945      MRN: [id number]  Encounter Provider: MARTIN Dos Santos  Encounter Date: 2025   Encounter department: [facility] [address] [facility]  :  Assessment & Plan  Encounter for screening mammogram for breast cancer  Please have mammo  Orders:  •  Mammo screening bilateral w 3d and cad; Future    Age-related osteoporosis without current pathological fracture  Please have dexa and follow with endocrinology  Orders:  •  DXA bone density spine hip and pelvis; Future    Hypothyroidism due to Hashimoto thyroiditis  Following with endocrinology, compliant with levothyroxine.          Depression Screening and Follow-up Plan: Patient was screened for depression during today's encounter. They screened negative with a PHQ-9 score of 2.        Preventive health issues were discussed with patient, and age appropriate screening tests were ordered as noted in patient's After Visit Summary. Personalized health advice and appropriate referrals for health education or preventive services given if needed, as noted in patient's After Visit Summary.    History of Present Illness     Dixie is here for follow up.        Patient Care Team:  MARTIN Dos Santos as PCP - General (Nurse Practitioner)  Michelle An MD (Endocrinology)    Review of Systems   Constitutional:  Negative for chills, diaphoresis and fever.   HENT:  Negative for congestion, ear pain, sinus pressure, sinus pain and sore throat.    Respiratory:  Negative for cough, chest tightness and shortness of breath.    Cardiovascular:  Negative for chest pain and palpitations.   Gastrointestinal:  Negative for abdominal pain, constipation, diarrhea, nausea and vomiting.   Genitourinary:  Negative for dysuria, frequency and hematuria.   Musculoskeletal:  Negative for arthralgias, back pain and myalgias.   Skin:  Negative for rash.   Neurological:  Negative for dizziness, seizures, syncope,  light-headedness and headaches.   Psychiatric/Behavioral:  Negative for dysphoric mood and sleep disturbance. The patient is not nervous/anxious.    All other systems reviewed and are negative.    Medical History Reviewed by provider this encounter:       Annual Wellness Visit Questionnaire   Dixie is here for her Subsequent Wellness visit. Last Medicare Wellness visit information reviewed, patient interviewed, no change since last AWV.     Health Risk Assessment:   Patient rates overall health as good. Patient feels that their physical health rating is same. Patient is very satisfied with their life. Eyesight was rated as much better. Hearing was rated as much better. Patient feels that their emotional and mental health rating is same. Patients states they are never, rarely angry. Patient states they are never, rarely unusually tired/fatigued. Pain experienced in the last 7 days has been none. Patient states that she has experienced no weight loss or gain in last 6 months.     Depression Screening:   PHQ-9 Score: 2      Fall Risk Screening:   In the past year, patient has experienced: no history of falling in past year      Urinary Incontinence Screening:   Patient has not leaked urine accidently in the last six months.     Home Safety:  Patient does not have trouble with stairs inside or outside of their home. Patient has working smoke alarms and has working carbon monoxide detector. Home safety hazards include: none.     Nutrition:   Current diet is Regular.     Medications:   Patient is currently taking over-the-counter supplements. OTC medications include: see medication list. Patient is able to manage medications.     Activities of Daily Living (ADLs)/Instrumental Activities of Daily Living (IADLs):   Walk and transfer into and out of bed and chair?: Yes  Dress and groom yourself?: Yes    Bathe or shower yourself?: Yes    Feed yourself? Yes  Do your laundry/housekeeping?: Yes  Manage your money, pay your bills  and track your expenses?: Yes  Make your own meals?: Yes    Do your own shopping?: Yes    Previous Hospitalizations:   Any hospitalizations or ED visits within the last 12 months?: No      Advance Care Planning:   Living will: No    Durable POA for healthcare: No    Advanced directive: No    Advanced directive counseling given: Yes    ACP document given: Yes    End of Life Decisions reviewed with patient: Yes    Provider agrees with end of life decisions: Yes      Cognitive Screening:   Provider or family/friend/caregiver concerned regarding cognition?: No    Preventive Screenings      Cardiovascular Screening:    General: Screening Current      Diabetes Screening:     General: Screening Current      Colorectal Cancer Screening:     General: Screening Current      Breast Cancer Screening:       Due for: Mammogram        Cervical Cancer Screening:    General: Screening Not Indicated      Osteoporosis Screening:    General: Screening Not Indicated and History Osteoporosis      Abdominal Aortic Aneurysm (AAA) Screening:        General: Screening Not Indicated      Lung Cancer Screening:     General: Screening Not Indicated      Hepatitis C Screening:    General: Patient Declines    Immunizations:  - Immunizations due: Zoster (Shingrix)    Screening, Brief Intervention, and Referral to Treatment (SBIRT)     Screening      Single Item Drug Screening:  How often have you used an illegal drug (including marijuana) or a prescription medication for non-medical reasons in the past year? never    Single Item Drug Screen Score: 0  Interpretation: Negative screen for possible drug use disorder    Brief Intervention  Alcohol & drug use screenings were reviewed. No concerns regarding substance use disorder identified.     Other Counseling Topics:   Car/seat belt/driving safety and skin self-exam.     Social Drivers of Health     Financial Resource Strain: Low Risk  (2/14/2023)    Overall Financial Resource Strain (CARDIA)    •  "Difficulty of Paying Living Expenses: Not hard at all   Food Insecurity: No Food Insecurity (6/13/2025)    Nursing - Inadequate Food Risk Classification    • Worried About Running Out of Food in the Last Year: Never true    • Ran Out of Food in the Last Year: Never true   Transportation Needs: No Transportation Needs (6/13/2025)    PRAPARE - Transportation    • Lack of Transportation (Medical): No    • Lack of Transportation (Non-Medical): No   Housing Stability: Unknown (6/13/2025)    Housing Stability Vital Sign    • Unable to Pay for Housing in the Last Year: No    • Homeless in the Last Year: No   Utilities: Not At Risk (6/13/2025)    [facility] Utilities    • Threatened with loss of utilities: No     No results found.    Objective   /60 (BP Location: Left arm, Patient Position: Sitting, Cuff Size: Standard)   Pulse 85   Temp 98 °F (36.7 °C) (Tympanic)   Resp 18   Ht 5' 6\" (1.676 m)   Wt 64.9 kg (143 lb)   SpO2 99%   BMI 23.08 kg/m²     Physical Exam  Vitals and nursing note reviewed.   Constitutional:       General: She is not in acute distress.     Appearance: Normal appearance. She is well-developed. She is not ill-appearing.   HENT:      Head: Normocephalic and atraumatic.      Right Ear: Tympanic membrane, ear canal and external ear normal. There is no impacted cerumen.      Left Ear: Tympanic membrane, ear canal and external ear normal. There is no impacted cerumen.      Nose: Nose normal. No congestion or rhinorrhea.      Mouth/Throat:      Mouth: Mucous membranes are moist.      Pharynx: No posterior oropharyngeal erythema.     Eyes:      Extraocular Movements: Extraocular movements intact.      Conjunctiva/sclera: Conjunctivae normal.      Pupils: Pupils are equal, round, and reactive to light.       Cardiovascular:      Rate and Rhythm: Normal rate and regular rhythm.      Pulses: Normal pulses.      Heart sounds: Normal heart sounds. No murmur heard.  Pulmonary:      Effort: Pulmonary effort is " normal. No respiratory distress.      Breath sounds: Normal breath sounds.   Abdominal:      General: Bowel sounds are normal. There is no distension.      Palpations: Abdomen is soft.      Tenderness: There is no abdominal tenderness.     Musculoskeletal:         General: No swelling or tenderness. Normal range of motion.      Cervical back: Normal range of motion and neck supple. No tenderness.      Right lower leg: No edema.      Left lower leg: No edema.   Lymphadenopathy:      Cervical: No cervical adenopathy.     Skin:     General: Skin is warm and dry.      Capillary Refill: Capillary refill takes less than 2 seconds.     Neurological:      General: No focal deficit present.      Mental Status: She is alert and oriented to person, place, and time.     Psychiatric:         Mood and Affect: Mood normal.         Behavior: Behavior normal.         Thought Content: Thought content normal.         Judgment: Judgment normal.

## 2025-06-13 NOTE — PATIENT INSTRUCTIONS
Medicare Preventive Visit Patient Instructions  Thank you for completing your Welcome to Medicare Visit or Medicare Annual Wellness Visit today. Your next wellness visit will be due in one year (6/14/2026).  The screening/preventive services that you may require over the next 5-10 years are detailed below. Some tests may not apply to you based off risk factors and/or age. Screening tests ordered at today's visit but not completed yet may show as past due. Also, please note that scanned in results may not display below.  Preventive Screenings:  Service Recommendations Previous Testing/Comments   Colorectal Cancer Screening  * Colonoscopy    * Fecal Occult Blood Test (FOBT)/Fecal Immunochemical Test (FIT)  * Fecal DNA/Cologuard Test  * Flexible Sigmoidoscopy Age: 45-75 years old   Colonoscopy: every 10 years (may be performed more frequently if at higher risk)  OR  FOBT/FIT: every 1 year  OR  Cologuard: every 3 years  OR  Sigmoidoscopy: every 5 years  Screening may be recommended earlier than age 45 if at higher risk for colorectal cancer. Also, an individualized decision between you and your healthcare provider will decide whether screening between the ages of 76-85 would be appropriate. Colonoscopy: Not on file  FOBT/FIT: Not on file  Cologuard: Not on file  Sigmoidoscopy: Not on file          Breast Cancer Screening Age: 40+ years old  Frequency: every 1-2 years  Not required if history of left and right mastectomy Mammogram: 03/17/2023        Cervical Cancer Screening Between the ages of 21-29, pap smear recommended once every 3 years.   Between the ages of 30-65, can perform pap smear with HPV co-testing every 5 years.   Recommendations may differ for women with a history of total hysterectomy, cervical cancer, or abnormal pap smears in past. Pap Smear: Not on file    Screening Not Indicated   Hepatitis C Screening Once for adults born between 1945 and 1965  More frequently in patients at high risk for Hepatitis  C Hep C Antibody: Not on file        Diabetes Screening 1-2 times per year if you're at risk for diabetes or have pre-diabetes Fasting glucose: 96 mg/dL (4/21/2025)  A1C: 5.6 % (3/11/2025)  Screening Current   Cholesterol Screening Once every 5 years if you don't have a lipid disorder. May order more often based on risk factors. Lipid panel: 03/11/2025    Screening Current     Other Preventive Screenings Covered by Medicare:  Abdominal Aortic Aneurysm (AAA) Screening: covered once if your at risk. You're considered to be at risk if you have a family history of AAA.  Lung Cancer Screening: covers low dose CT scan once per year if you meet all of the following conditions: (1) Age 55-77; (2) No signs or symptoms of lung cancer; (3) Current smoker or have quit smoking within the last 15 years; (4) You have a tobacco smoking history of at least 20 pack years (packs per day multiplied by number of years you smoked); (5) You get a written order from a healthcare provider.  Glaucoma Screening: covered annually if you're considered high risk: (1) You have diabetes OR (2) Family history of glaucoma OR (3)  aged 50 and older OR (4)  American aged 65 and older  Osteoporosis Screening: covered every 2 years if you meet one of the following conditions: (1) You're estrogen deficient and at risk for osteoporosis based off medical history and other findings; (2) Have a vertebral abnormality; (3) On glucocorticoid therapy for more than 3 months; (4) Have primary hyperparathyroidism; (5) On osteoporosis medications and need to assess response to drug therapy.   Last bone density test (DXA Scan): 02/20/2023.  HIV Screening: covered annually if you're between the age of 15-65. Also covered annually if you are younger than 15 and older than 65 with risk factors for HIV infection. For pregnant patients, it is covered up to 3 times per pregnancy.    Immunizations:  Immunization Recommendations   Influenza Vaccine  Annual influenza vaccination during flu season is recommended for all persons aged >= 6 months who do not have contraindications   Pneumococcal Vaccine   * Pneumococcal conjugate vaccine = PCV13 (Prevnar 13), PCV15 (Vaxneuvance), PCV20 (Prevnar 20)  * Pneumococcal polysaccharide vaccine = PPSV23 (Pneumovax) Adults 19-65 yo with certain risk factors or if 65+ yo  If never received any pneumonia vaccine: recommend Prevnar 20 (PCV20)  Give PCV20 if previously received 1 dose of PCV13 or PPSV23   Hepatitis B Vaccine 3 dose series if at intermediate or high risk (ex: diabetes, end stage renal disease, liver disease)   Respiratory syncytial virus (RSV) Vaccine - COVERED BY MEDICARE PART D  * RSVPreF3 (Arexvy) CDC recommends that adults 60 years of age and older may receive a single dose of RSV vaccine using shared clinical decision-making (SCDM)   Tetanus (Td) Vaccine - COST NOT COVERED BY MEDICARE PART B Following completion of primary series, a booster dose should be given every 10 years to maintain immunity against tetanus. Td may also be given as tetanus wound prophylaxis.   Tdap Vaccine - COST NOT COVERED BY MEDICARE PART B Recommended at least once for all adults. For pregnant patients, recommended with each pregnancy.   Shingles Vaccine (Shingrix) - COST NOT COVERED BY MEDICARE PART B  2 shot series recommended in those 19 years and older who have or will have weakened immune systems or those 50 years and older     Health Maintenance Due:      Topic Date Due   • Hepatitis C Screening  Never done   • Breast Cancer Screening: Mammogram  03/17/2024     Immunizations Due:  There are no preventive care reminders to display for this patient.  Advance Directives   What are advance directives?  Advance directives are legal documents that state your wishes and plans for medical care. These plans are made ahead of time in case you lose your ability to make decisions for yourself. Advance directives can apply to any medical  decision, such as the treatments you want, and if you want to donate organs.   What are the types of advance directives?  There are many types of advance directives, and each state has rules about how to use them. You may choose a combination of any of the following:  Living will:  This is a written record of the treatment you want. You can also choose which treatments you do not want, which to limit, and which to stop at a certain time. This includes surgery, medicine, IV fluid, and tube feedings.   Durable power of  for healthcare (DPAHC):  This is a written record that states who you want to make healthcare choices for you when you are unable to make them for yourself. This person, called a proxy, is usually a family member or a friend. You may choose more than 1 proxy.  Do not resuscitate (DNR) order:  A DNR order is used in case your heart stops beating or you stop breathing. It is a request not to have certain forms of treatment, such as CPR. A DNR order may be included in other types of advance directives.  Medical directive:  This covers the care that you want if you are in a coma, near death, or unable to make decisions for yourself. You can list the treatments you want for each condition. Treatment may include pain medicine, surgery, blood transfusions, dialysis, IV or tube feedings, and a ventilator (breathing machine).  Values history:  This document has questions about your views, beliefs, and how you feel and think about life. This information can help others choose the care that you would choose.  Why are advance directives important?  An advance directive helps you control your care. Although spoken wishes may be used, it is better to have your wishes written down. Spoken wishes can be misunderstood, or not followed. Treatments may be given even if you do not want them. An advance directive may make it easier for your family to make difficult choices about your care.       © Copyright [employer] 2018 Information is for End User's use only and may not be sold, redistributed or otherwise used for commercial purposes. All illustrations and images included in CareNotes® are the copyrighted property of [employer] or [employer]

## 2025-06-20 DIAGNOSIS — F33.41 RECURRENT MAJOR DEPRESSIVE DISORDER, IN PARTIAL REMISSION (HCC): ICD-10-CM

## 2025-06-22 RX ORDER — MIRTAZAPINE 15 MG/1
15 TABLET, FILM COATED ORAL
Qty: 90 TABLET | Refills: 0 | Status: SHIPPED | OUTPATIENT
Start: 2025-06-22

## 2025-07-10 ENCOUNTER — HOSPITAL ENCOUNTER (OUTPATIENT)
Age: 80
Discharge: HOME | End: 2025-07-10
Payer: COMMERCIAL

## 2025-07-10 VITALS — BODY MASS INDEX: 22.98 KG/M2 | HEIGHT: 66 IN | WEIGHT: 143 LBS

## 2025-07-10 DIAGNOSIS — Z12.31 ENCOUNTER FOR SCREENING MAMMOGRAM FOR BREAST CANCER: ICD-10-CM

## 2025-07-10 DIAGNOSIS — M81.0 AGE-RELATED OSTEOPOROSIS WITHOUT CURRENT PATHOLOGICAL FRACTURE: ICD-10-CM

## 2025-07-10 PROCEDURE — 77080 DXA BONE DENSITY AXIAL: CPT

## 2025-07-10 PROCEDURE — 77063 BREAST TOMOSYNTHESIS BI: CPT

## 2025-07-10 PROCEDURE — 77067 SCR MAMMO BI INCL CAD: CPT
